# Patient Record
Sex: FEMALE | Race: WHITE | HISPANIC OR LATINO | Employment: OTHER | URBAN - METROPOLITAN AREA
[De-identification: names, ages, dates, MRNs, and addresses within clinical notes are randomized per-mention and may not be internally consistent; named-entity substitution may affect disease eponyms.]

---

## 2023-02-10 PROBLEM — Z86.39: Status: RESOLVED | Noted: 2023-02-10 | Resolved: 2023-02-10

## 2023-02-10 PROBLEM — M79.642 PAIN IN BOTH HANDS: Status: ACTIVE | Noted: 2023-02-10

## 2023-02-10 PROBLEM — N88.2 CERVICAL STRICTURE OR STENOSIS: Status: ACTIVE | Noted: 2023-02-10

## 2023-02-10 PROBLEM — G47.30 SLEEP DISORDER BREATHING: Status: ACTIVE | Noted: 2023-02-10

## 2023-02-10 PROBLEM — K22.4 DOS (DIFFUSE ESOPHAGEAL SPASM): Status: ACTIVE | Noted: 2023-02-10

## 2023-02-10 PROBLEM — E88.810 METABOLIC SYNDROME: Status: ACTIVE | Noted: 2023-02-10

## 2023-02-10 PROBLEM — I10 HTN (HYPERTENSION): Status: ACTIVE | Noted: 2023-02-10

## 2023-02-10 PROBLEM — M79.641 PAIN IN BOTH HANDS: Status: ACTIVE | Noted: 2023-02-10

## 2023-02-10 PROBLEM — R10.12 LEFT UPPER QUADRANT ABDOMINAL PAIN: Status: ACTIVE | Noted: 2023-02-10

## 2023-02-10 PROBLEM — M79.643 HAND PAIN: Status: ACTIVE | Noted: 2023-02-10

## 2023-02-10 PROBLEM — M25.462 EFFUSION OF LEFT KNEE: Status: ACTIVE | Noted: 2023-02-10

## 2023-02-10 PROBLEM — K21.9 GERD (GASTROESOPHAGEAL REFLUX DISEASE): Status: ACTIVE | Noted: 2023-02-10

## 2023-02-10 PROBLEM — R13.10 DYSPHAGIA: Status: ACTIVE | Noted: 2023-02-10

## 2023-02-10 PROBLEM — T75.3XXA MOTION SICKNESS: Status: ACTIVE | Noted: 2023-02-10

## 2023-02-10 PROBLEM — M54.6 PAIN OF THORACIC FACET JOINT: Status: ACTIVE | Noted: 2023-02-10

## 2023-02-10 PROBLEM — R00.2 PALPITATIONS: Status: ACTIVE | Noted: 2023-02-10

## 2023-02-10 PROBLEM — F32.A DEPRESSION: Status: ACTIVE | Noted: 2023-02-10

## 2023-02-10 PROBLEM — G47.33 OSA (OBSTRUCTIVE SLEEP APNEA): Status: ACTIVE | Noted: 2023-02-10

## 2023-02-10 PROBLEM — I49.9 ARRHYTHMIA: Status: ACTIVE | Noted: 2023-02-10

## 2023-02-10 PROBLEM — E66.9 CLASS 2 OBESITY WITH BODY MASS INDEX (BMI) OF 39.0 TO 39.9 IN ADULT: Status: ACTIVE | Noted: 2023-02-10

## 2023-02-10 PROBLEM — E28.319 PREMATURE MENOPAUSE: Status: ACTIVE | Noted: 2023-02-10

## 2023-02-10 PROBLEM — S66.419A: Status: ACTIVE | Noted: 2023-02-10

## 2023-02-10 PROBLEM — M85.80 OSTEOPENIA: Status: ACTIVE | Noted: 2023-02-10

## 2023-02-10 PROBLEM — R53.83 FATIGUE: Status: ACTIVE | Noted: 2023-02-10

## 2023-02-10 PROBLEM — E66.812 CLASS 2 OBESITY WITH BODY MASS INDEX (BMI) OF 39.0 TO 39.9 IN ADULT: Status: ACTIVE | Noted: 2023-02-10

## 2023-02-10 PROBLEM — J32.9 SINUSITIS: Status: ACTIVE | Noted: 2023-02-10

## 2023-02-10 PROBLEM — E78.5 HYPERLIPEMIA: Status: ACTIVE | Noted: 2023-02-10

## 2023-02-10 PROBLEM — M54.12 CERVICAL RADICULOPATHY, CHRONIC: Status: ACTIVE | Noted: 2023-02-10

## 2023-02-10 PROBLEM — M17.0 PRIMARY OSTEOARTHRITIS OF BOTH KNEES: Status: ACTIVE | Noted: 2023-02-10

## 2023-02-10 PROBLEM — M54.9 BACK PAIN: Status: ACTIVE | Noted: 2023-02-10

## 2023-02-10 PROBLEM — L03.032 PARONYCHIA OF TOE OF LEFT FOOT: Status: ACTIVE | Noted: 2023-02-10

## 2023-02-10 PROBLEM — Z86.39: Status: ACTIVE | Noted: 2023-02-10

## 2023-02-10 PROBLEM — R79.82 CRP ELEVATED: Status: ACTIVE | Noted: 2023-02-10

## 2023-02-10 PROBLEM — E55.9 VITAMIN D DEFICIENCY: Status: ACTIVE | Noted: 2023-02-10

## 2023-02-10 PROBLEM — M25.519 SHOULDER PAIN: Status: ACTIVE | Noted: 2023-02-10

## 2023-02-10 PROBLEM — M19.90 OSTEOARTHRITIS: Status: ACTIVE | Noted: 2023-02-10

## 2023-02-10 PROBLEM — R06.83 SNORING: Status: ACTIVE | Noted: 2023-02-10

## 2023-02-10 PROBLEM — E87.6 HYPOKALEMIA: Status: ACTIVE | Noted: 2023-02-10

## 2023-02-10 PROBLEM — M17.11 ARTHRITIS OF KNEE, RIGHT: Status: ACTIVE | Noted: 2023-02-10

## 2023-02-10 PROBLEM — G89.29 CHRONIC PAIN OF TOE OF LEFT FOOT: Status: ACTIVE | Noted: 2023-02-10

## 2023-02-10 PROBLEM — M79.675 CHRONIC PAIN OF TOE OF LEFT FOOT: Status: ACTIVE | Noted: 2023-02-10

## 2023-02-10 PROBLEM — G56.00 CTS (CARPAL TUNNEL SYNDROME): Status: ACTIVE | Noted: 2023-02-10

## 2023-02-10 PROBLEM — M25.50 JOINT PAIN: Status: ACTIVE | Noted: 2023-02-10

## 2023-02-10 PROBLEM — M17.12 ARTHRITIS OF KNEE, LEFT: Status: ACTIVE | Noted: 2023-02-10

## 2023-02-10 PROBLEM — R51.9 HEADACHE: Status: ACTIVE | Noted: 2023-02-10

## 2023-02-10 PROBLEM — M65.4 TENOSYNOVITIS, DE QUERVAIN: Status: ACTIVE | Noted: 2023-02-10

## 2023-02-10 PROBLEM — M79.18 MYOFASCIAL PAIN SYNDROME: Status: ACTIVE | Noted: 2023-02-10

## 2023-02-10 RX ORDER — CHOLECALCIFEROL (VITAMIN D3) 25 MCG
1 TABLET ORAL DAILY
COMMUNITY

## 2023-02-10 RX ORDER — LIDOCAINE HCL 4 G/100G
1 CREAM TOPICAL 3 TIMES DAILY PRN
COMMUNITY
Start: 2020-07-08 | End: 2024-03-26 | Stop reason: HOSPADM

## 2023-02-10 RX ORDER — METFORMIN HYDROCHLORIDE 500 MG/1
1 TABLET ORAL DAILY
COMMUNITY
End: 2023-03-23 | Stop reason: SDUPTHER

## 2023-02-10 RX ORDER — FLUTICASONE PROPIONATE 50 MCG
2 SPRAY, SUSPENSION (ML) NASAL AS NEEDED
COMMUNITY
End: 2024-01-03 | Stop reason: ALTCHOICE

## 2023-02-10 RX ORDER — PANTOPRAZOLE SODIUM 40 MG/1
1 TABLET, DELAYED RELEASE ORAL DAILY
COMMUNITY
End: 2024-01-03 | Stop reason: SDUPTHER

## 2023-02-10 RX ORDER — ATENOLOL 25 MG/1
1 TABLET ORAL DAILY
COMMUNITY
End: 2023-03-23 | Stop reason: SDUPTHER

## 2023-02-10 RX ORDER — AMLODIPINE BESYLATE 10 MG/1
0.5 TABLET ORAL 2 TIMES DAILY
COMMUNITY
End: 2023-03-23 | Stop reason: SDUPTHER

## 2023-02-10 NOTE — ASSESSMENT & PLAN NOTE
[id number]     Mario Alberto Fountain  Moderate to severe with radiculopathy    Will refer to neurosx

## 2023-03-13 RX ORDER — CYCLOBENZAPRINE HCL 10 MG
1 TABLET ORAL NIGHTLY
COMMUNITY
Start: 2023-03-07 | End: 2024-01-03 | Stop reason: ALTCHOICE

## 2023-03-13 RX ORDER — HYDROCODONE BITARTRATE AND ACETAMINOPHEN 5; 325 MG/1; MG/1
1 TABLET ORAL EVERY 8 HOURS
COMMUNITY
Start: 2023-03-07 | End: 2024-01-03 | Stop reason: ALTCHOICE

## 2023-03-20 ENCOUNTER — TELEPHONE (OUTPATIENT)
Dept: PRIMARY CARE | Facility: CLINIC | Age: 68
End: 2023-03-20
Payer: MEDICARE

## 2023-03-21 NOTE — TELEPHONE ENCOUNTER
Per Dr. TRISTAN, I let Pt know she does not need to do any labs prior to her appointment since she just had labs done at the end of January.

## 2023-03-23 ENCOUNTER — OFFICE VISIT (OUTPATIENT)
Dept: PRIMARY CARE | Facility: CLINIC | Age: 68
End: 2023-03-23
Payer: MEDICARE

## 2023-03-23 ENCOUNTER — LAB (OUTPATIENT)
Dept: LAB | Facility: LAB | Age: 68
End: 2023-03-23
Payer: MEDICARE

## 2023-03-23 VITALS
RESPIRATION RATE: 16 BRPM | TEMPERATURE: 97.7 F | HEIGHT: 62 IN | BODY MASS INDEX: 38.64 KG/M2 | DIASTOLIC BLOOD PRESSURE: 62 MMHG | SYSTOLIC BLOOD PRESSURE: 120 MMHG | WEIGHT: 210 LBS | HEART RATE: 72 BPM

## 2023-03-23 DIAGNOSIS — E88.810 METABOLIC SYNDROME: ICD-10-CM

## 2023-03-23 DIAGNOSIS — Z00.00 MEDICARE ANNUAL WELLNESS VISIT, SUBSEQUENT: ICD-10-CM

## 2023-03-23 DIAGNOSIS — E78.49 OTHER HYPERLIPIDEMIA: ICD-10-CM

## 2023-03-23 DIAGNOSIS — E66.09 CLASS 2 OBESITY DUE TO EXCESS CALORIES WITHOUT SERIOUS COMORBIDITY WITH BODY MASS INDEX (BMI) OF 39.0 TO 39.9 IN ADULT: ICD-10-CM

## 2023-03-23 DIAGNOSIS — I10 HYPERTENSION, UNSPECIFIED TYPE: Primary | ICD-10-CM

## 2023-03-23 DIAGNOSIS — Z12.31 ENCOUNTER FOR SCREENING MAMMOGRAM FOR MALIGNANT NEOPLASM OF BREAST: ICD-10-CM

## 2023-03-23 DIAGNOSIS — E55.9 VITAMIN D DEFICIENCY: ICD-10-CM

## 2023-03-23 DIAGNOSIS — I10 HYPERTENSION, UNSPECIFIED TYPE: ICD-10-CM

## 2023-03-23 PROBLEM — Z96.652 STATUS POST TOTAL LEFT KNEE REPLACEMENT: Status: ACTIVE | Noted: 2023-03-23

## 2023-03-23 PROBLEM — M25.562 LEFT KNEE PAIN: Status: ACTIVE | Noted: 2023-03-23

## 2023-03-23 LAB
ALANINE AMINOTRANSFERASE (SGPT) (U/L) IN SER/PLAS: 12 U/L (ref 7–45)
ALBUMIN (G/DL) IN SER/PLAS: 4.4 G/DL (ref 3.4–5)
ALBUMIN (MG/L) IN URINE: 24.5 MG/L
ALBUMIN/CREATININE (UG/MG) IN URINE: 14.3 UG/MG CRT (ref 0–30)
ALKALINE PHOSPHATASE (U/L) IN SER/PLAS: 87 U/L (ref 33–136)
ANION GAP IN SER/PLAS: 12 MMOL/L (ref 10–20)
ASPARTATE AMINOTRANSFERASE (SGOT) (U/L) IN SER/PLAS: 15 U/L (ref 9–39)
BASOPHILS (10*3/UL) IN BLOOD BY AUTOMATED COUNT: 0.09 X10E9/L (ref 0–0.1)
BASOPHILS/100 LEUKOCYTES IN BLOOD BY AUTOMATED COUNT: 1 % (ref 0–2)
BILIRUBIN TOTAL (MG/DL) IN SER/PLAS: 1.1 MG/DL (ref 0–1.2)
CALCIDIOL (25 OH VITAMIN D3) (NG/ML) IN SER/PLAS: 35 NG/ML
CALCIUM (MG/DL) IN SER/PLAS: 9.6 MG/DL (ref 8.6–10.3)
CARBON DIOXIDE, TOTAL (MMOL/L) IN SER/PLAS: 27 MMOL/L (ref 21–32)
CHLORIDE (MMOL/L) IN SER/PLAS: 106 MMOL/L (ref 98–107)
CHOLESTEROL (MG/DL) IN SER/PLAS: 171 MG/DL (ref 0–199)
CHOLESTEROL IN HDL (MG/DL) IN SER/PLAS: 49.5 MG/DL
CHOLESTEROL/HDL RATIO: 3.5
COBALAMIN (VITAMIN B12) (PG/ML) IN SER/PLAS: 441 PG/ML (ref 211–911)
CREATININE (MG/DL) IN SER/PLAS: 0.63 MG/DL (ref 0.5–1.05)
CREATININE (MG/DL) IN URINE: 171 MG/DL (ref 20–320)
EOSINOPHILS (10*3/UL) IN BLOOD BY AUTOMATED COUNT: 0.07 X10E9/L (ref 0–0.7)
EOSINOPHILS/100 LEUKOCYTES IN BLOOD BY AUTOMATED COUNT: 0.8 % (ref 0–6)
ERYTHROCYTE DISTRIBUTION WIDTH (RATIO) BY AUTOMATED COUNT: 13.4 % (ref 11.5–14.5)
ERYTHROCYTE MEAN CORPUSCULAR HEMOGLOBIN CONCENTRATION (G/DL) BY AUTOMATED: 31.4 G/DL (ref 32–36)
ERYTHROCYTE MEAN CORPUSCULAR VOLUME (FL) BY AUTOMATED COUNT: 92 FL (ref 80–100)
ERYTHROCYTES (10*6/UL) IN BLOOD BY AUTOMATED COUNT: 4.59 X10E12/L (ref 4–5.2)
GFR FEMALE: >90 ML/MIN/1.73M2
GLUCOSE (MG/DL) IN SER/PLAS: 96 MG/DL (ref 74–99)
HEMATOCRIT (%) IN BLOOD BY AUTOMATED COUNT: 42.3 % (ref 36–46)
HEMOGLOBIN (G/DL) IN BLOOD: 13.3 G/DL (ref 12–16)
HEPATITIS C VIRUS AB PRESENCE IN SERUM: NONREACTIVE
IMMATURE GRANULOCYTES/100 LEUKOCYTES IN BLOOD BY AUTOMATED COUNT: 0.2 % (ref 0–0.9)
LDL: 100 MG/DL (ref 0–99)
LEUKOCYTES (10*3/UL) IN BLOOD BY AUTOMATED COUNT: 9.1 X10E9/L (ref 4.4–11.3)
LYMPHOCYTES (10*3/UL) IN BLOOD BY AUTOMATED COUNT: 2.19 X10E9/L (ref 1.2–4.8)
LYMPHOCYTES/100 LEUKOCYTES IN BLOOD BY AUTOMATED COUNT: 24 % (ref 13–44)
MAGNESIUM (MG/DL) IN SER/PLAS: 2.01 MG/DL (ref 1.6–2.4)
MONOCYTES (10*3/UL) IN BLOOD BY AUTOMATED COUNT: 0.64 X10E9/L (ref 0.1–1)
MONOCYTES/100 LEUKOCYTES IN BLOOD BY AUTOMATED COUNT: 7 % (ref 2–10)
NEUTROPHILS (10*3/UL) IN BLOOD BY AUTOMATED COUNT: 6.11 X10E9/L (ref 1.2–7.7)
NEUTROPHILS/100 LEUKOCYTES IN BLOOD BY AUTOMATED COUNT: 67 % (ref 40–80)
PLATELETS (10*3/UL) IN BLOOD AUTOMATED COUNT: 298 X10E9/L (ref 150–450)
POTASSIUM (MMOL/L) IN SER/PLAS: 4.3 MMOL/L (ref 3.5–5.3)
PROTEIN TOTAL: 7 G/DL (ref 6.4–8.2)
SODIUM (MMOL/L) IN SER/PLAS: 141 MMOL/L (ref 136–145)
THYROTROPIN (MIU/L) IN SER/PLAS BY DETECTION LIMIT <= 0.05 MIU/L: 1.28 MIU/L (ref 0.44–3.98)
TRIGLYCERIDE (MG/DL) IN SER/PLAS: 110 MG/DL (ref 0–149)
UREA NITROGEN (MG/DL) IN SER/PLAS: 16 MG/DL (ref 6–23)
VLDL: 22 MG/DL (ref 0–40)

## 2023-03-23 PROCEDURE — 85025 COMPLETE CBC W/AUTO DIFF WBC: CPT

## 2023-03-23 PROCEDURE — 82607 VITAMIN B-12: CPT

## 2023-03-23 PROCEDURE — 80053 COMPREHEN METABOLIC PANEL: CPT

## 2023-03-23 PROCEDURE — 82306 VITAMIN D 25 HYDROXY: CPT

## 2023-03-23 PROCEDURE — 82043 UR ALBUMIN QUANTITATIVE: CPT

## 2023-03-23 PROCEDURE — 99214 OFFICE O/P EST MOD 30 MIN: CPT | Performed by: INTERNAL MEDICINE

## 2023-03-23 PROCEDURE — G0439 PPPS, SUBSEQ VISIT: HCPCS | Performed by: INTERNAL MEDICINE

## 2023-03-23 PROCEDURE — 36415 COLL VENOUS BLD VENIPUNCTURE: CPT

## 2023-03-23 PROCEDURE — 3074F SYST BP LT 130 MM HG: CPT | Performed by: INTERNAL MEDICINE

## 2023-03-23 PROCEDURE — 80061 LIPID PANEL: CPT

## 2023-03-23 PROCEDURE — 3078F DIAST BP <80 MM HG: CPT | Performed by: INTERNAL MEDICINE

## 2023-03-23 PROCEDURE — 1159F MED LIST DOCD IN RCRD: CPT | Performed by: INTERNAL MEDICINE

## 2023-03-23 PROCEDURE — 83735 ASSAY OF MAGNESIUM: CPT

## 2023-03-23 PROCEDURE — 84443 ASSAY THYROID STIM HORMONE: CPT

## 2023-03-23 PROCEDURE — 82570 ASSAY OF URINE CREATININE: CPT

## 2023-03-23 PROCEDURE — 86803 HEPATITIS C AB TEST: CPT

## 2023-03-23 PROCEDURE — 1157F ADVNC CARE PLAN IN RCRD: CPT | Performed by: INTERNAL MEDICINE

## 2023-03-23 PROCEDURE — 3008F BODY MASS INDEX DOCD: CPT | Performed by: INTERNAL MEDICINE

## 2023-03-23 PROCEDURE — 1170F FXNL STATUS ASSESSED: CPT | Performed by: INTERNAL MEDICINE

## 2023-03-23 PROCEDURE — 1160F RVW MEDS BY RX/DR IN RCRD: CPT | Performed by: INTERNAL MEDICINE

## 2023-03-23 RX ORDER — ATENOLOL 25 MG/1
25 TABLET ORAL DAILY
Qty: 90 TABLET | Refills: 0 | Status: SHIPPED | OUTPATIENT
Start: 2023-03-23 | End: 2023-07-07 | Stop reason: SDUPTHER

## 2023-03-23 RX ORDER — METFORMIN HYDROCHLORIDE 500 MG/1
500 TABLET ORAL DAILY
Qty: 90 TABLET | Refills: 0 | Status: SHIPPED | OUTPATIENT
Start: 2023-03-23 | End: 2023-07-07 | Stop reason: SDUPTHER

## 2023-03-23 RX ORDER — AMLODIPINE BESYLATE 10 MG/1
5 TABLET ORAL 2 TIMES DAILY
Qty: 90 TABLET | Refills: 1 | Status: SHIPPED | OUTPATIENT
Start: 2023-03-23 | End: 2023-07-07 | Stop reason: SDUPTHER

## 2023-03-23 RX ORDER — AMLODIPINE BESYLATE 10 MG/1
5 TABLET ORAL 2 TIMES DAILY
Qty: 90 TABLET | Refills: 1 | Status: SHIPPED | OUTPATIENT
Start: 2023-03-23 | End: 2023-03-23 | Stop reason: SDUPTHER

## 2023-03-23 ASSESSMENT — ENCOUNTER SYMPTOMS
NAUSEA: 0
PALPITATIONS: 0
JOINT SWELLING: 0
BACK PAIN: 1
SHORTNESS OF BREATH: 0
WEAKNESS: 0
CONSTIPATION: 0
VOMITING: 0
SLEEP DISTURBANCE: 0
DYSURIA: 0
COUGH: 0
WHEEZING: 0
CONFUSION: 0
DIZZINESS: 0
SORE THROAT: 0
FATIGUE: 0
DIARRHEA: 0
ABDOMINAL PAIN: 0
CHILLS: 0
UNEXPECTED WEIGHT CHANGE: 0
ADENOPATHY: 0
NECK PAIN: 1
ARTHRALGIAS: 1
CHEST TIGHTNESS: 0

## 2023-03-23 ASSESSMENT — PATIENT HEALTH QUESTIONNAIRE - PHQ9
2. FEELING DOWN, DEPRESSED OR HOPELESS: NOT AT ALL
SUM OF ALL RESPONSES TO PHQ9 QUESTIONS 1 AND 2: 0
1. LITTLE INTEREST OR PLEASURE IN DOING THINGS: NOT AT ALL

## 2023-03-23 ASSESSMENT — ACTIVITIES OF DAILY LIVING (ADL)
DOING_HOUSEWORK: INDEPENDENT
GROCERY_SHOPPING: INDEPENDENT
MANAGING_FINANCES: INDEPENDENT
TAKING_MEDICATION: INDEPENDENT
DRESSING: INDEPENDENT
BATHING: INDEPENDENT

## 2023-03-23 NOTE — PROGRESS NOTES
"Subjective   Reason for Visit: Billie Walsh is an 67 y.o. female here for a Medicare Wellness visit.     Past Medical, Surgical, and Family History reviewed and updated in chart.    Reviewed all medications by prescribing practitioner or clinical pharmacist (such as prescriptions, OTCs, herbal therapies and supplements) and documented in the medical record.    AWV  Former smoker , quit 1985, less than 1ppd   Patient had flu and covid vaccines done  Colonoscopy-4.2014-Q10y  Dexa-5.2022  PAP-2014    Had hysterectomy in 2013    Patient Care Team:  Mario Alberto Fountain MD as PCP - General  Mario Alberto Fountain MD as PCP - MSSP ACO Attributed Provider     Review of Systems   Constitutional:  Negative for chills, fatigue and unexpected weight change.        Comment   HENT:  Negative for congestion, ear pain and sore throat.    Respiratory:  Negative for cough, chest tightness, shortness of breath and wheezing.    Cardiovascular:  Negative for palpitations and leg swelling.   Gastrointestinal:  Negative for abdominal pain, constipation, diarrhea, nausea and vomiting.   Genitourinary:  Negative for dysuria and urgency.   Musculoskeletal:  Positive for arthralgias, back pain and neck pain. Negative for joint swelling.   Skin:  Negative for rash.   Neurological:  Negative for dizziness and weakness.   Hematological:  Negative for adenopathy.   Psychiatric/Behavioral:  Negative for confusion and sleep disturbance.    All other systems reviewed and are negative.      Objective   Vitals:  /62 (BP Location: Left arm, Patient Position: Sitting)   Pulse 72   Temp 36.5 °C (97.7 °F)   Resp 16   Ht 1.575 m (5' 2\")   Wt 95.3 kg (210 lb)   BMI 38.41 kg/m²       Physical Exam  Constitutional:       Appearance: Normal appearance.      Comments: obese   HENT:      Head: Normocephalic and atraumatic.   Eyes:      Conjunctiva/sclera: Conjunctivae normal.   Neck:      Vascular: No carotid bruit.   Cardiovascular:      Rate and " Rhythm: Normal rate and regular rhythm.      Heart sounds: No murmur heard.  Pulmonary:      Effort: No respiratory distress.      Breath sounds: No wheezing, rhonchi or rales.   Chest:      Chest wall: No tenderness.   Abdominal:      General: Bowel sounds are normal. There is no distension.      Palpations: Abdomen is soft. There is no mass.      Tenderness: There is no abdominal tenderness.   Musculoskeletal:         General: No swelling.      Cervical back: Neck supple.      Right lower leg: No edema.      Left lower leg: No edema.   Lymphadenopathy:      Cervical: No cervical adenopathy.   Skin:     Coloration: Skin is not jaundiced.      Findings: No rash.   Neurological:      General: No focal deficit present.      Mental Status: She is alert and oriented to person, place, and time. Mental status is at baseline.      Motor: No weakness.      Gait: Gait normal.   Psychiatric:         Mood and Affect: Mood normal.         Behavior: Behavior normal.         Judgment: Judgment normal.         Assessment/Plan   Problem List Items Addressed This Visit       Hyperlipemia    Overview     [id number]   Mario Alberto Fountain  I have discussed the cardiovascular riskk and behavioral modification, nutrition, exercise, and elimination of habits contributing to risk. We agreed to a plan how to reduce risk.    CV risk estimate calculates 7.6%    Wants to wt on tx, will work on diet and wt loss         Relevant Orders    Lipid Panel    HTN (hypertension) - Primary    Overview     [id number]   Mario Alberto Fountain  Controlled now    Continue your current medical regimen with no change         Relevant Medications    atenolol (Tenormin) 25 mg tablet    amLODIPine (Norvasc) 10 mg tablet    Other Relevant Orders    Magnesium    Vitamin B12    Lipid Panel    TSH with reflex to Free T4 if abnormal    Comprehensive Metabolic Panel    CBC and Auto Differential    Metabolic syndrome    Overview     [id number]          Mario Alberto Fountain  No DM, wt  loss, diet         Relevant Medications    metFORMIN (Glucophage) 500 mg tablet    Other Relevant Orders    Albumin , Urine Random    Lipid Panel    Comprehensive Metabolic Panel    CBC and Auto Differential    Vitamin D deficiency    Relevant Orders    Vitamin D, Total    Class 2 obesity with body mass index (BMI) of 39.0 to 39.9 in adult     Other Visit Diagnoses       Encounter for screening mammogram for malignant neoplasm of breast        Relevant Orders    BI mammo bilateral screening tomosynthesis    Medicare annual wellness visit, subsequent        Relevant Orders    Hepatitis C antibody

## 2023-07-05 ENCOUNTER — APPOINTMENT (OUTPATIENT)
Dept: PRIMARY CARE | Facility: CLINIC | Age: 68
End: 2023-07-05
Payer: MEDICARE

## 2023-07-07 ENCOUNTER — TELEPHONE (OUTPATIENT)
Dept: PRIMARY CARE | Facility: CLINIC | Age: 68
End: 2023-07-07
Payer: MEDICARE

## 2023-07-07 DIAGNOSIS — E88.810 METABOLIC SYNDROME: ICD-10-CM

## 2023-07-07 DIAGNOSIS — I10 HYPERTENSION, UNSPECIFIED TYPE: ICD-10-CM

## 2023-07-07 RX ORDER — METFORMIN HYDROCHLORIDE 500 MG/1
500 TABLET ORAL DAILY
Qty: 90 TABLET | Refills: 3 | Status: SHIPPED | OUTPATIENT
Start: 2023-07-07 | End: 2024-03-21 | Stop reason: DRUGHIGH

## 2023-07-07 RX ORDER — ATENOLOL 25 MG/1
25 TABLET ORAL DAILY
Qty: 90 TABLET | Refills: 3 | Status: SHIPPED | OUTPATIENT
Start: 2023-07-07 | End: 2024-03-21 | Stop reason: DRUGHIGH

## 2023-07-07 RX ORDER — AMLODIPINE BESYLATE 10 MG/1
5 TABLET ORAL 2 TIMES DAILY
Qty: 90 TABLET | Refills: 3 | Status: SHIPPED | OUTPATIENT
Start: 2023-07-07 | End: 2024-03-21 | Stop reason: DRUGHIGH

## 2023-07-07 NOTE — TELEPHONE ENCOUNTER
metFORMIN (Glucophage) 500 mg tablet   atenolol (Tenormin) 25 mg tablet   amLODIPine (Norvasc) 10 mg tablet     All to [facility] mail order pharmacy

## 2023-07-17 ENCOUNTER — APPOINTMENT (OUTPATIENT)
Dept: PRIMARY CARE | Facility: CLINIC | Age: 68
End: 2023-07-17
Payer: MEDICARE

## 2023-07-24 ENCOUNTER — OFFICE VISIT (OUTPATIENT)
Dept: PRIMARY CARE | Facility: CLINIC | Age: 68
End: 2023-07-24
Payer: MEDICARE

## 2023-07-24 VITALS
DIASTOLIC BLOOD PRESSURE: 60 MMHG | SYSTOLIC BLOOD PRESSURE: 120 MMHG | HEART RATE: 72 BPM | BODY MASS INDEX: 39.56 KG/M2 | RESPIRATION RATE: 16 BRPM | TEMPERATURE: 97.3 F | HEIGHT: 62 IN | WEIGHT: 215 LBS

## 2023-07-24 DIAGNOSIS — R10.9 ACUTE RIGHT FLANK PAIN: ICD-10-CM

## 2023-07-24 DIAGNOSIS — I10 PRIMARY HYPERTENSION: ICD-10-CM

## 2023-07-24 DIAGNOSIS — E88.810 METABOLIC SYNDROME: Primary | ICD-10-CM

## 2023-07-24 DIAGNOSIS — M54.50 ACUTE BILATERAL LOW BACK PAIN WITHOUT SCIATICA: ICD-10-CM

## 2023-07-24 PROBLEM — M75.82 TENDINITIS OF LEFT ROTATOR CUFF: Status: ACTIVE | Noted: 2023-07-24

## 2023-07-24 LAB — POC HEMOGLOBIN A1C: 5.7 % (ref 4.2–6.5)

## 2023-07-24 PROCEDURE — 3078F DIAST BP <80 MM HG: CPT | Performed by: INTERNAL MEDICINE

## 2023-07-24 PROCEDURE — 1159F MED LIST DOCD IN RCRD: CPT | Performed by: INTERNAL MEDICINE

## 2023-07-24 PROCEDURE — 3074F SYST BP LT 130 MM HG: CPT | Performed by: INTERNAL MEDICINE

## 2023-07-24 PROCEDURE — 3008F BODY MASS INDEX DOCD: CPT | Performed by: INTERNAL MEDICINE

## 2023-07-24 PROCEDURE — 1160F RVW MEDS BY RX/DR IN RCRD: CPT | Performed by: INTERNAL MEDICINE

## 2023-07-24 PROCEDURE — 99214 OFFICE O/P EST MOD 30 MIN: CPT | Performed by: INTERNAL MEDICINE

## 2023-07-24 PROCEDURE — 83036 HEMOGLOBIN GLYCOSYLATED A1C: CPT | Performed by: INTERNAL MEDICINE

## 2023-07-24 PROCEDURE — 1157F ADVNC CARE PLAN IN RCRD: CPT | Performed by: INTERNAL MEDICINE

## 2023-07-24 PROCEDURE — 1125F AMNT PAIN NOTED PAIN PRSNT: CPT | Performed by: INTERNAL MEDICINE

## 2023-07-24 RX ORDER — LIDOCAINE 40 MG/G
CREAM TOPICAL
COMMUNITY
Start: 2020-07-08 | End: 2024-01-03 | Stop reason: SDUPTHER

## 2023-07-24 ASSESSMENT — ENCOUNTER SYMPTOMS
DIZZINESS: 0
HEADACHES: 0
RESPIRATORY NEGATIVE: 1
FATIGUE: 0
CARDIOVASCULAR NEGATIVE: 1

## 2023-07-24 NOTE — PROGRESS NOTES
"Subjective   Billie Walsh is a 67 y.o. female who presents for Follow-up (3 months follow up metabolic syndrome ,HTN/Labs- 3.2023).  Follow up metabolic SD,last HGBA1C was 5.5 on 1.30.2023,today is 5.7  Follow up HTN- checking BP at home sometimes  Labs - 3.2023  Back pain and L side area middle section  x 1 month getting worse, went to urgent care 2 weeks ago gave her steroids x 1 week and muscle relaxer, after finishing TX, pain came back , no radiation on the leg but on left flank, has an curry with ortho, dr Mcgee      Review of Systems   Constitutional:  Negative for fatigue.   Respiratory: Negative.     Cardiovascular: Negative.    Neurological:  Negative for dizziness and headaches.       Objective   Physical Exam  Constitutional:       Comments: Right para lumbar muscle spasm and pain, no rash       /60 (BP Location: Right arm, Patient Position: Sitting)   Pulse 72   Temp 36.3 °C (97.3 °F)   Resp 16   Ht 1.575 m (5' 2\")   Wt 97.5 kg (215 lb)   BMI 39.32 kg/m²       Assessment/Plan   Problem List Items Addressed This Visit       Back pain    HTN (hypertension)     Controlled, no side effects         Metabolic syndrome - Primary     Hba1c 5.7         Relevant Orders    POCT glycosylated hemoglobin (Hb A1C) manually resulted    Acute right flank pain    Relevant Orders    US renal complete       "

## 2023-09-27 ENCOUNTER — APPOINTMENT (OUTPATIENT)
Dept: PRIMARY CARE | Facility: CLINIC | Age: 68
End: 2023-09-27
Payer: MEDICARE

## 2023-12-21 ENCOUNTER — DOCUMENTATION (OUTPATIENT)
Dept: PHYSICAL THERAPY | Facility: CLINIC | Age: 68
End: 2023-12-21
Payer: MEDICARE

## 2023-12-21 NOTE — PROGRESS NOTES
Physical Therapy    Discharge Summary    Name: Billie Walsh  MRN: [id number]  : 1955  Date: 23    Discharge Summary: PT    Discharge Information: Date of discharge 23, Date of last visit 23 , Date of evaluation 8/10/23, Number of attended visits 4, and Referred for back pain    Therapy Summary: seeing some changes with back pain but only came to a few visits    Discharge Status: unknown     Rehab Discharge Reason: Failed to schedule and/or keep follow-up appointment(s)

## 2024-01-03 ENCOUNTER — OFFICE VISIT (OUTPATIENT)
Dept: PRIMARY CARE | Facility: CLINIC | Age: 69
End: 2024-01-03
Payer: MEDICARE

## 2024-01-03 VITALS
OXYGEN SATURATION: 96 % | SYSTOLIC BLOOD PRESSURE: 120 MMHG | TEMPERATURE: 97.7 F | HEART RATE: 82 BPM | BODY MASS INDEX: 39.9 KG/M2 | WEIGHT: 216.8 LBS | HEIGHT: 62 IN | RESPIRATION RATE: 16 BRPM | DIASTOLIC BLOOD PRESSURE: 68 MMHG

## 2024-01-03 DIAGNOSIS — M54.40 CHRONIC MIDLINE LOW BACK PAIN WITH SCIATICA, SCIATICA LATERALITY UNSPECIFIED: ICD-10-CM

## 2024-01-03 DIAGNOSIS — E88.810 METABOLIC SYNDROME: Primary | ICD-10-CM

## 2024-01-03 DIAGNOSIS — E78.49 OTHER HYPERLIPIDEMIA: ICD-10-CM

## 2024-01-03 DIAGNOSIS — R63.5 ABNORMAL WEIGHT GAIN: ICD-10-CM

## 2024-01-03 DIAGNOSIS — E66.09 CLASS 2 OBESITY DUE TO EXCESS CALORIES WITHOUT SERIOUS COMORBIDITY WITH BODY MASS INDEX (BMI) OF 39.0 TO 39.9 IN ADULT: ICD-10-CM

## 2024-01-03 DIAGNOSIS — N39.3 STRESS INCONTINENCE OF URINE: ICD-10-CM

## 2024-01-03 DIAGNOSIS — E55.9 VITAMIN D DEFICIENCY: ICD-10-CM

## 2024-01-03 DIAGNOSIS — Z00.00 HEALTH CARE MAINTENANCE: ICD-10-CM

## 2024-01-03 DIAGNOSIS — E66.01 MORBID (SEVERE) OBESITY DUE TO EXCESS CALORIES (MULTI): ICD-10-CM

## 2024-01-03 DIAGNOSIS — G89.29 CHRONIC MIDLINE LOW BACK PAIN WITH SCIATICA, SCIATICA LATERALITY UNSPECIFIED: ICD-10-CM

## 2024-01-03 DIAGNOSIS — I10 PRIMARY HYPERTENSION: ICD-10-CM

## 2024-01-03 DIAGNOSIS — E74.819 DISORDERS OF GLUCOSE TRANSPORT, UNSPECIFIED (MULTI): ICD-10-CM

## 2024-01-03 PROBLEM — E11.65 TYPE 2 DIABETES MELLITUS WITH HYPERGLYCEMIA, WITHOUT LONG-TERM CURRENT USE OF INSULIN (MULTI): Status: ACTIVE | Noted: 2024-01-03

## 2024-01-03 PROBLEM — R29.3 POOR POSTURE: Status: ACTIVE | Noted: 2024-01-03

## 2024-01-03 PROBLEM — E11.65 TYPE 2 DIABETES MELLITUS WITH HYPERGLYCEMIA, WITHOUT LONG-TERM CURRENT USE OF INSULIN (MULTI): Status: RESOLVED | Noted: 2024-01-03 | Resolved: 2024-01-03

## 2024-01-03 PROCEDURE — 99214 OFFICE O/P EST MOD 30 MIN: CPT | Performed by: INTERNAL MEDICINE

## 2024-01-03 PROCEDURE — 1125F AMNT PAIN NOTED PAIN PRSNT: CPT | Performed by: INTERNAL MEDICINE

## 2024-01-03 PROCEDURE — 3074F SYST BP LT 130 MM HG: CPT | Performed by: INTERNAL MEDICINE

## 2024-01-03 PROCEDURE — 1159F MED LIST DOCD IN RCRD: CPT | Performed by: INTERNAL MEDICINE

## 2024-01-03 PROCEDURE — 3008F BODY MASS INDEX DOCD: CPT | Performed by: INTERNAL MEDICINE

## 2024-01-03 PROCEDURE — 3078F DIAST BP <80 MM HG: CPT | Performed by: INTERNAL MEDICINE

## 2024-01-03 PROCEDURE — 1160F RVW MEDS BY RX/DR IN RCRD: CPT | Performed by: INTERNAL MEDICINE

## 2024-01-03 RX ORDER — PANTOPRAZOLE SODIUM 40 MG/1
40 TABLET, DELAYED RELEASE ORAL DAILY
Qty: 90 EACH | Refills: 2 | Status: SHIPPED | OUTPATIENT
Start: 2024-01-03

## 2024-01-03 RX ORDER — SEMAGLUTIDE 1.34 MG/ML
0.25 INJECTION, SOLUTION SUBCUTANEOUS
Qty: 1 EACH | Refills: 3 | Status: SHIPPED | OUTPATIENT
Start: 2024-01-03 | End: 2024-03-15 | Stop reason: ALTCHOICE

## 2024-01-03 ASSESSMENT — ENCOUNTER SYMPTOMS
CHILLS: 0
CHEST TIGHTNESS: 0
VOMITING: 0
WHEEZING: 0
CONFUSION: 0
NAUSEA: 0
SHORTNESS OF BREATH: 0
COUGH: 0
DYSURIA: 0
SORE THROAT: 0
ABDOMINAL PAIN: 0
DIARRHEA: 0
SLEEP DISTURBANCE: 0
ARTHRALGIAS: 0
JOINT SWELLING: 0
UNEXPECTED WEIGHT CHANGE: 0
ADENOPATHY: 0
CONSTIPATION: 0
PALPITATIONS: 0
FATIGUE: 0
WEAKNESS: 0
DIZZINESS: 0

## 2024-01-03 ASSESSMENT — PATIENT HEALTH QUESTIONNAIRE - PHQ9
2. FEELING DOWN, DEPRESSED OR HOPELESS: NOT AT ALL
1. LITTLE INTEREST OR PLEASURE IN DOING THINGS: NOT AT ALL
SUM OF ALL RESPONSES TO PHQ9 QUESTIONS 1 AND 2: 0

## 2024-01-03 NOTE — PATIENT INSTRUCTIONS
Was nice seeing you today.  Continue same medication.  Have lab work done before next appointment if labs were ordered today.  Fu in 3 month. awv  Call/ contact our office with any concerns.

## 2024-01-03 NOTE — PROGRESS NOTES
Subjective   Billie Walsh is a 68 y.o. female who presents for Follow-up (Follow up HTN).  Follow up HTN-checking BP at home sometimes  Follow up metabolic syndrome   US renal-7.2023  Mammogram- 7.28.2023  \has urinary incontinence , getting worse.         Review of Systems   Constitutional:  Negative for chills, fatigue and unexpected weight change.        Comment   HENT:  Negative for congestion, ear pain and sore throat.    Respiratory:  Negative for cough, chest tightness, shortness of breath and wheezing.    Cardiovascular:  Negative for palpitations and leg swelling.   Gastrointestinal:  Negative for abdominal pain, constipation, diarrhea, nausea and vomiting.   Genitourinary:  Negative for dysuria and urgency.   Musculoskeletal:  Negative for arthralgias and joint swelling.   Skin:  Negative for rash.   Neurological:  Negative for dizziness and weakness.   Hematological:  Negative for adenopathy.   Psychiatric/Behavioral:  Negative for confusion and sleep disturbance.    === 07/24/23 ===    US RENAL COMPLETE    - Impression -  Unremarkable bilateral renal ultrasound.  === 08/08/23 ===    MR LUMBAR SPINE WO CONTRAST    - Impression -  Mild discogenic degenerative changes in the lower lumbar spine with  facet arthrosis. There is degenerative anterior subluxation of L4 and  L5.    Superimposed multilevel mild bulging disc without significant central  canal or neural foraminal stenosis.    No acute osseous abnormality.  Seen by ortho, has medical tx for now for LBP    Objective   Physical Exam  Constitutional:       Appearance: Normal appearance.   HENT:      Head: Normocephalic and atraumatic.   Eyes:      Pupils: Pupils are equal, round, and reactive to light.   Cardiovascular:      Rate and Rhythm: Normal rate and regular rhythm.   Pulmonary:      Effort: Pulmonary effort is normal.      Breath sounds: Normal breath sounds.   Musculoskeletal:         General: Normal range of motion.      Cervical back:  "Normal range of motion and neck supple.   Skin:     General: Skin is warm.   Neurological:      General: No focal deficit present.      Mental Status: She is alert and oriented to person, place, and time.   Psychiatric:         Mood and Affect: Mood normal.         Behavior: Behavior normal.       /68 (BP Location: Left arm, Patient Position: Sitting)   Pulse 82   Temp 36.5 °C (97.7 °F)   Resp 16   Ht 1.575 m (5' 2\")   Wt 98.3 kg (216 lb 12.8 oz)   SpO2 96% Comment: RA  BMI 39.65 kg/m²       Assessment/Plan   Problem List Items Addressed This Visit       Back pain     Had mri done, seen by bri         Hyperlipemia    HTN (hypertension)    Metabolic syndrome - Primary    Relevant Medications    semaglutide (Ozempic) 0.25 mg or 0.5 mg(2 mg/1.5 mL) pen injector    Other Relevant Orders    Lipid Panel    Hemoglobin A1C    Vitamin D deficiency    Morbid (severe) obesity due to excess calories (CMS/[facility])     Low calorie diet , will try ozempic if covered by insurance         Relevant Medications    semaglutide (Ozempic) 0.25 mg or 0.5 mg(2 mg/1.5 mL) pen injector    Stress incontinence of urine    Relevant Orders    Referral to Urogynecology     Other Visit Diagnoses       Health care maintenance        Relevant Medications    pantoprazole (ProtoNix) 40 mg EC tablet    Other Relevant Orders    Vitamin D 25-Hydroxy,Total (for eval of Vitamin D levels)    Abnormal weight gain        Relevant Orders    TSH with reflex to Free T4 if abnormal    Disorders of glucose transport, unspecified (CMS/[facility])        Relevant Orders    Hemoglobin A1C            "

## 2024-01-05 DIAGNOSIS — E88.810 METABOLIC SYNDROME: ICD-10-CM

## 2024-01-05 DIAGNOSIS — E66.01 MORBID (SEVERE) OBESITY DUE TO EXCESS CALORIES (MULTI): ICD-10-CM

## 2024-01-05 DIAGNOSIS — E66.09 CLASS 2 OBESITY DUE TO EXCESS CALORIES WITHOUT SERIOUS COMORBIDITY WITH BODY MASS INDEX (BMI) OF 39.0 TO 39.9 IN ADULT: ICD-10-CM

## 2024-02-05 ENCOUNTER — OFFICE VISIT (OUTPATIENT)
Dept: SLEEP MEDICINE | Facility: CLINIC | Age: 69
End: 2024-02-05
Payer: MEDICARE

## 2024-02-05 VITALS
HEIGHT: 62 IN | HEART RATE: 92 BPM | RESPIRATION RATE: 18 BRPM | BODY MASS INDEX: 40.58 KG/M2 | WEIGHT: 220.5 LBS | SYSTOLIC BLOOD PRESSURE: 121 MMHG | OXYGEN SATURATION: 95 % | DIASTOLIC BLOOD PRESSURE: 74 MMHG

## 2024-02-05 DIAGNOSIS — G47.33 OSA (OBSTRUCTIVE SLEEP APNEA): Primary | ICD-10-CM

## 2024-02-05 PROCEDURE — 1157F ADVNC CARE PLAN IN RCRD: CPT | Performed by: GENERAL PRACTICE

## 2024-02-05 PROCEDURE — 1159F MED LIST DOCD IN RCRD: CPT | Performed by: GENERAL PRACTICE

## 2024-02-05 PROCEDURE — 1160F RVW MEDS BY RX/DR IN RCRD: CPT | Performed by: GENERAL PRACTICE

## 2024-02-05 PROCEDURE — 3008F BODY MASS INDEX DOCD: CPT | Performed by: GENERAL PRACTICE

## 2024-02-05 PROCEDURE — 3078F DIAST BP <80 MM HG: CPT | Performed by: GENERAL PRACTICE

## 2024-02-05 PROCEDURE — 1125F AMNT PAIN NOTED PAIN PRSNT: CPT | Performed by: GENERAL PRACTICE

## 2024-02-05 PROCEDURE — 99213 OFFICE O/P EST LOW 20 MIN: CPT | Performed by: GENERAL PRACTICE

## 2024-02-05 PROCEDURE — 3074F SYST BP LT 130 MM HG: CPT | Performed by: GENERAL PRACTICE

## 2024-02-05 ASSESSMENT — COLUMBIA-SUICIDE SEVERITY RATING SCALE - C-SSRS
2. HAVE YOU ACTUALLY HAD ANY THOUGHTS OF KILLING YOURSELF?: NO
1. IN THE PAST MONTH, HAVE YOU WISHED YOU WERE DEAD OR WISHED YOU COULD GO TO SLEEP AND NOT WAKE UP?: NO
6. HAVE YOU EVER DONE ANYTHING, STARTED TO DO ANYTHING, OR PREPARED TO DO ANYTHING TO END YOUR LIFE?: NO

## 2024-02-05 ASSESSMENT — PATIENT HEALTH QUESTIONNAIRE - PHQ9
1. LITTLE INTEREST OR PLEASURE IN DOING THINGS: NOT AT ALL
2. FEELING DOWN, DEPRESSED OR HOPELESS: NOT AT ALL
SUM OF ALL RESPONSES TO PHQ9 QUESTIONS 1 AND 2: 0

## 2024-02-05 ASSESSMENT — ENCOUNTER SYMPTOMS
DEPRESSION: 0
LOSS OF SENSATION IN FEET: 0
OCCASIONAL FEELINGS OF UNSTEADINESS: 1

## 2024-02-05 NOTE — PROGRESS NOTES
Patient: Billie Walsh    [id number]  : 1955 -- AGE 68 y.o.    Provider: Roberto Jarquin DO     [facility]   Service Date: 2024            Follow up visit note     HISTORY OF PRESENT ILLNESS   HISTORY OF PRESENT ILLNESS   Billie Walsh is a 68 y.o. female who presents to a [facility] for a sleep medicine evaluation with concerns of Follow-up (Yearly follow up no issues with cpap machine or supplies ).     The patient  has no past medical history on file..    PAST SLEEP HISTORY    pmhx including arrhythmia, arthritis, back pain, DM?,metabolic syndrome, GERD, HTN.     patient has been snoring for yrs but has not done anything for it in the past. Around  patient started experiencing fatigue, un-refreshing sleep and sleepiness; she feels that symptoms have worsened recently.     CURRENT HISTORY    On today's visit, 2024, the patient reports that she tries to use pap therapy regularly. She is comfortable with mask and settings.     PAP Related Problems: no leak perceived, no skin irritation from mask and no snoring with PAP.   Perceived Benefits of PAP Therapy: refreshing sleep, decreased nocturia, decreased dry mouth or sore throat, decreased daytime sleepiness, decreased nocturnal awakenings and decreased snoring/ choking/ gasping.     Sleep schedule  on weekdays / work days:  Usual Bedtime: 11:45pm  Sleep latency: 15min   Wake time : 7:30-8am   Total sleep time average/day: 7-8 hours/day  Awakenings: seldom    Naps: none     Sleep schedule  on weekends/non work days :  Same as above   Sleep aid: none     Occupation: retired, used to be a .     Preferred sleeping position: SLEEP POSITION: sidelying    Sleep-related ROS:    Snoring:  n  Witnessed apneas:  n        Am Dry mouth: sometimes.             Nasal congestion: n        am headaches: n    Sleep is described as refreshing .     Daytime sleepiness: sometimes    Fatigue or decreased energy: sometimes   Difficulty remembering things in daytime: n  Difficulty staying focused in daytime: n  Irritable during the day: n  Drowsy driving: n  Hx of car accident: n  Near-miss Car accident: n      RLS screen:  RLSSCREEN: - Sensations: Patient does not have unusual sensations in their extremities that cause an urge to move them   DENIES  Sleep-related behaviors: DENIES    Daytime Symptoms    ESS: 4        REVIEW OF SYSTEMS     REVIEW OF SYSTEMS  Review of Systems   All other systems reviewed and are negative.        ALLERGIES AND MEDICATIONS     ALLERGIES  Allergies   Allergen Reactions    Penicillins Unknown       MEDICATIONS  Current Outpatient Medications   Medication Sig Dispense Refill    amLODIPine (Norvasc) 10 mg tablet Take 0.5 tablets (5 mg) by mouth 2 times a day. 90 tablet 3    atenolol (Tenormin) 25 mg tablet Take 1 tablet (25 mg) by mouth once daily. 90 tablet 3    cholecalciferol (Vitamin D-3) 25 MCG (1000 UT) tablet Take 1 tablet (25 mcg) by mouth once daily.      lidocaine (lidocaine HCL) 4 % cream Apply topically if needed in the morning, at noon, and at bedtime. Apply 1 inch      metFORMIN (Glucophage) 500 mg tablet Take 1 tablet (500 mg) by mouth once daily. 90 tablet 3    pantoprazole (ProtoNix) 40 mg EC tablet Take 1 tablet (40 mg) by mouth once daily. 90 each 2    semaglutide (Ozempic) 0.25 mg or 0.5 mg(2 mg/1.5 mL) pen injector Inject 0.25 mg under the skin 1 (one) time per week. 1 each 3     No current facility-administered medications for this visit.         PAST HISTORY     PAST MEDICAL HISTORY  She  has no past medical history on file.      PAST SURGICAL HISTORY:  Past Surgical History:   Procedure Laterality Date    EYE SURGERY Right     2023    OTHER SURGICAL HISTORY  09/16/2019    Hysterectomy    OTHER SURGICAL HISTORY  09/16/2019    Cardiac catheterization    OTHER SURGICAL HISTORY  09/16/2019    Breast biopsy    OTHER SURGICAL HISTORY  09/16/2019     "Cholecystectomy       FAMILY HISTORY  Family History   Problem Relation Name Age of Onset    Hypertension Mother      Cancer Mother      Diabetes Mother      Heart attack Mother      Coronary artery disease Brother      Other (Colitis) Brother      Heart attack Other Family history          SOCIAL HISTORY  She  reports that she quit smoking about 39 years ago. Her smoking use included cigarettes. She smoked an average of .25 packs per day. She uses smokeless tobacco. She reports current alcohol use. She reports that she does not use drugs.    Caffeine consumption: 2 cups coffee in am   Alcohol consumption: n  Smoking: n  Marijuana: n  Other drugs: n    PHYSICAL EXAM     VITAL SIGNS: /74   Pulse 92   Resp 18   Ht 1.575 m (5' 2\")   Wt 100 kg (220 lb 8 oz)   SpO2 95%   BMI 40.33 kg/m²        PREVIOUS WEIGHTS:  Wt Readings from Last 3 Encounters:   02/05/24 100 kg (220 lb 8 oz)   01/03/24 98.3 kg (216 lb 12.8 oz)   07/24/23 97.5 kg (215 lb)       Physical Exam  Constitutional: Alert and oriented, cooperative, no obvious distress.   HENT: normocephalic.   Neurologic: AOx3.   psychiatric: appropriate mood and affect.      RESULTS/DATA         ASSESSMENT/PLAN     Ms. Walsh is a 68 y.o. female and  has no past medical history on file. She is following up on her sleep apnea     Problem List Items Addressed This Visit    None      Problem List and Orders  retired, used to be a .      pmhx including arrhythmia, arthritis, back pain, DM?,metabolic syndrome, GERD, HTN.     1- TIFFANY  snoring, un-refreshing sleep, witnessed apnea, gasping and chocking, daytime sleepiness.      -PSG 8/10/21-->moderate TIFFANY, AHI 24, worse in supine and REM.   Reviewed and discussed the above sleep study results and download data and management options in details. All questions answered, patient verbalizes understanding.      -adjust. From Autopap 5-15cwp, rAHI 1, median 9.3, max avg 12.2 to Autopap 8-15cwp. Only a few days " with large leak.   -ordered supplies      -do not drive or operate heavy machinery if drowsy.  -avoid sleeping on your back.   -avoid sedating substances/ medication, alcohol, illicit drugs and tobacco.     2- Obesity  counseled on eating a healthy diet and exercising as tolerated.     3- calf cramping resolved   some calf cramping 2x per yr.      4-insomnia resolved   both sleep onset and sleep maintenance both greatly improved with pap use.   likely multifactorial   counseled on good sleep hygiene practices.   not on medication, has tried melatonin in the past but it did not work well for her.        Follow up in 1yr or sooner as needed

## 2024-02-05 NOTE — PATIENT INSTRUCTIONS
[facility] Sleep Medicine   [facility]  [address] [address]  [phone]       NAME: Billie Walsh   DATE: 2/5/2024     Your Sleep Provider Today: Roberto Jarquin DO  Your Primary Care Physician: Mario Alberto Fountain MD   Your Referring Provider: No ref. provider found    DIAGNOSIS:   1. TIFFANY (obstructive sleep apnea)  Positive Airway Pressure (PAP) Therapy          Thank you for coming to the Sleep Medicine Clinic today! Your sleep medicine provider today was: Roberto Jarquin DO Below is a summary of your treatment plan, other important information, and our contact numbers:      TREATMENT PLAN     Follow up in 1yr or sooner as needed     Instructions - Common TIFFANY Recs: - For your sleep apnea, continue to use your PAP every night and use it whenever you are sleeping.   - Avoid alcohol or sedatives several hours prior to sleeping.   - Get additional supplies for your PAP (e.g., mask, hose, filters) every 3 months or as your insurance allows from your [employer] company. Replacement cushions for your PAP mask can be requested monthly if airseals are an issue.  - Remember to clean your mask, tubings, and water chamber regularly as instructed.  - Avoid driving or operating heavy machinery when drowsy. A person driving while sleepy is five (5) times more likely to have an accident. If you feel sleepy, pull over and take a short power nap (sleep for less than 30 minutes). Otherwise, ask somebody to drive you.        IMPORTANT INFORMATION     Call 911 for medical emergencies.  Our offices are generally open from Monday-Friday, 9 am - 5 pm.  If you need to get in touch with me, you may either call me and my team(number is below) or you can use [employer].  If a referral for a test, for CPAP, or for another specialist was made, and you have not heard about scheduling this within a week, please call scheduling at [phone]).  If you are unable to make your  appointment for clinic or an overnight study, kindly call the office at least 48 hours in advance to cancel and reschedule.  If you are on CPAP, please bring your device's card or the device to each clinic appointment.   There are no supporting services by either the sleep doctors or their staff on weekends and Holidays, or after 5 PM on weekdays.   If you have been asked to come to a sleep study, make sure you bring toiletries, a comfy pillow, and any nighttime medications that you may regularly take. Also be sure to eat dinner before you arrive. We generally do not provide meals.      PRESCRIPTIONS     We require 7 days advanced notice for prescription refills. If we do not receive the request in this time, we cannot guarantee that your medication will be refilled in time.      IMPORTANT PHONE NUMBERS     Sleep Medicine Clinic Fax: [phone]  Appointments (for Pediatric Sleep Clinic): [phone] - option 1  Appointments (for Adult Sleep Clinic): [phone] - option 2  Appointments (For Sleep Studies): [phone] - option 3  Behavioral Sleep Medicine: [phone]  Sleep Surgery: [phone]  ENT (Otolaryngology): [phone]  Headache Clinic (Neurology): [phone]  Neurology: [phone]  Psychiatry: [phone]  Pulmonary Function Testing (PFT) Center: [phone]  Pulmonary Medicine: [phone]  [employer] (DME): [phone]  [employer] (DME): [phone]  [facility] ([facility]): [phone]      OUR ADULT SLEEP MEDICINE TEAM   Please do not hesitate to call the office or sleep nurse with any questions between appointments:    Adult Sleep Nurses (Janae Renee, RN and Shanell Ralph RN):  For clinical questions and refilling prescriptions: [phone]  Email sleep diaries and other documents at: [email or web address]    Adult Sleep Medicine Secretaries:  Susana Elder (For Jonel/Rickey/Hilary/Delmy/Oneal/Marino):   P: [phone]   F: [phone]  Ying Sotomayor (For Correa/[place]): P: [phone]  Fax: [phone]  Luizase Vega (For Jurcevic/Blank): P: [phone]  F: [phone]  Shraddha Kline (For Ruel): P: [phone]  F: [phone]  Gertrude Bellamy (For Deedee/Piper/Zakhary): P: [phone]  F: [phone]  Pat Vazquez (For Kobe/Bryson): P: [phone]  F: [phone]     Adult Sleep Medicine Advanced Practice Providers:  Fabian Davidson ([place])  Lala Saldaña ([facility])  Kiesha Ruiz CNP ([place], [place], [place])  Shanice Richard CNP ([place], [place], [place])  Tiffanie Lipscomb ([place], [place], [place])  Matt Bryson CNP ([place], [place])        OUR SLEEP TESTING LOCATIONS     Our team will contact you to schedule your sleep study, however, you can contact us as follow:  Main Phone Line (scheduling only): [phone]), option 3  Adult and Pediatric Locations  [facility] (6 years and older): [facility] - 4th floor ([address] [place] [address]) After hours line: [phone]  [facility] (Main campus: All ages): [facility], 6th floor. After hours line: [phone]   [place] (5 years and older; younger considered on case-by-case basis): [address]; [address], [address]. Scheduling  After hours line: [phone]   [place] (6 years and older): [address]; [address]; [address]   [place] (6 years and older): [address], [address]  After hours line: [phone]   [facility] (13 years and older) in [place]: [address], 2nd floor  After hours line: [phone]  [facility] (13 year and older): [address]  After hours line: [phone]     Adult Only Locations:   [place] (18 years and older): [address] [facility], 2nd floor   [place] (18 years and older): [address]; 4th floor  After hours line: [phone]  [facility]  "[place] (18 years and older) at [place]: [address] [place] [address]  After hours line: [phone]          CONTACTING YOUR SLEEP MEDICINE PROVIDER     Send a message directly to your provider through \"My Chart\", which is the email service through your  Records Account: [email or web address]   Call [phone] and leave a message. One of the administrative assistants will forward the message to your sleep medicine provider through \"My Chart\" and/or email.     Your sleep medicine provider for this visit was: Roberto Jarquin DO        "

## 2024-02-13 ENCOUNTER — OFFICE VISIT (OUTPATIENT)
Dept: ORTHOPEDIC SURGERY | Facility: CLINIC | Age: 69
End: 2024-02-13
Payer: MEDICARE

## 2024-02-13 ENCOUNTER — HOSPITAL ENCOUNTER (OUTPATIENT)
Dept: RADIOLOGY | Facility: CLINIC | Age: 69
Discharge: HOME | End: 2024-02-13
Payer: MEDICARE

## 2024-02-13 DIAGNOSIS — M17.12 ARTHRITIS OF KNEE, LEFT: ICD-10-CM

## 2024-02-13 DIAGNOSIS — M25.561 RIGHT KNEE PAIN, UNSPECIFIED CHRONICITY: ICD-10-CM

## 2024-02-13 PROBLEM — M17.11 UNILATERAL PRIMARY OSTEOARTHRITIS, RIGHT KNEE: Status: ACTIVE | Noted: 2024-02-13

## 2024-02-13 PROCEDURE — 73564 X-RAY EXAM KNEE 4 OR MORE: CPT | Mod: RT

## 2024-02-13 PROCEDURE — 99214 OFFICE O/P EST MOD 30 MIN: CPT | Performed by: ORTHOPAEDIC SURGERY

## 2024-02-13 PROCEDURE — 73562 X-RAY EXAM OF KNEE 3: CPT | Mod: LT

## 2024-02-13 PROCEDURE — 1159F MED LIST DOCD IN RCRD: CPT | Performed by: ORTHOPAEDIC SURGERY

## 2024-02-13 PROCEDURE — 99214 OFFICE O/P EST MOD 30 MIN: CPT | Mod: 57 | Performed by: ORTHOPAEDIC SURGERY

## 2024-02-13 PROCEDURE — 1160F RVW MEDS BY RX/DR IN RCRD: CPT | Performed by: ORTHOPAEDIC SURGERY

## 2024-02-13 PROCEDURE — 73564 X-RAY EXAM KNEE 4 OR MORE: CPT | Mod: RIGHT SIDE | Performed by: RADIOLOGY

## 2024-02-13 PROCEDURE — 1157F ADVNC CARE PLAN IN RCRD: CPT | Performed by: ORTHOPAEDIC SURGERY

## 2024-02-13 PROCEDURE — 1125F AMNT PAIN NOTED PAIN PRSNT: CPT | Performed by: ORTHOPAEDIC SURGERY

## 2024-02-13 PROCEDURE — 73562 X-RAY EXAM OF KNEE 3: CPT | Mod: LEFT SIDE | Performed by: RADIOLOGY

## 2024-02-13 PROCEDURE — 3008F BODY MASS INDEX DOCD: CPT | Performed by: ORTHOPAEDIC SURGERY

## 2024-02-13 NOTE — PROGRESS NOTES
History of Present Illness:  Patient with known osteoarthritis of the right knee who presents today for repeat evaluation.  The patient notes persistent pain as well as some occasional mechanical symptoms.  The patient has tried the following modalities: Rest, ice, anti-inflammatories, corticosteroid is no longer helping.      She did excellent after her contralateral knee states that is overall been doing very well.    Review of Systems:   GENERAL: Negative for malaise, significant weight loss, fever  MUSCULOSKELETAL: see HPI  NEURO:  Negative    Physical Examination:  Right Knee:  Skin healthy and intact  No gross swelling or ecchymosis  Alignment: Varus  Effusion: Trace  ROM: Slight decrease  Crepitance with range of motion  No pain with internal rotation of the hip  Tenderness to palpation: over medial and lateral joint line and with patellar compression     No laxity to valgus stress  No laxity to varus stress  Negative Lachman´s test  Negative posterior drawer test  Mild pain with Jeanna´s test    Neurovascular exam normal distally  2+ DP pulse and good cap refill    Left knee: Healthy incision full motion    Imaging:  Reviewed, degenerative joint disease noted severe medial compartment DJD on the right side, left knee no evidence of lucency appropriate alignment    Assessment:  Patient with known osteoarthritis of the right knee doing great status post left TKA 1 year ago    Plan:  We reviewed an evidence-based approach to OA of the knee.  We discussed past treatments as well options for future treatment.  We discussed NSAIDS (risks and benefits), low impact activities.    The patient has failed to improve with multiple non-operative modalities.  There is increasing difficulty with activities of daily living and concern for falls.  The patient is endorsing severe pain and disability.      We had a lengthy discussion regarding total knee arthroplasty including the orthopaedic risks, including but not  limited to: stiffness, infection, hematoma, early aseptic loosening, neurologic or vascular injury, clicking, difficulty kneeling and incomplete relief of pain.  We reviewed the medical risks, including but not limited to: deep venous thrombosis, pulmonary embolism, and cardiovascular/pulmonary issues.    We discussed the anticipated longevity of the implants and potential for revision surgery.  We also discussed anticoagulation, rehabilitation goals, and the hospital course.  We discussed the likelihood of opioid analgesics and risks associated with them.    The next step is to obtain medical risk stratification and encouraged the pre-operative information seminar at the hospital.  We are happy to provide assistance and counseling if the patient has any additional concerns.      Did well with aspirin on the contralateral side, did require her rehab stay based on her stairs at home.  She had some nausea postop which may be from the anesthetic and was only on Norco which she did not feel was enough we will start with oxycodone or Percocet.    Sizes 3, 2, 12, 29

## 2024-02-15 ENCOUNTER — TELEPHONE (OUTPATIENT)
Dept: ORTHOPEDIC SURGERY | Facility: CLINIC | Age: 69
End: 2024-02-15
Payer: MEDICARE

## 2024-02-15 DIAGNOSIS — M17.11 PRIMARY OSTEOARTHRITIS OF RIGHT KNEE: Primary | ICD-10-CM

## 2024-02-26 ENCOUNTER — OFFICE VISIT (OUTPATIENT)
Dept: OBSTETRICS AND GYNECOLOGY | Facility: CLINIC | Age: 69
End: 2024-02-26
Payer: MEDICARE

## 2024-02-26 VITALS
WEIGHT: 217 LBS | BODY MASS INDEX: 39.93 KG/M2 | SYSTOLIC BLOOD PRESSURE: 128 MMHG | DIASTOLIC BLOOD PRESSURE: 60 MMHG | HEIGHT: 62 IN

## 2024-02-26 DIAGNOSIS — N39.3 SUI (STRESS URINARY INCONTINENCE, FEMALE): Primary | ICD-10-CM

## 2024-02-26 DIAGNOSIS — N39.3 STRESS INCONTINENCE OF URINE: ICD-10-CM

## 2024-02-26 DIAGNOSIS — N81.6 PELVIC ORGAN PROLAPSE QUANTIFICATION STAGE 3 RECTOCELE: ICD-10-CM

## 2024-02-26 PROCEDURE — 57160 INSERT PESSARY/OTHER DEVICE: CPT | Performed by: NURSE PRACTITIONER

## 2024-02-26 PROCEDURE — 1157F ADVNC CARE PLAN IN RCRD: CPT | Performed by: NURSE PRACTITIONER

## 2024-02-26 PROCEDURE — 99203 OFFICE O/P NEW LOW 30 MIN: CPT | Performed by: NURSE PRACTITIONER

## 2024-02-26 PROCEDURE — 1160F RVW MEDS BY RX/DR IN RCRD: CPT | Performed by: NURSE PRACTITIONER

## 2024-02-26 PROCEDURE — 3078F DIAST BP <80 MM HG: CPT | Performed by: NURSE PRACTITIONER

## 2024-02-26 PROCEDURE — 3074F SYST BP LT 130 MM HG: CPT | Performed by: NURSE PRACTITIONER

## 2024-02-26 PROCEDURE — A4562 PESSARY, NON RUBBER,ANY TYPE: HCPCS | Performed by: NURSE PRACTITIONER

## 2024-02-26 PROCEDURE — 1159F MED LIST DOCD IN RCRD: CPT | Performed by: NURSE PRACTITIONER

## 2024-02-26 PROCEDURE — 1126F AMNT PAIN NOTED NONE PRSNT: CPT | Performed by: NURSE PRACTITIONER

## 2024-02-26 PROCEDURE — 3008F BODY MASS INDEX DOCD: CPT | Performed by: NURSE PRACTITIONER

## 2024-02-26 ASSESSMENT — PAIN SCALES - GENERAL: PAINLEVEL: 0-NO PAIN

## 2024-02-26 NOTE — LETTER
2024     Mario Alberto Fountain MD  [address]  [facility] [address]    Patient: Billie Walsh   YOB: 1955   Date of Visit: 2024       Dear Dr. Mario Alberto Fountain MD:    Thank you for referring Billie Walsh to me for evaluation. Below are my notes for this consultation.  If you have questions, please do not hesitate to call me. I look forward to following your patient along with you.       Sincerely,     Bette Valles, APRN-CNP      CC: No Recipients  ______________________________________________________________________________________    Referred by: PCP  Mario Alberto Fountain MD         CHIEF COMPLAINT:  Urinary incontinence          HISTORY OF PRESENT ILLNESS:  This is a  68 y.o. female,  who presents with urinary incontinence.          Specifically, she describes the following pelvic floor symptoms:          Prolapse: No       - Splinting to urinate: No       - Splinting for bowel movement/stool trapping: No              Incontinence:  Yes             Mixed - CHRISTINA daily and multiple times per day she endorses CHRISTINA episodes > UUI much less frequently               Urinary Symptoms:       - Frequency:  No             # Voids:         - Nocturia: No             # Voids:         - Urgency:  No       - Incomplete emptying:  No       - Hesitancy:  No       - Pain with voiding:  No       - Postvoid dribbling:  No       - Excessive fluid intake: No               History:       - Recurrent UTI:  No       - Hematuria:  No       - Stones:  No       - Kidney Disease:  No                  Bowel Symptoms:       - Regular: Yes - she has daily bowel movements and her stool consistency varies between [place] scale #2 and 5. Does not take any stool softeners or fiber.        - Diarrhea:No       - Constipation: Yes - occasional        - Fecal Incontinence: No       - Flatus Incontinence:  No       - Fecal urgency:  No    Past medical and surgical hx reviewed - pertinent for  metabolic syndrome, GERD, and TIFFANY. Prior hysterectomy but does have both ovaries intact.         Gyn History:  - Menopausal: Yes           Postmenopausal bleeding: No  - Pap up to date: Yes   - Sexually active:  No  - Number of prior vaginal deliveries: 4   Weight of largest baby: 10 pounds   Number of prior operative deliveries: x1 episiotomy   Prior OASI? No  - Number of prior c-sections: 0    - Mammogram up to date: Yes - 7/28/2023 BIRADS category 2 benign.   - Colonoscopy up to date: Yes - 7/22/2020 and normal findings. Due for repeat CRC screening in 10 years in 2030.     OB History    No obstetric history on file.               PHYSICAL EXAMINATION:  No LMP recorded. Patient is postmenopausal.  Body mass index is 39.69 kg/m².    General Appearance: well appearing  Neuro: Alert and oriented     Pelvic:  Genitourinary:  normal external genitalia, Bartholin's glands negative, [place]'s glands negative  Urethra normal meatus, non-tender, no periurethral mass, hypermobile urethra. Negative CST in the supine position.   Vaginal mucosa is normal  Cervix surgically absent  Uterus surgically absent  Atrophy negative    CST negative    POP-Q (in supine position):       Aa -1     Ba -1     C -7              gh 5     pb 4     tvl 8              Ap +1     Bp +1     D X    Rectal: no hemorrhoids, fissures or masses    PVR (by Ultrasound): 20mL   Urine dip: No results found for this or any previous visit.     Patient ID: Billie Walsh is a 68 y.o. female.    Pessary    Date/Time: 2/26/2024 9:48 PM    Performed by: MIK Kirby  Authorized by: MIK Kirby    Consent:     Procedural risks discussed: yes      Patient agrees, verbalizes understanding, and wants to proceed: yes      Consent given by:  Patient  Indication:     Indication for pessary: rectocele and incontinence    Procedure:     Pessary type:  Ring w/ support (and knob)    Pessary size:  5  Outcomes:     How did patient test  pessary?:  Valsalva, voiding, and ambulation.    Patient tolerance of procedure:  Tolerated well, no immediate complications    Post-procedure education provided: yes      Reviewed by: provider        IMPRESSION AND PLAN:  68 y.o. female with SAMY with stress > urge, stage 3 rectocele (asymptomatic) and constipation. Comorbidities include: Metabolic syndrome, GERD, and TIFFANY.     Diagnoses:  #1 Mixed urinary incontinence, stress > urge  #2 Constipation   #3 Rectocele, stage 3 (asymptomatic)     Plan:  1. CHRISTINA, asymptomatic (stage 3) rectocele   - PVR = 20mL by bladder U/S.   - Discussed etiology of CHRISTINA and potential risk factors.  - Reviewed management strategies including PFPT, fitting her with an anti-incontinence ring, urethral bulking injections, and midurethral sling placement.  - We discussed that PFPT would help strengthen the PF muscles to help better support the urethra.   - We discussed the benefits of the anti-incontinence pessary which is a small silicone ring that is placed intravaginally to help support the urethra to prevent CHRISTINA related leakage. There is no increased risk of UTI or vaginal infection with using a pessary. She may learn how to manage the pessary at home on her own with taking the pessary in/out prior to intercourse and recommended maintenance at least every 2 weeks or RTC ever 3-4 months for pessary maintenance.   - Given that she is pending upcoming knee replacement surgery in the next few months she is amenable to being fitted with a pessary to address her CHRISTINA leakage. Of note, she wishes to learn how to manage the pessary at home on her own at next visit.   - Fitted her with a #5 ring with support and knob pessary. This pessary was comfortable and remained in place with valsalva, voiding, movement, ambulation, and coughing. We discussed that the pessary will address both her CHRISTINA > stage 3 rectocele (asymptomatic).     2. Constipation   - We discussed starting Metamucil to trial for x2  weeks to see if this improves her constipation and hard stool consistency.     Follow up in 2 weeks with MIK Kirby for pessary education and wants to learn how to manage the pessary on her own at home.     All questions and concerns were answered and addressed.  The patient expressed understanding and agrees with the plan.     Scribe Attestation  By signing my name below, Jose GE Scribe, attest that this documentation has been prepared under the direction and in the presence of MIK Kirby on 02/26/2024 at 9:35 PM.     IBette, personally performed the services described in the documentation as scribed in my presence and confirm it is both complete and accurate.

## 2024-02-27 NOTE — PROGRESS NOTES
Referred by: PCP  Mario Alberto Fountain MD         CHIEF COMPLAINT:  Urinary incontinence          HISTORY OF PRESENT ILLNESS:  This is a  68 y.o. female,  who presents with urinary incontinence.          Specifically, she describes the following pelvic floor symptoms:          Prolapse: No       - Splinting to urinate: No       - Splinting for bowel movement/stool trapping: No              Incontinence:  Yes             Mixed - CHRISTINA daily and multiple times per day she endorses CHRISTINA episodes > UUI much less frequently               Urinary Symptoms:       - Frequency:  No             # Voids:         - Nocturia: No             # Voids:         - Urgency:  No       - Incomplete emptying:  No       - Hesitancy:  No       - Pain with voiding:  No       - Postvoid dribbling:  No       - Excessive fluid intake: No               History:       - Recurrent UTI:  No       - Hematuria:  No       - Stones:  No       - Kidney Disease:  No                  Bowel Symptoms:       - Regular: Yes - she has daily bowel movements and her stool consistency varies between [place] scale #2 and 5. Does not take any stool softeners or fiber.        - Diarrhea:No       - Constipation: Yes - occasional        - Fecal Incontinence: No       - Flatus Incontinence:  No       - Fecal urgency:  No    Past medical and surgical hx reviewed - pertinent for metabolic syndrome, GERD, and TIFFANY. Prior hysterectomy but does have both ovaries intact.         Gyn History:  - Menopausal: Yes           Postmenopausal bleeding: No  - Pap up to date: Yes   - Sexually active:  No  - Number of prior vaginal deliveries: 4   Weight of largest baby: 10 pounds   Number of prior operative deliveries: x1 episiotomy   Prior OASI? No  - Number of prior c-sections: 0    - Mammogram up to date: Yes - 2023 BIRADS category 2 benign.   - Colonoscopy up to date: Yes - 2020 and normal findings. Due for repeat CRC screening in 10 years in 2030.     OB History     No obstetric history on file.               PHYSICAL EXAMINATION:  No LMP recorded. Patient is postmenopausal.  Body mass index is 39.69 kg/m².    General Appearance: well appearing  Neuro: Alert and oriented     Pelvic:  Genitourinary:  normal external genitalia, Bartholin's glands negative, [place]'s glands negative  Urethra normal meatus, non-tender, no periurethral mass, hypermobile urethra. Negative CST in the supine position.   Vaginal mucosa is normal  Cervix surgically absent  Uterus surgically absent  Atrophy negative    CST negative    POP-Q (in supine position):       Aa -1     Ba -1     C -7              gh 5     pb 4     tvl 8              Ap +1     Bp +1     D X    Rectal: no hemorrhoids, fissures or masses    PVR (by Ultrasound): 20mL   Urine dip: No results found for this or any previous visit.     Patient ID: Billie Walsh is a 68 y.o. female.    Pessary    Date/Time: 2/26/2024 9:48 PM    Performed by: MIK Kirby  Authorized by: MIK Kirby    Consent:     Procedural risks discussed: yes      Patient agrees, verbalizes understanding, and wants to proceed: yes      Consent given by:  Patient  Indication:     Indication for pessary: rectocele and incontinence    Procedure:     Pessary type:  Ring w/ support (and knob)    Pessary size:  5  Outcomes:     How did patient test pessary?:  Valsalva, voiding, and ambulation.    Patient tolerance of procedure:  Tolerated well, no immediate complications    Post-procedure education provided: yes      Reviewed by: provider        IMPRESSION AND PLAN:  68 y.o. female with SAMY with stress > urge, stage 3 rectocele (asymptomatic) and constipation. Comorbidities include: Metabolic syndrome, GERD, and TIFFANY.     Diagnoses:  #1 Mixed urinary incontinence, stress > urge  #2 Constipation   #3 Rectocele, stage 3 (asymptomatic)     Plan:  1. CHRISTINA, asymptomatic (stage 3) rectocele   - PVR = 20mL by bladder U/S.   - Discussed etiology  of CHRISTINA and potential risk factors.  - Reviewed management strategies including PFPT, fitting her with an anti-incontinence ring, urethral bulking injections, and midurethral sling placement.  - We discussed that PFPT would help strengthen the PF muscles to help better support the urethra.   - We discussed the benefits of the anti-incontinence pessary which is a small silicone ring that is placed intravaginally to help support the urethra to prevent CHRISTINA related leakage. There is no increased risk of UTI or vaginal infection with using a pessary. She may learn how to manage the pessary at home on her own with taking the pessary in/out prior to intercourse and recommended maintenance at least every 2 weeks or RTC ever 3-4 months for pessary maintenance.   - Given that she is pending upcoming knee replacement surgery in the next few months she is amenable to being fitted with a pessary to address her CHRISTINA leakage. Of note, she wishes to learn how to manage the pessary at home on her own at next visit.   - Fitted her with a #5 ring with support and knob pessary. This pessary was comfortable and remained in place with valsalva, voiding, movement, ambulation, and coughing. We discussed that the pessary will address both her CHRISTINA > stage 3 rectocele (asymptomatic).     2. Constipation   - We discussed starting Metamucil to trial for x2 weeks to see if this improves her constipation and hard stool consistency.     Follow up in 2 weeks with MIK Kirby for pessary education and wants to learn how to manage the pessary on her own at home.     All questions and concerns were answered and addressed.  The patient expressed understanding and agrees with the plan.     Zhannaibe Attestation  By signing my name below, Jose GE Scribe, attellyn that this documentation has been prepared under the direction and in the presence of MIK Kirby on 02/26/2024 at 9:35 PM.     Bette GE  personally performed the services described in the documentation as scribed in my presence and confirm it is both complete and accurate.

## 2024-03-06 ENCOUNTER — LAB (OUTPATIENT)
Dept: LAB | Facility: LAB | Age: 69
End: 2024-03-06
Payer: MEDICARE

## 2024-03-06 ENCOUNTER — PRE-ADMISSION TESTING (OUTPATIENT)
Dept: PREADMISSION TESTING | Facility: HOSPITAL | Age: 69
End: 2024-03-06
Payer: MEDICARE

## 2024-03-06 VITALS
OXYGEN SATURATION: 93 % | RESPIRATION RATE: 16 BRPM | TEMPERATURE: 97.9 F | HEART RATE: 66 BPM | SYSTOLIC BLOOD PRESSURE: 132 MMHG | HEIGHT: 62 IN | BODY MASS INDEX: 40.81 KG/M2 | DIASTOLIC BLOOD PRESSURE: 73 MMHG | WEIGHT: 221.78 LBS

## 2024-03-06 DIAGNOSIS — Z01.818 PREOP TESTING: ICD-10-CM

## 2024-03-06 DIAGNOSIS — Z01.818 PRE-OP TESTING: Primary | ICD-10-CM

## 2024-03-06 DIAGNOSIS — E88.810 METABOLIC SYNDROME: ICD-10-CM

## 2024-03-06 DIAGNOSIS — E66.09 CLASS 2 OBESITY DUE TO EXCESS CALORIES WITHOUT SERIOUS COMORBIDITY WITH BODY MASS INDEX (BMI) OF 39.0 TO 39.9 IN ADULT: ICD-10-CM

## 2024-03-06 DIAGNOSIS — R63.5 ABNORMAL WEIGHT GAIN: ICD-10-CM

## 2024-03-06 DIAGNOSIS — Z00.00 HEALTH CARE MAINTENANCE: ICD-10-CM

## 2024-03-06 DIAGNOSIS — E74.819 DISORDERS OF GLUCOSE TRANSPORT, UNSPECIFIED (MULTI): ICD-10-CM

## 2024-03-06 DIAGNOSIS — R94.5 ABNORMAL RESULTS OF LIVER FUNCTION STUDIES: ICD-10-CM

## 2024-03-06 LAB
25(OH)D3 SERPL-MCNC: 31 NG/ML (ref 30–100)
ALBUMIN SERPL BCP-MCNC: 4.4 G/DL (ref 3.4–5)
ALP SERPL-CCNC: 106 U/L (ref 33–136)
ALT SERPL W P-5'-P-CCNC: 12 U/L (ref 7–45)
ANION GAP SERPL CALC-SCNC: 13 MMOL/L (ref 10–20)
APPEARANCE UR: CLEAR
AST SERPL W P-5'-P-CCNC: 18 U/L (ref 9–39)
BASOPHILS # BLD AUTO: 0.07 X10*3/UL (ref 0–0.1)
BASOPHILS NFR BLD AUTO: 0.8 %
BILIRUB SERPL-MCNC: 0.9 MG/DL (ref 0–1.2)
BILIRUB UR STRIP.AUTO-MCNC: NEGATIVE MG/DL
BUN SERPL-MCNC: 16 MG/DL (ref 6–23)
CALCIUM SERPL-MCNC: 9.8 MG/DL (ref 8.6–10.3)
CHLORIDE SERPL-SCNC: 105 MMOL/L (ref 98–107)
CHOLEST SERPL-MCNC: 166 MG/DL (ref 0–199)
CHOLESTEROL/HDL RATIO: 3.1
CO2 SERPL-SCNC: 26 MMOL/L (ref 21–32)
COLOR UR: YELLOW
CREAT SERPL-MCNC: 0.65 MG/DL (ref 0.5–1.05)
CREAT UR-MCNC: 47.1 MG/DL (ref 20–320)
EGFRCR SERPLBLD CKD-EPI 2021: >90 ML/MIN/1.73M*2
EOSINOPHIL # BLD AUTO: 0.09 X10*3/UL (ref 0–0.7)
EOSINOPHIL NFR BLD AUTO: 1 %
ERYTHROCYTE [DISTWIDTH] IN BLOOD BY AUTOMATED COUNT: 13.3 % (ref 11.5–14.5)
EST. AVERAGE GLUCOSE BLD GHB EST-MCNC: 114 MG/DL
GLUCOSE SERPL-MCNC: 97 MG/DL (ref 74–99)
GLUCOSE UR STRIP.AUTO-MCNC: NEGATIVE MG/DL
HBA1C MFR BLD: 5.6 %
HCT VFR BLD AUTO: 43.8 % (ref 36–46)
HCV AB SER QL: NONREACTIVE
HDLC SERPL-MCNC: 53.5 MG/DL
HGB BLD-MCNC: 13.6 G/DL (ref 12–16)
IMM GRANULOCYTES # BLD AUTO: 0.02 X10*3/UL (ref 0–0.7)
IMM GRANULOCYTES NFR BLD AUTO: 0.2 % (ref 0–0.9)
INR PPP: 1 (ref 0.9–1.1)
KETONES UR STRIP.AUTO-MCNC: NEGATIVE MG/DL
LDLC SERPL CALC-MCNC: 97 MG/DL
LEUKOCYTE ESTERASE UR QL STRIP.AUTO: ABNORMAL
LYMPHOCYTES # BLD AUTO: 2.36 X10*3/UL (ref 1.2–4.8)
LYMPHOCYTES NFR BLD AUTO: 27.1 %
MAGNESIUM SERPL-MCNC: 2.19 MG/DL (ref 1.6–2.4)
MCH RBC QN AUTO: 27.6 PG (ref 26–34)
MCHC RBC AUTO-ENTMCNC: 31.1 G/DL (ref 32–36)
MCV RBC AUTO: 89 FL (ref 80–100)
MICROALBUMIN UR-MCNC: 8.8 MG/L
MICROALBUMIN/CREAT UR: 18.7 UG/MG CREAT
MONOCYTES # BLD AUTO: 0.58 X10*3/UL (ref 0.1–1)
MONOCYTES NFR BLD AUTO: 6.7 %
NEUTROPHILS # BLD AUTO: 5.59 X10*3/UL (ref 1.2–7.7)
NEUTROPHILS NFR BLD AUTO: 64.2 %
NITRITE UR QL STRIP.AUTO: NEGATIVE
NON HDL CHOLESTEROL: 113 MG/DL (ref 0–149)
NRBC BLD-RTO: 0 /100 WBCS (ref 0–0)
PH UR STRIP.AUTO: 5 [PH]
PLATELET # BLD AUTO: 294 X10*3/UL (ref 150–450)
POTASSIUM SERPL-SCNC: 4.3 MMOL/L (ref 3.5–5.3)
PROT SERPL-MCNC: 7.2 G/DL (ref 6.4–8.2)
PROT UR STRIP.AUTO-MCNC: NEGATIVE MG/DL
PROTHROMBIN TIME: 11 SECONDS (ref 9.8–12.8)
RBC # BLD AUTO: 4.92 X10*6/UL (ref 4–5.2)
RBC # UR STRIP.AUTO: NEGATIVE /UL
RBC #/AREA URNS AUTO: NORMAL /HPF
SODIUM SERPL-SCNC: 140 MMOL/L (ref 136–145)
SP GR UR STRIP.AUTO: 1.01
SQUAMOUS #/AREA URNS AUTO: NORMAL /HPF
TRIGL SERPL-MCNC: 77 MG/DL (ref 0–149)
TSH SERPL-ACNC: 1.85 MIU/L (ref 0.44–3.98)
UROBILINOGEN UR STRIP.AUTO-MCNC: <2 MG/DL
VIT B12 SERPL-MCNC: 421 PG/ML (ref 211–911)
VLDL: 15 MG/DL (ref 0–40)
WBC # BLD AUTO: 8.7 X10*3/UL (ref 4.4–11.3)
WBC #/AREA URNS AUTO: NORMAL /HPF

## 2024-03-06 PROCEDURE — 85025 COMPLETE CBC W/AUTO DIFF WBC: CPT

## 2024-03-06 PROCEDURE — 83735 ASSAY OF MAGNESIUM: CPT

## 2024-03-06 PROCEDURE — 84443 ASSAY THYROID STIM HORMONE: CPT

## 2024-03-06 PROCEDURE — 93005 ELECTROCARDIOGRAM TRACING: CPT

## 2024-03-06 PROCEDURE — 99204 OFFICE O/P NEW MOD 45 MIN: CPT | Performed by: NURSE PRACTITIONER

## 2024-03-06 PROCEDURE — 81001 URINALYSIS AUTO W/SCOPE: CPT

## 2024-03-06 PROCEDURE — 85610 PROTHROMBIN TIME: CPT

## 2024-03-06 PROCEDURE — 82607 VITAMIN B-12: CPT

## 2024-03-06 PROCEDURE — 86803 HEPATITIS C AB TEST: CPT

## 2024-03-06 PROCEDURE — 80053 COMPREHEN METABOLIC PANEL: CPT

## 2024-03-06 PROCEDURE — 82043 UR ALBUMIN QUANTITATIVE: CPT

## 2024-03-06 PROCEDURE — 83036 HEMOGLOBIN GLYCOSYLATED A1C: CPT

## 2024-03-06 PROCEDURE — 82570 ASSAY OF URINE CREATININE: CPT

## 2024-03-06 PROCEDURE — 87081 CULTURE SCREEN ONLY: CPT | Mod: STJLAB | Performed by: ORTHOPAEDIC SURGERY

## 2024-03-06 PROCEDURE — 36415 COLL VENOUS BLD VENIPUNCTURE: CPT

## 2024-03-06 PROCEDURE — 82306 VITAMIN D 25 HYDROXY: CPT

## 2024-03-06 PROCEDURE — 80061 LIPID PANEL: CPT

## 2024-03-06 RX ORDER — CHLORHEXIDINE GLUCONATE ORAL RINSE 1.2 MG/ML
SOLUTION DENTAL
Qty: 473 ML | Refills: 0 | Status: SHIPPED | OUTPATIENT
Start: 2024-03-06 | End: 2024-03-26 | Stop reason: HOSPADM

## 2024-03-06 ASSESSMENT — DUKE ACTIVITY SCORE INDEX (DASI)
CAN YOU WALK A BLOCK OR TWO ON LEVEL GROUND: YES
CAN YOU HAVE SEXUAL RELATIONS: YES
CAN YOU TAKE CARE OF YOURSELF (EAT, DRESS, BATHE, OR USE TOILET): YES
CAN YOU DO HEAVY WORK AROUND THE HOUSE LIKE SCRUBBING FLOORS OR LIFTING AND MOVING HEAVY FURNITURE: YES
CAN YOU PARTICIPATE IN STRENOUS SPORTS LIKE SWIMMING, SINGLES TENNIS, FOOTBALL, BASKETBALL, OR SKIING: NO
DASI METS SCORE: 7.3
CAN YOU PARTICIPATE IN MODERATE RECREATIONAL ACTIVITIES LIKE GOLF, BOWLING, DANCING, DOUBLES TENNIS OR THROWING A BASEBALL OR FOOTBALL: NO
CAN YOU RUN A SHORT DISTANCE: NO
CAN YOU WALK INDOORS, SUCH AS AROUND YOUR HOUSE: YES
CAN YOU DO LIGHT WORK AROUND THE HOUSE LIKE DUSTING OR WASHING DISHES: YES
CAN YOU CLIMB A FLIGHT OF STAIRS OR WALK UP A HILL: YES
CAN YOU DO YARD WORK LIKE RAKING LEAVES, WEEDING OR PUSHING A MOWER: YES
TOTAL_SCORE: 36.7
CAN YOU DO MODERATE WORK AROUND THE HOUSE LIKE VACUUMING, SWEEPING FLOORS OR CARRYING GROCERIES: YES

## 2024-03-06 ASSESSMENT — CHADS2 SCORE
PRIOR STROKE OR TIA OR THROMBOEMBOLISM: NO
CHADS2 SCORE: 1
AGE GREATER THAN OR EQUAL TO 75: NO
DIABETES: NO
HYPERTENSION: YES
CHF: NO

## 2024-03-06 ASSESSMENT — LIFESTYLE VARIABLES: SMOKING_STATUS: NONSMOKER

## 2024-03-06 ASSESSMENT — ACTIVITIES OF DAILY LIVING (ADL): ADL_SCORE: 0

## 2024-03-06 ASSESSMENT — ENCOUNTER SYMPTOMS
GASTROINTESTINAL NEGATIVE: 1
CARDIOVASCULAR NEGATIVE: 1
EYES NEGATIVE: 1
CONSTITUTIONAL NEGATIVE: 1
NECK NEGATIVE: 1
NUMBNESS: 1

## 2024-03-06 ASSESSMENT — PAIN SCALES - GENERAL: PAINLEVEL_OUTOF10: 0 - NO PAIN

## 2024-03-06 ASSESSMENT — PAIN - FUNCTIONAL ASSESSMENT: PAIN_FUNCTIONAL_ASSESSMENT: 0-10

## 2024-03-06 NOTE — H&P (VIEW-ONLY)
CPM/PAT Evaluation       Name: Billie Walsh (Billie Walsh)  /Age: 1955/68 y.o.     In-Person       Chief Complaint: right knee pain    HPI  This is a pleasant 69 yo with Pmhx of of obesity, GERD, HTN, TIFFANY, and OA of the right knee.  Pt states she had her left knee replaced last February.  She reports having pain in right knee most of the time.  Pain is worse with activity and climbing stairs.  She denies recent fever or chills.    Past Medical History:   Diagnosis Date    GERD (gastroesophageal reflux disease)     Hypertension     Obesity     TIFFANY (obstructive sleep apnea)     Osteoarthritis     PONV (postoperative nausea and vomiting)     Stress incontinence        Past Surgical History:   Procedure Laterality Date    BREAST BIOPSY      CARDIAC CATHETERIZATION      CHOLECYSTECTOMY      EYE SURGERY Right         HYSTERECTOMY      KNEE ARTHROPLASTY      OTHER SURGICAL HISTORY  2019    Hysterectomy    OTHER SURGICAL HISTORY  2019    Cardiac catheterization    OTHER SURGICAL HISTORY  2019    Breast biopsy    OTHER SURGICAL HISTORY  2019    Cholecystectomy       Patient  reports being sexually active.    Family History   Problem Relation Name Age of Onset    Hypertension Mother      Cancer Mother      Diabetes Mother      Heart attack Mother      Heart disease Father      Coronary artery disease Brother      Other (Colitis) Brother      Hypertension Brother      Heart attack Other Family history        Allergies   Allergen Reactions    Penicillins Unknown     Childhood reaction       Prior to Admission medications    Medication Sig Start Date End Date Taking? Authorizing Provider   amLODIPine (Norvasc) 10 mg tablet Take 0.5 tablets (5 mg) by mouth 2 times a day. 23  Steve Recinos DO   atenolol (Tenormin) 25 mg tablet Take 1 tablet (25 mg) by mouth once daily. 23  Steve Recinos DO   cholecalciferol (Vitamin D-3) 25 MCG (1000 UT) tablet Take 1  tablet (25 mcg) by mouth once daily.    Historical Provider, MD   lidocaine (lidocaine HCL) 4 % cream Apply 0.1 g topically 3 times a day as needed for mild pain (1 - 3) or moderate pain (4 - 6). Apply 1 inch 7/8/20   Historical Provider, MD   metFORMIN (Glucophage) 500 mg tablet Take 1 tablet (500 mg) by mouth once daily. 7/7/23 7/1/24  Steve Recinos,    pantoprazole (ProtoNix) 40 mg EC tablet Take 1 tablet (40 mg) by mouth once daily. 1/3/24   Mario Alberto Fountain MD   semaglutide (Ozempic) 0.25 mg or 0.5 mg(2 mg/1.5 mL) pen injector Inject 0.25 mg under the skin 1 (one) time per week. 1/3/24   Mario Alberto Fountain MD        PAT ROS:   Constitutional:   neg    Neuro/Psych:    Pt also reports she has a pinched nerve in her neck   numbness (occasional in hands pt thinks she has carpal tunnel)  Eyes:   neg    Ears:   neg    Nose:   neg    Mouth:   neg    Throat:   neg    Neck:   neg    Cardio:   neg    Respiratory:    Pt has hx of TIFFANY uses CPAP  Endocrine:    Hx of metabolic syndrome  GI:   neg    :    Stress incontinence  Pt reports just having a pessary placed by her OB  Musculoskeletal:    See HPI  Pt reports having left knee replaced- short period in rehab after surgery  Hematologic:   neg    Skin:  neg        Physical Exam  Constitutional:       Appearance: Normal appearance.   HENT:      Head: Normocephalic and atraumatic.      Nose: Nose normal.      Mouth/Throat:      Mouth: Mucous membranes are moist.   Eyes:      Pupils: Pupils are equal, round, and reactive to light.   Cardiovascular:      Rate and Rhythm: Normal rate and regular rhythm.   Pulmonary:      Effort: Pulmonary effort is normal.      Breath sounds: Normal breath sounds.   Abdominal:      General: Bowel sounds are normal.      Palpations: Abdomen is soft.      Comments: obese   Musculoskeletal:      Cervical back: Normal range of motion.   Skin:     General: Skin is warm and dry.   Neurological:      General: No focal deficit present.       Mental Status: She is alert and oriented to person, place, and time.   Psychiatric:         Mood and Affect: Mood normal.         Behavior: Behavior normal.        Airway full ROM  Anesthesia:  nausea  Teeth: intact  ECG: NSR no acute changes from 3/2020  Lab Results   Component Value Date    GLUCOSE 97 03/06/2024    CALCIUM 9.8 03/06/2024     03/06/2024    K 4.3 03/06/2024    CO2 26 03/06/2024     03/06/2024    BUN 16 03/06/2024    CREATININE 0.65 03/06/2024     Lab Results   Component Value Date    WBC 8.7 03/06/2024    HGB 13.6 03/06/2024    HCT 43.8 03/06/2024    MCV 89 03/06/2024     03/06/2024     Lab Results   Component Value Date    INR 1.0 03/06/2024    INR 1.0 01/30/2023    PROTIME 11.0 03/06/2024    PROTIME 11.9 01/30/2023         Visit Vitals  /73   Pulse 66   Temp 36.6 °C (97.9 °F) (Temporal)   Resp 16       DASI Risk Score      Flowsheet Row Most Recent Value   DASI SCORE 36.7   METS Score (Will be calculated only when all the questions are answered) 7.3          Caprini DVT Assessment      Flowsheet Row Most Recent Value   DVT Score 11   Current Status Elective major lower extremity arthroplasty   History Prior major surgery   Age 60-75 years   BMI 31-40 (Obesity)          Modified Frailty Index      Flowsheet Row Most Recent Value   Modified Frailty Index Calculator .0909          CHADS2 Stroke Risk  Current as of 24 minutes ago        N/A 3 - 100%: High Risk   2 - 3%: Medium Risk   0 - 2%: Low Risk     Last Change: N/A          This score determines the patient's risk of having a stroke if the patient has atrial fibrillation.        This score is not applicable to this patient. Components are not calculated.          Revised Cardiac Risk Index      Flowsheet Row Most Recent Value   Revised Cardiac Risk Calculator 0          Apfel Simplified Score    No data to display       Risk Analysis Index Results This Encounter         3/6/2024  1026             OLIVEROS Cancer History:  Patient does not indicate history of cancer    Total Risk Analysis Index Score Without Cancer: 20    Total Risk Analysis Index Score: 20          Stop Bang Score      Flowsheet Row Most Recent Value   Do you snore loudly? 0   Do you often feel tired or fatigued after your sleep? 0   Has anyone ever observed you stop breathing in your sleep? 1   Do you have or are you being treated for high blood pressure? 1   Recent BMI (Calculated) 40.6   Is BMI greater than 35 kg/m2? 1=Yes   Age older than 50 years old? 1=Yes   Is your neck circumference greater than 17 inches (Male) or 16 inches (Female)? 0   Gender - Male 0=No   STOP-BANG Total Score 4            Assessment and Plan:     Plan for OR on 3/25 for right TKR  CBC with diff, INR, CMP, MRSA,

## 2024-03-06 NOTE — PREPROCEDURE INSTRUCTIONS
Medication List            Accurate as of March 6, 2024 10:49 AM. Always use your most recent med list.                amLODIPine 10 mg tablet  Commonly known as: Norvasc  Take 0.5 tablets (5 mg) by mouth 2 times a day.  Medication Adjustments for Surgery: Take morning of surgery with sip of water, no other fluids     atenolol 25 mg tablet  Commonly known as: Tenormin  Take 1 tablet (25 mg) by mouth once daily.  Medication Adjustments for Surgery: Take morning of surgery with sip of water, no other fluids     chlorhexidine 0.12 % solution  Commonly known as: Peridex  Sig: 15 mls swish and spit the night before surgery and 15mls swish and spit the morning of surgery do not swallow  Notes to patient: Take as instructed     cholecalciferol 25 MCG (1000 UT) tablet  Commonly known as: Vitamin D-3  Medication Adjustments for Surgery: Stop 7 days before surgery     lidocaine 4 % cream  Commonly known as: LMX  Medication Adjustments for Surgery: Continue until night before surgery     metFORMIN 500 mg tablet  Commonly known as: Glucophage  Take 1 tablet (500 mg) by mouth once daily.  Medication Adjustments for Surgery: Stop 1 day before surgery     Ozempic 0.25 mg or 0.5 mg(2 mg/1.5 mL) pen injector  Generic drug: semaglutide  Inject 0.25 mg under the skin 1 (one) time per week.  Medication Adjustments for Surgery: Stop 7 days before surgery     pantoprazole 40 mg EC tablet  Commonly known as: ProtoNix  Take 1 tablet (40 mg) by mouth once daily.  Medication Adjustments for Surgery: Take morning of surgery with sip of water, no other fluids                          PRE-OPERATIVE INSTRUCTIONS    You will receive notification one business day prior to your procedure to confirm your arrival time. It is important that you answer your phone and/or check your messages during this time. If you do not hear from the surgery center by 5 pm. the day before your procedure, please call [phone].     Please enter the building  through the Outpatient entrance and take the elevator off the lobby to the 2nd floor then check in at the Outpatient Surgery desk on the 2nd floor.    INSTRUCTIONS:  Talk to your surgeon for instructions if you should stop your aspirin, blood thinner, or diabetes medicines.  DO NOT take any multivitamins or over the counter supplements for 7-10 days before surgery.  If not being admitted, you must have an adult immediately available to drive you home after surgery. We also highly recommend you have someone stay with you for the entire day and night of your surgery.  For children having surgery, a parent or legal guardian must accompany them to the surgery center. If this is not possible, please call [phone] to make additional arrangements.  For adults who are unable to consent or make medical decisions for themselves, a legal guardian or Power of  must accompany them to the surgery center. If this is not possible, please call [phone] to make additional arrangements.  Wear comfortable, loose fitting clothing.  All jewelry and piercings must be removed. If you are unable to remove an item or have a dermal piercing, please be sure to tell the nurse when you arrive for surgery.  Nail polish and make-up must be removed.  Avoid smoking or consuming alcohol for 24 hours before surgery.  To help prevent infection, please take a shower/bath and wash your hair the night before and/or morning of surgery (or follow other specific bathing instructions provided).    Preoperative Fasting Guidelines    Why must I stop eating and drinking near surgery time?  With sedation, food or liquid in your stomach can enter your lungs causing serious complications  Increases nausea and vomiting    When do I need to stop eating and drinking before my surgery?  Do not eat any solid food after midnight the night before your surgery/procedure.  You may have up to TEN ounces of clear liquid until TWO hours before your instructed  arrival time to the hospital.  This includes water, black tea/coffee, (no milk or cream) apple juice, and electrolyte drinks (Gatorade).   You may chew gum until TWO hours before your surgery/procedure    If you have any questions or concerns, please call Pre-Admission Testing at [phone].

## 2024-03-08 LAB
ATRIAL RATE: 67 BPM
P AXIS: 34 DEGREES
P OFFSET: 185 MS
P ONSET: 127 MS
PR INTERVAL: 184 MS
Q ONSET: 219 MS
QRS COUNT: 11 BEATS
QRS DURATION: 90 MS
QT INTERVAL: 406 MS
QTC CALCULATION(BAZETT): 429 MS
QTC FREDERICIA: 421 MS
R AXIS: 10 DEGREES
T AXIS: 30 DEGREES
T OFFSET: 422 MS
VENTRICULAR RATE: 67 BPM

## 2024-03-09 LAB — STAPHYLOCOCCUS SPEC CULT: NORMAL

## 2024-03-11 ENCOUNTER — OFFICE VISIT (OUTPATIENT)
Dept: OBSTETRICS AND GYNECOLOGY | Facility: CLINIC | Age: 69
End: 2024-03-11
Payer: MEDICARE

## 2024-03-11 VITALS
DIASTOLIC BLOOD PRESSURE: 70 MMHG | BODY MASS INDEX: 40.67 KG/M2 | HEIGHT: 62 IN | SYSTOLIC BLOOD PRESSURE: 122 MMHG | WEIGHT: 221 LBS

## 2024-03-11 DIAGNOSIS — N39.3 SUI (STRESS URINARY INCONTINENCE, FEMALE): ICD-10-CM

## 2024-03-11 DIAGNOSIS — N32.81 OAB (OVERACTIVE BLADDER): ICD-10-CM

## 2024-03-11 DIAGNOSIS — N39.41 URGE INCONTINENCE: ICD-10-CM

## 2024-03-11 DIAGNOSIS — N81.6 PELVIC ORGAN PROLAPSE QUANTIFICATION STAGE 3 RECTOCELE: Primary | ICD-10-CM

## 2024-03-11 PROCEDURE — 3008F BODY MASS INDEX DOCD: CPT | Performed by: NURSE PRACTITIONER

## 2024-03-11 PROCEDURE — 3078F DIAST BP <80 MM HG: CPT | Performed by: NURSE PRACTITIONER

## 2024-03-11 PROCEDURE — 1157F ADVNC CARE PLAN IN RCRD: CPT | Performed by: NURSE PRACTITIONER

## 2024-03-11 PROCEDURE — 1126F AMNT PAIN NOTED NONE PRSNT: CPT | Performed by: NURSE PRACTITIONER

## 2024-03-11 PROCEDURE — 1160F RVW MEDS BY RX/DR IN RCRD: CPT | Performed by: NURSE PRACTITIONER

## 2024-03-11 PROCEDURE — 99213 OFFICE O/P EST LOW 20 MIN: CPT | Performed by: NURSE PRACTITIONER

## 2024-03-11 PROCEDURE — 1159F MED LIST DOCD IN RCRD: CPT | Performed by: NURSE PRACTITIONER

## 2024-03-11 PROCEDURE — 3074F SYST BP LT 130 MM HG: CPT | Performed by: NURSE PRACTITIONER

## 2024-03-12 NOTE — PROGRESS NOTES
Pessary Check  This is a 68 y.o. with a #5 ring with support and knob pessary in place to help manage CHRISTINA and stage 3 rectocele here for a routine pessary check.    Date of last check: 2/26/2024 - Fitted the patient with a pessary to manage CHRISTINA > stage 3 rectocele (asymptomatic). We discussed starting Metamucil 2x/day to help prevent constipation.     Today she reports:  Pessary comfortable: Yes  Vaginal bleeding: No  Abnormal vaginal discharge: No  Using vaginal estrogen: No    Urinary Symptoms:  - Endorses persistent CHRISTINA leakage; however CHRISTINA has improved in degree slightly with fewer CHRISTINA episodes.   - Reports CHRISTINA with coughing and no CHRISTINA with positional changes (I.e. from seated position to standing).   - She reports rare UUI but states she knows when this will occur because she did not listen to her first urge to void.   - Drinks water (1 quart) and decaf iced tea throughout the day.     Bowel Symptoms:   - Denies experiencing constipation now and reports that she did not start the Metamucil; constipation resolved without intervention.     Interval History:  - Planning for a knee replacement surgery with orthopedics on 3/25/2024 (Dr. Jose Gallardo @ [facility]). She will be scheduled for rehabilitative PT after this.     Exam:  Physical Exam  Constitutional:       Appearance: Normal appearance.   HENT:      Head: Normocephalic and atraumatic.   Pulmonary:      Effort: Pulmonary effort is normal.   Psychiatric:         Mood and Affect: Mood normal.         Behavior: Behavior normal.         Thought Content: Thought content normal.         Judgment: Judgment normal.          Assessment/Plan:  68 year old female with CHRISTINA and a stage 3 rectocele (asymptomatic) managed with a pessary. She has rare OAB/UUI and resolved constipation. Comorbidities include: Metabolic syndrome, GERD, and TIFFANY.      Diagnoses:  #1 Stress urinary incontinence  #2 Rectocele, stage 3   #3 Constipation      Plan:  1. CHRISTINA, asymptomatic (stage 3)  rectocele   - #5 ring with support pessary in place to help manage CHRISTINA > POP (asymptomatic).  - Given that she is continuing to endorse CHRISTINA despite having an anti-incontinence pessary in place, we discussed alternative treatment options to consider such as PFPT or third-line procedures such as a midurethral sling vs. Bulkamid.   - Patient is amenable to continue using the pessary at this time until that will give her time to reassess her CHRISTINA symptoms since she will be completing her orthopedic surgery and rehabilitative PT scheduled after her knee replacement on 3/25/2024 with Dr. Jose Gallardo @ [facility] and would like to potentially consider a CHRISTINA procedure (I.e. TVT vs. Bulkamid) in June 2024.     2. Constipation  - Resolved constipation without starting daily fiber supplement.     3. OAB, UUI  - The patient endorses rare UUI and attributes this to not paying attention to the first urge to void; primarily bothered by CHRISTINA leakage.   - We discussed OAB lifestyle changes (i.e., trying to limit fluids to 60oz in total per day, timed voiding every 2-3 hours, stop drinking fluids 3 hours prior to bedtime, and avoiding/limiting bladder irritants such as caffeine, nicotine, artificial sweeteners, acidic/spicy foods, and alcohol).    She will return to the office in 10 weeks for a pessary recheck or sooner should she have any problems.    All questions and concerns were answered and addressed.    Scribe Attestation  By signing my name below, Jose GE Scribe, attest that this documentation has been prepared under the direction and in the presence of MIK Kirby on 03/11/2024 at 9:38 PM.     IBette, personally performed the services described in the documentation as scribed in my presence and confirm it is both complete and accurate.

## 2024-03-15 ENCOUNTER — OFFICE VISIT (OUTPATIENT)
Dept: PRIMARY CARE | Facility: CLINIC | Age: 69
End: 2024-03-15
Payer: MEDICARE

## 2024-03-15 ENCOUNTER — HOSPITAL ENCOUNTER (OUTPATIENT)
Dept: RADIOLOGY | Facility: HOSPITAL | Age: 69
Discharge: HOME | End: 2024-03-15
Payer: MEDICARE

## 2024-03-15 VITALS
OXYGEN SATURATION: 96 % | DIASTOLIC BLOOD PRESSURE: 80 MMHG | BODY MASS INDEX: 40.56 KG/M2 | RESPIRATION RATE: 16 BRPM | HEART RATE: 64 BPM | HEIGHT: 62 IN | SYSTOLIC BLOOD PRESSURE: 124 MMHG | WEIGHT: 220.4 LBS | TEMPERATURE: 98.4 F

## 2024-03-15 DIAGNOSIS — Z01.818 PREOP GENERAL PHYSICAL EXAM: ICD-10-CM

## 2024-03-15 DIAGNOSIS — E66.01 MORBID (SEVERE) OBESITY DUE TO EXCESS CALORIES (MULTI): ICD-10-CM

## 2024-03-15 DIAGNOSIS — E78.49 OTHER HYPERLIPIDEMIA: Primary | ICD-10-CM

## 2024-03-15 DIAGNOSIS — M17.11 ARTHRITIS OF KNEE, RIGHT: ICD-10-CM

## 2024-03-15 DIAGNOSIS — E78.49 OTHER HYPERLIPIDEMIA: ICD-10-CM

## 2024-03-15 DIAGNOSIS — I10 PRIMARY HYPERTENSION: ICD-10-CM

## 2024-03-15 DIAGNOSIS — E88.810 METABOLIC SYNDROME: ICD-10-CM

## 2024-03-15 PROCEDURE — 3074F SYST BP LT 130 MM HG: CPT | Performed by: INTERNAL MEDICINE

## 2024-03-15 PROCEDURE — 1159F MED LIST DOCD IN RCRD: CPT | Performed by: INTERNAL MEDICINE

## 2024-03-15 PROCEDURE — 75571 CT HRT W/O DYE W/CA TEST: CPT

## 2024-03-15 PROCEDURE — 1160F RVW MEDS BY RX/DR IN RCRD: CPT | Performed by: INTERNAL MEDICINE

## 2024-03-15 PROCEDURE — 99215 OFFICE O/P EST HI 40 MIN: CPT | Performed by: INTERNAL MEDICINE

## 2024-03-15 PROCEDURE — 1157F ADVNC CARE PLAN IN RCRD: CPT | Performed by: INTERNAL MEDICINE

## 2024-03-15 PROCEDURE — 3008F BODY MASS INDEX DOCD: CPT | Performed by: INTERNAL MEDICINE

## 2024-03-15 PROCEDURE — 3079F DIAST BP 80-89 MM HG: CPT | Performed by: INTERNAL MEDICINE

## 2024-03-15 PROCEDURE — 1036F TOBACCO NON-USER: CPT | Performed by: INTERNAL MEDICINE

## 2024-03-15 ASSESSMENT — ENCOUNTER SYMPTOMS
SORE THROAT: 0
COUGH: 0
ADENOPATHY: 0
ABDOMINAL PAIN: 0
CHEST TIGHTNESS: 0
DIZZINESS: 0
PALPITATIONS: 0
CHILLS: 0
DYSURIA: 0
ARTHRALGIAS: 0
WEAKNESS: 0
FATIGUE: 0
JOINT SWELLING: 0
VOMITING: 0
WHEEZING: 0
CONFUSION: 0
SHORTNESS OF BREATH: 0
CONSTIPATION: 0
SLEEP DISTURBANCE: 0
UNEXPECTED WEIGHT CHANGE: 0
DIARRHEA: 0
NAUSEA: 0

## 2024-03-15 NOTE — PROGRESS NOTES
Subjective   Billie Walsh is a 68 y.o. female who presents for Medical Clearance.  Patient is here today for Pre Surgical Medical Clearance. Orthopedic Surgery on 03.25.24  Pre-admit  [facility]  Jose Gallardo MD  Principal problem  Unilateral primary osteoarthritis, right knee  Patient stated is feeling good today.          Review of Systems   Constitutional:  Negative for chills, fatigue and unexpected weight change.        Comment   HENT:  Negative for congestion, ear pain and sore throat.    Respiratory:  Negative for cough, chest tightness, shortness of breath and wheezing.    Cardiovascular:  Negative for palpitations and leg swelling.   Gastrointestinal:  Negative for abdominal pain, constipation, diarrhea, nausea and vomiting.   Genitourinary:  Negative for dysuria and urgency.   Musculoskeletal:  Negative for arthralgias and joint swelling.   Skin:  Negative for rash.   Neurological:  Negative for dizziness and weakness.   Hematological:  Negative for adenopathy.   Psychiatric/Behavioral:  Negative for confusion and sleep disturbance.    All other systems reviewed and are negative.      Objective   Physical Exam  Constitutional:       Appearance: Normal appearance.   HENT:      Head: Normocephalic and atraumatic.   Eyes:      Pupils: Pupils are equal, round, and reactive to light.   Cardiovascular:      Rate and Rhythm: Normal rate and regular rhythm.   Pulmonary:      Effort: Pulmonary effort is normal.      Breath sounds: Normal breath sounds.   Musculoskeletal:         General: Normal range of motion.      Cervical back: Normal range of motion and neck supple.   Skin:     General: Skin is warm.   Neurological:      General: No focal deficit present.      Mental Status: She is alert and oriented to person, place, and time.   Psychiatric:         Mood and Affect: Mood normal.         Behavior: Behavior normal.     /80 (BP Location: Left arm, Patient Position: Sitting)   Pulse 64  "  Temp 36.9 °C (98.4 °F)   Resp 16   Ht 1.575 m (5' 2\")   Wt 100 kg (220 lb 6.4 oz)   SpO2 96%   BMI 40.31 kg/m²   Lab Results   Component Value Date    WBC 8.7 03/06/2024    HGB 13.6 03/06/2024    HCT 43.8 03/06/2024    MCV 89 03/06/2024     03/06/2024     Lab Results   Component Value Date    GLUCOSE 97 03/06/2024    CALCIUM 9.8 03/06/2024     03/06/2024    K 4.3 03/06/2024    CO2 26 03/06/2024     03/06/2024    BUN 16 03/06/2024    CREATININE 0.65 03/06/2024     Lab Results   Component Value Date    ALT 12 03/06/2024    AST 18 03/06/2024    ALKPHOS 106 03/06/2024    BILITOT 0.9 03/06/2024     Lab Results   Component Value Date    CHOL 166 03/06/2024    CHOL 171 03/23/2023    CHOL 173 03/08/2022     Lab Results   Component Value Date    HDL 53.5 03/06/2024    HDL 49.5 03/23/2023    HDL 50.0 03/08/2022     Lab Results   Component Value Date    LDLCALC 97 03/06/2024     Lab Results   Component Value Date    TRIG 77 03/06/2024    TRIG 110 03/23/2023    TRIG 96 03/08/2022     No components found for: \"CHOLHDL\"      Assessment/Plan   Problem List Items Addressed This Visit       Arthritis of knee, right    Hyperlipemia - Primary    Relevant Orders    CT cardiac scoring wo IV contrast    HTN (hypertension)    Metabolic syndrome    Morbid (severe) obesity due to excess calories (CMS/[facility])    Preop general physical exam     Medically cleared  for surgery            "

## 2024-03-21 ENCOUNTER — TELEPHONE (OUTPATIENT)
Dept: PRIMARY CARE | Facility: CLINIC | Age: 69
End: 2024-03-21

## 2024-03-21 ENCOUNTER — OFFICE VISIT (OUTPATIENT)
Dept: PRIMARY CARE | Facility: CLINIC | Age: 69
End: 2024-03-21
Payer: MEDICARE

## 2024-03-21 VITALS
BODY MASS INDEX: 40.48 KG/M2 | HEART RATE: 76 BPM | OXYGEN SATURATION: 96 % | WEIGHT: 220 LBS | DIASTOLIC BLOOD PRESSURE: 62 MMHG | RESPIRATION RATE: 16 BRPM | SYSTOLIC BLOOD PRESSURE: 120 MMHG | HEIGHT: 62 IN | TEMPERATURE: 97.7 F

## 2024-03-21 DIAGNOSIS — Z00.00 MEDICARE ANNUAL WELLNESS VISIT, SUBSEQUENT: Primary | ICD-10-CM

## 2024-03-21 DIAGNOSIS — E78.49 OTHER HYPERLIPIDEMIA: ICD-10-CM

## 2024-03-21 DIAGNOSIS — E55.9 VITAMIN D DEFICIENCY: ICD-10-CM

## 2024-03-21 DIAGNOSIS — M81.0 AGE-RELATED OSTEOPOROSIS WITHOUT CURRENT PATHOLOGICAL FRACTURE: ICD-10-CM

## 2024-03-21 DIAGNOSIS — Z12.11 SCREENING FOR COLON CANCER: ICD-10-CM

## 2024-03-21 DIAGNOSIS — Z12.31 ENCOUNTER FOR SCREENING MAMMOGRAM FOR MALIGNANT NEOPLASM OF BREAST: ICD-10-CM

## 2024-03-21 DIAGNOSIS — I10 HYPERTENSION, UNSPECIFIED TYPE: ICD-10-CM

## 2024-03-21 DIAGNOSIS — E66.01 MORBID (SEVERE) OBESITY DUE TO EXCESS CALORIES (MULTI): ICD-10-CM

## 2024-03-21 DIAGNOSIS — Z13.820 SCREENING FOR OSTEOPOROSIS: ICD-10-CM

## 2024-03-21 DIAGNOSIS — E88.810 METABOLIC SYNDROME: ICD-10-CM

## 2024-03-21 PROBLEM — R20.2 NUMBNESS AND TINGLING SENSATION OF SKIN: Status: ACTIVE | Noted: 2024-03-21

## 2024-03-21 PROBLEM — E66.9 CLASS 2 OBESITY: Status: ACTIVE | Noted: 2024-03-21

## 2024-03-21 PROBLEM — Z86.79 HISTORY OF HYPERTENSION: Status: RESOLVED | Noted: 2024-03-21 | Resolved: 2024-03-21

## 2024-03-21 PROBLEM — K92.9 DIGESTIVE SYSTEM DISORDER: Status: ACTIVE | Noted: 2024-03-21

## 2024-03-21 PROBLEM — E66.9 OBESITY WITH BODY MASS INDEX 30 OR GREATER: Status: ACTIVE | Noted: 2023-02-19

## 2024-03-21 PROBLEM — N81.6 HERNIATION OF RECTUM INTO VAGINA: Status: ACTIVE | Noted: 2024-03-21

## 2024-03-21 PROBLEM — E66.812 CLASS 2 OBESITY: Status: ACTIVE | Noted: 2024-03-21

## 2024-03-21 PROBLEM — R20.0 NUMBNESS AND TINGLING SENSATION OF SKIN: Status: ACTIVE | Noted: 2024-03-21

## 2024-03-21 PROBLEM — E66.9 CLASS 2 OBESITY: Status: RESOLVED | Noted: 2024-03-21 | Resolved: 2024-03-21

## 2024-03-21 PROBLEM — R63.5 ABNORMAL WEIGHT GAIN: Status: ACTIVE | Noted: 2024-03-06

## 2024-03-21 PROBLEM — E74.819 DISORDERS OF GLUCOSE TRANSPORT, UNSPECIFIED (MULTI): Status: ACTIVE | Noted: 2024-03-06

## 2024-03-21 PROBLEM — N32.81 OVERACTIVE BLADDER: Status: ACTIVE | Noted: 2024-03-21

## 2024-03-21 PROBLEM — Z86.79 HISTORY OF HYPERTENSION: Status: ACTIVE | Noted: 2024-03-21

## 2024-03-21 PROBLEM — E66.812 CLASS 2 OBESITY: Status: RESOLVED | Noted: 2024-03-21 | Resolved: 2024-03-21

## 2024-03-21 PROBLEM — S69.90XA THUMB INJURY: Status: ACTIVE | Noted: 2023-02-10

## 2024-03-21 PROCEDURE — G0446 INTENS BEHAVE THER CARDIO DX: HCPCS | Performed by: INTERNAL MEDICINE

## 2024-03-21 PROCEDURE — 1123F ACP DISCUSS/DSCN MKR DOCD: CPT | Performed by: INTERNAL MEDICINE

## 2024-03-21 PROCEDURE — 1157F ADVNC CARE PLAN IN RCRD: CPT | Performed by: INTERNAL MEDICINE

## 2024-03-21 PROCEDURE — 3074F SYST BP LT 130 MM HG: CPT | Performed by: INTERNAL MEDICINE

## 2024-03-21 PROCEDURE — G0439 PPPS, SUBSEQ VISIT: HCPCS | Performed by: INTERNAL MEDICINE

## 2024-03-21 PROCEDURE — 1159F MED LIST DOCD IN RCRD: CPT | Performed by: INTERNAL MEDICINE

## 2024-03-21 PROCEDURE — 3078F DIAST BP <80 MM HG: CPT | Performed by: INTERNAL MEDICINE

## 2024-03-21 PROCEDURE — 99214 OFFICE O/P EST MOD 30 MIN: CPT | Performed by: INTERNAL MEDICINE

## 2024-03-21 PROCEDURE — G0444 DEPRESSION SCREEN ANNUAL: HCPCS | Performed by: INTERNAL MEDICINE

## 2024-03-21 PROCEDURE — 1036F TOBACCO NON-USER: CPT | Performed by: INTERNAL MEDICINE

## 2024-03-21 PROCEDURE — 1160F RVW MEDS BY RX/DR IN RCRD: CPT | Performed by: INTERNAL MEDICINE

## 2024-03-21 PROCEDURE — 3008F BODY MASS INDEX DOCD: CPT | Performed by: INTERNAL MEDICINE

## 2024-03-21 PROCEDURE — 1170F FXNL STATUS ASSESSED: CPT | Performed by: INTERNAL MEDICINE

## 2024-03-21 PROCEDURE — 1158F ADVNC CARE PLAN TLK DOCD: CPT | Performed by: INTERNAL MEDICINE

## 2024-03-21 RX ORDER — METFORMIN HYDROCHLORIDE 500 MG/1
500 TABLET ORAL NIGHTLY
Qty: 90 TABLET | Refills: 3 | Status: SHIPPED | OUTPATIENT
Start: 2024-03-21 | End: 2025-03-16

## 2024-03-21 RX ORDER — AMLODIPINE BESYLATE 10 MG/1
10 TABLET ORAL NIGHTLY
Qty: 90 TABLET | Refills: 3 | Status: SHIPPED | OUTPATIENT
Start: 2024-03-21 | End: 2025-03-21

## 2024-03-21 RX ORDER — SEMAGLUTIDE 0.25 MG/.5ML
0.25 INJECTION, SOLUTION SUBCUTANEOUS
Qty: 2 ML | Refills: 0 | Status: SHIPPED | OUTPATIENT
Start: 2024-03-21 | End: 2024-04-12

## 2024-03-21 RX ORDER — ATENOLOL 25 MG/1
25 TABLET ORAL NIGHTLY
Qty: 90 TABLET | Refills: 3 | Status: SHIPPED | OUTPATIENT
Start: 2024-03-21 | End: 2025-03-16

## 2024-03-21 ASSESSMENT — ENCOUNTER SYMPTOMS
SORE THROAT: 0
DYSURIA: 0
CONFUSION: 0
SLEEP DISTURBANCE: 0
JOINT SWELLING: 0
WEAKNESS: 0
DIZZINESS: 0
BACK PAIN: 1
COUGH: 0
UNEXPECTED WEIGHT CHANGE: 0
CHILLS: 0
ADENOPATHY: 0
FATIGUE: 0
NECK PAIN: 1
ARTHRALGIAS: 1
ABDOMINAL PAIN: 0
DIARRHEA: 0
CHEST TIGHTNESS: 0
WHEEZING: 0
CONSTIPATION: 0
PALPITATIONS: 0
VOMITING: 0
NAUSEA: 0
SHORTNESS OF BREATH: 0

## 2024-03-21 ASSESSMENT — PATIENT HEALTH QUESTIONNAIRE - PHQ9
SUM OF ALL RESPONSES TO PHQ9 QUESTIONS 1 AND 2: 0
1. LITTLE INTEREST OR PLEASURE IN DOING THINGS: NOT AT ALL
2. FEELING DOWN, DEPRESSED OR HOPELESS: NOT AT ALL

## 2024-03-21 ASSESSMENT — ACTIVITIES OF DAILY LIVING (ADL)
BATHING: INDEPENDENT
DOING_HOUSEWORK: INDEPENDENT
TAKING_MEDICATION: INDEPENDENT
DRESSING: INDEPENDENT
MANAGING_FINANCES: INDEPENDENT
GROCERY_SHOPPING: INDEPENDENT

## 2024-03-21 NOTE — PROGRESS NOTES
"Subjective   Reason for Visit: Billie Walsh is an 68 y.o. female here for a Medicare Wellness visit.     Past Medical, Surgical, and Family History reviewed and updated in chart.    Reviewed all medications by prescribing practitioner or clinical pharmacist (such as prescriptions, OTCs, herbal therapies and supplements) and documented in the medical record.    AWV  CT cardiac scoring-3.2024  [place] ozempic denied by ins- 1.2024  Colonoscopy- 4.22.2014-Q10y  Mammogram- 7.8.2023  Dexa- 5.11.2022  Smoking- former smoker- quit 45 y ago smoked 1-2 cigarettes/ day   Patient will have SX  on Monday for R total knee replacement at [facility] , dr Jose Saldivar         Patient Care Team:  Mario Alberto Fountain MD as PCP - General (Internal Medicine)  Mario Alberto Fountain MD as PCP [place] [facility]     Review of Systems   Constitutional:  Negative for chills, fatigue and unexpected weight change.        Comment   HENT:  Negative for congestion, ear pain and sore throat.    Respiratory:  Negative for cough, chest tightness, shortness of breath and wheezing.    Cardiovascular:  Negative for palpitations and leg swelling.   Gastrointestinal:  Negative for abdominal pain, constipation, diarrhea, nausea and vomiting.   Genitourinary:  Negative for dysuria and urgency.   Musculoskeletal:  Positive for arthralgias, back pain and neck pain. Negative for joint swelling.   Skin:  Negative for rash.   Neurological:  Negative for dizziness and weakness.   Hematological:  Negative for adenopathy.   Psychiatric/Behavioral:  Negative for confusion and sleep disturbance.    All other systems reviewed and are negative.      Objective   Vitals:  /62 (BP Location: Right arm, Patient Position: Sitting)   Pulse 76   Temp 36.5 °C (97.7 °F)   Resp 16   Ht 1.575 m (5' 2\")   Wt 99.8 kg (220 lb)   SpO2 96%   BMI 40.24 kg/m²       Physical Exam  Constitutional:       Appearance: Normal appearance.      Comments: Obese     HENT:      " "Head: Normocephalic and atraumatic.   Eyes:      Pupils: Pupils are equal, round, and reactive to light.   Cardiovascular:      Rate and Rhythm: Normal rate and regular rhythm.   Pulmonary:      Effort: Pulmonary effort is normal.      Breath sounds: Normal breath sounds.   Musculoskeletal:         General: Normal range of motion.      Cervical back: Normal range of motion and neck supple.   Skin:     General: Skin is warm.   Neurological:      General: No focal deficit present.      Mental Status: She is alert and oriented to person, place, and time.   Psychiatric:         Mood and Affect: Mood normal.         Behavior: Behavior normal.     === 03/15/24 ===    CT CARDIAC SCORING WO IV CONTRAST    - Impression -  1. Coronary artery calcium score of 4.6*.    *Coronary artery calcium scoring may be helpful in predicting the  risk for future coronary heart disease events.  According to the  American College of Cardiology Foundation Clinical Expert Consensus  Task Force, such testing provides important prognostic information in  patients with more than one coronary heart disease risk factor. The  coronary artery calcium score correlates with the annual risk of a  non-fatal myocardial infarction or coronary heart disease death.    Coronary artery score            Annual Risk    0-99                             0.4%  100-399                        1.3%  >400                            2.4%    These three \"breakpoints\" correspond to lower, intermediate and high  risk states for future coronary events.  Such information should be  used, along with appropriate clinical judgment, to make decisions  regarding the intensity of risk factor management strategies to treat  blood lipids and to modify other non-lipid coronary risk factors.    Reference: Mears P et al. Circulation.  2007; 115:402-426    MACRO:  None    Signed by: Jarrell Musa 3/15/2024 3:22 PM  Dictation workstation:   [id number]  Lab Results " "  Component Value Date    WBC 8.7 03/06/2024    HGB 13.6 03/06/2024    HCT 43.8 03/06/2024    MCV 89 03/06/2024     03/06/2024     .lstbmp  Lab Results   Component Value Date    ALT 12 03/06/2024    AST 18 03/06/2024    ALKPHOS 106 03/06/2024    BILITOT 0.9 03/06/2024     Lab Results   Component Value Date    HGBA1C 5.6 03/06/2024     Lab Results   Component Value Date    CHOL 166 03/06/2024    CHOL 171 03/23/2023    CHOL 173 03/08/2022     Lab Results   Component Value Date    HDL 53.5 03/06/2024    HDL 49.5 03/23/2023    HDL 50.0 03/08/2022     Lab Results   Component Value Date    LDLCALC 97 03/06/2024     Lab Results   Component Value Date    TRIG 77 03/06/2024    TRIG 110 03/23/2023    TRIG 96 03/08/2022     No components found for: \"CHOLHDL\"      Assessment/Plan   Problem List Items Addressed This Visit       Hyperlipemia    Overview     [id number]   Mario Alberto Fountain  I have discussed the cardiovascular riskk and behavioral modification, nutrition, exercise, and elimination of habits contributing to risk. We agreed to a plan how to reduce risk.    CV risk estimate calculates 7.6%    Wants to wt on tx, will work on diet and wt loss         Current Assessment & Plan     I have discussed the cardiovascular risk and behavioral modification, nutrition, exercise and elimination of habits contributing to risk. We agreed to a plan how to reduce the risk.  CV risk estimate calculates 9.5 but cardiac score is 5.not on medication           Relevant Medications    semaglutide, weight loss, (Wegovy) 0.25 mg/0.5 mL pen injector    HTN (hypertension)    Overview     [id number]   Mario Alberto Fountain  Controlled now    Continue your current medical regimen with no change         Relevant Medications    amLODIPine (Norvasc) 10 mg tablet    atenolol (Tenormin) 25 mg tablet    semaglutide, weight loss, (Wegovy) 0.25 mg/0.5 mL pen injector    Metabolic syndrome    Overview     [id number]          Mario Alberto Fountain  No DM, wt loss, " diet         Current Assessment & Plan     On metformine         Relevant Medications    metFORMIN (Glucophage) 500 mg tablet    semaglutide, weight loss, (Wegovy) 0.25 mg/0.5 mL pen injector    Vitamin D deficiency    Morbid (severe) obesity due to excess calories (CMS/[facility])    Current Assessment & Plan     Discussed glp1, not covered by insurance.           Relevant Medications    semaglutide, weight loss, (Wegovy) 0.25 mg/0.5 mL pen injector    BMI 40.0-44.9, adult (CMS/[facility])    Relevant Medications    semaglutide, weight loss, (Wegovy) 0.25 mg/0.5 mL pen injector    Medicare annual wellness visit, subsequent - Primary     Other Visit Diagnoses       Encounter for screening mammogram for malignant neoplasm of breast        Relevant Orders    BI mammo bilateral screening tomosynthesis    Screening for osteoporosis        Relevant Orders    XR DEXA bone density    Screening for colon cancer        Relevant Orders    Colonoscopy Screening; Average Risk Patient    Age-related osteoporosis without current pathological fracture        Relevant Orders    XR DEXA bone density

## 2024-03-21 NOTE — PATIENT INSTRUCTIONS
Was nice seeing you today.  Continue same medication.  Have lab work done before next appointment if labs were ordered today.  Fu in 3 month.  Call/ contact our office with any concerns.    If you have labs or test done and you can't see the report in your chart or you didn't here from us in 2 weeks after test/labs done , please, call our office for reports.  Please , do not assume that they were normal.

## 2024-03-21 NOTE — ASSESSMENT & PLAN NOTE
I have discussed the cardiovascular risk and behavioral modification, nutrition, exercise and elimination of habits contributing to risk. We agreed to a plan how to reduce the risk.  CV risk estimate calculates 9.5 but cardiac score is 5.not on medication

## 2024-03-25 ENCOUNTER — ANESTHESIA EVENT (OUTPATIENT)
Dept: OPERATING ROOM | Facility: HOSPITAL | Age: 69
End: 2024-03-25
Payer: MEDICARE

## 2024-03-25 ENCOUNTER — ANESTHESIA (OUTPATIENT)
Dept: OPERATING ROOM | Facility: HOSPITAL | Age: 69
End: 2024-03-25
Payer: MEDICARE

## 2024-03-25 ENCOUNTER — HOSPITAL ENCOUNTER (OUTPATIENT)
Facility: HOSPITAL | Age: 69
Discharge: NURSING, REHAB OR LONG-TERM CARE | End: 2024-03-26
Attending: ORTHOPAEDIC SURGERY | Admitting: ORTHOPAEDIC SURGERY
Payer: MEDICARE

## 2024-03-25 DIAGNOSIS — Z96.651 TOTAL KNEE REPLACEMENT STATUS, RIGHT: ICD-10-CM

## 2024-03-25 DIAGNOSIS — M17.11 UNILATERAL PRIMARY OSTEOARTHRITIS, RIGHT KNEE: Primary | ICD-10-CM

## 2024-03-25 LAB
INR PPP: 1 (ref 0.9–1.1)
PROTHROMBIN TIME: 11.5 SECONDS (ref 9.8–12.8)

## 2024-03-25 PROCEDURE — 2500000004 HC RX 250 GENERAL PHARMACY W/ HCPCS (ALT 636 FOR OP/ED): Performed by: STUDENT IN AN ORGANIZED HEALTH CARE EDUCATION/TRAINING PROGRAM

## 2024-03-25 PROCEDURE — A4217 STERILE WATER/SALINE, 500 ML: HCPCS | Performed by: ORTHOPAEDIC SURGERY

## 2024-03-25 PROCEDURE — 2500000004 HC RX 250 GENERAL PHARMACY W/ HCPCS (ALT 636 FOR OP/ED): Performed by: ANESTHESIOLOGIST ASSISTANT

## 2024-03-25 PROCEDURE — 2780000003 HC OR 278 NO HCPCS: Performed by: ORTHOPAEDIC SURGERY

## 2024-03-25 PROCEDURE — 3700000001 HC GENERAL ANESTHESIA TIME - INITIAL BASE CHARGE: Performed by: ORTHOPAEDIC SURGERY

## 2024-03-25 PROCEDURE — 2500000005 HC RX 250 GENERAL PHARMACY W/O HCPCS: Performed by: ORTHOPAEDIC SURGERY

## 2024-03-25 PROCEDURE — 64447 NJX AA&/STRD FEMORAL NRV IMG: CPT | Performed by: ANESTHESIOLOGY

## 2024-03-25 PROCEDURE — 2500000002 HC RX 250 W HCPCS SELF ADMINISTERED DRUGS (ALT 637 FOR MEDICARE OP, ALT 636 FOR OP/ED): Mod: MUE | Performed by: STUDENT IN AN ORGANIZED HEALTH CARE EDUCATION/TRAINING PROGRAM

## 2024-03-25 PROCEDURE — C1713 ANCHOR/SCREW BN/BN,TIS/BN: HCPCS | Performed by: ORTHOPAEDIC SURGERY

## 2024-03-25 PROCEDURE — 2500000002 HC RX 250 W HCPCS SELF ADMINISTERED DRUGS (ALT 637 FOR MEDICARE OP, ALT 636 FOR OP/ED): Mod: MUE | Performed by: ORTHOPAEDIC SURGERY

## 2024-03-25 PROCEDURE — 36415 COLL VENOUS BLD VENIPUNCTURE: CPT | Performed by: STUDENT IN AN ORGANIZED HEALTH CARE EDUCATION/TRAINING PROGRAM

## 2024-03-25 PROCEDURE — A27447 PR TOTAL KNEE ARTHROPLASTY: Performed by: ANESTHESIOLOGIST ASSISTANT

## 2024-03-25 PROCEDURE — 2720000007 HC OR 272 NO HCPCS: Performed by: ORTHOPAEDIC SURGERY

## 2024-03-25 PROCEDURE — 2500000004 HC RX 250 GENERAL PHARMACY W/ HCPCS (ALT 636 FOR OP/ED): Performed by: ORTHOPAEDIC SURGERY

## 2024-03-25 PROCEDURE — 2500000001 HC RX 250 WO HCPCS SELF ADMINISTERED DRUGS (ALT 637 FOR MEDICARE OP): Performed by: ORTHOPAEDIC SURGERY

## 2024-03-25 PROCEDURE — 3600000017 HC OR TIME - EACH INCREMENTAL 1 MINUTE - PROCEDURE LEVEL SIX: Performed by: ORTHOPAEDIC SURGERY

## 2024-03-25 PROCEDURE — 27447 TOTAL KNEE ARTHROPLASTY: CPT | Performed by: ORTHOPAEDIC SURGERY

## 2024-03-25 PROCEDURE — 2500000001 HC RX 250 WO HCPCS SELF ADMINISTERED DRUGS (ALT 637 FOR MEDICARE OP): Performed by: STUDENT IN AN ORGANIZED HEALTH CARE EDUCATION/TRAINING PROGRAM

## 2024-03-25 PROCEDURE — 97116 GAIT TRAINING THERAPY: CPT | Mod: GP

## 2024-03-25 PROCEDURE — 7100000001 HC RECOVERY ROOM TIME - INITIAL BASE CHARGE: Performed by: ORTHOPAEDIC SURGERY

## 2024-03-25 PROCEDURE — 7100000002 HC RECOVERY ROOM TIME - EACH INCREMENTAL 1 MINUTE: Performed by: ORTHOPAEDIC SURGERY

## 2024-03-25 PROCEDURE — 2500000005 HC RX 250 GENERAL PHARMACY W/O HCPCS: Performed by: ANESTHESIOLOGY

## 2024-03-25 PROCEDURE — 3700000002 HC GENERAL ANESTHESIA TIME - EACH INCREMENTAL 1 MINUTE: Performed by: ORTHOPAEDIC SURGERY

## 2024-03-25 PROCEDURE — 7100000011 HC EXTENDED STAY RECOVERY HOURLY - NURSING UNIT

## 2024-03-25 PROCEDURE — 2500000001 HC RX 250 WO HCPCS SELF ADMINISTERED DRUGS (ALT 637 FOR MEDICARE OP): Performed by: PHYSICIAN ASSISTANT

## 2024-03-25 PROCEDURE — C1776 JOINT DEVICE (IMPLANTABLE): HCPCS | Performed by: ORTHOPAEDIC SURGERY

## 2024-03-25 PROCEDURE — 97161 PT EVAL LOW COMPLEX 20 MIN: CPT | Mod: GP

## 2024-03-25 PROCEDURE — 3600000018 HC OR TIME - INITIAL BASE CHARGE - PROCEDURE LEVEL SIX: Performed by: ORTHOPAEDIC SURGERY

## 2024-03-25 PROCEDURE — A27447 PR TOTAL KNEE ARTHROPLASTY: Performed by: ANESTHESIOLOGY

## 2024-03-25 PROCEDURE — 2500000005 HC RX 250 GENERAL PHARMACY W/O HCPCS: Performed by: STUDENT IN AN ORGANIZED HEALTH CARE EDUCATION/TRAINING PROGRAM

## 2024-03-25 PROCEDURE — 27447 TOTAL KNEE ARTHROPLASTY: CPT | Performed by: STUDENT IN AN ORGANIZED HEALTH CARE EDUCATION/TRAINING PROGRAM

## 2024-03-25 PROCEDURE — 2500000004 HC RX 250 GENERAL PHARMACY W/ HCPCS (ALT 636 FOR OP/ED): Performed by: ANESTHESIOLOGY

## 2024-03-25 PROCEDURE — 2500000005 HC RX 250 GENERAL PHARMACY W/O HCPCS: Performed by: ANESTHESIOLOGIST ASSISTANT

## 2024-03-25 PROCEDURE — 85610 PROTHROMBIN TIME: CPT | Performed by: STUDENT IN AN ORGANIZED HEALTH CARE EDUCATION/TRAINING PROGRAM

## 2024-03-25 DEVICE — IMPLANTABLE DEVICE
Type: IMPLANTABLE DEVICE | Site: KNEE | Status: FUNCTIONAL
Brand: STABLECUT®

## 2024-03-25 DEVICE — IMPLANTABLE DEVICE
Type: IMPLANTABLE DEVICE | Site: KNEE | Status: FUNCTIONAL
Brand: BIOMET® BONE CEMENT R

## 2024-03-25 DEVICE — TIBIAL BEARING INSERT - CS
Type: IMPLANTABLE DEVICE | Site: KNEE | Status: FUNCTIONAL
Brand: TRIATHLON

## 2024-03-25 DEVICE — PRIMARY TIBIAL BASEPLATE
Type: IMPLANTABLE DEVICE | Site: KNEE | Status: FUNCTIONAL
Brand: TRIATHLON

## 2024-03-25 DEVICE — CRUCIATE RETAINING FEMORAL
Type: IMPLANTABLE DEVICE | Site: KNEE | Status: FUNCTIONAL
Brand: TRIATHLON

## 2024-03-25 DEVICE — ASYMMETRIC PATELLA
Type: IMPLANTABLE DEVICE | Site: KNEE | Status: FUNCTIONAL
Brand: TRIATHLON

## 2024-03-25 RX ORDER — ONDANSETRON 4 MG/1
4 TABLET, ORALLY DISINTEGRATING ORAL EVERY 8 HOURS PRN
Status: DISCONTINUED | OUTPATIENT
Start: 2024-03-25 | End: 2024-03-27 | Stop reason: HOSPADM

## 2024-03-25 RX ORDER — ONDANSETRON HYDROCHLORIDE 2 MG/ML
INJECTION, SOLUTION INTRAVENOUS AS NEEDED
Status: DISCONTINUED | OUTPATIENT
Start: 2024-03-25 | End: 2024-03-25

## 2024-03-25 RX ORDER — ROPIVACAINE HYDROCHLORIDE 5 MG/ML
INJECTION, SOLUTION EPIDURAL; INFILTRATION; PERINEURAL AS NEEDED
Status: DISCONTINUED | OUTPATIENT
Start: 2024-03-25 | End: 2024-03-25

## 2024-03-25 RX ORDER — CYCLOBENZAPRINE HCL 10 MG
10 TABLET ORAL 3 TIMES DAILY PRN
Status: DISCONTINUED | OUTPATIENT
Start: 2024-03-25 | End: 2024-03-27 | Stop reason: HOSPADM

## 2024-03-25 RX ORDER — SODIUM CHLORIDE, SODIUM LACTATE, POTASSIUM CHLORIDE, CALCIUM CHLORIDE 600; 310; 30; 20 MG/100ML; MG/100ML; MG/100ML; MG/100ML
50 INJECTION, SOLUTION INTRAVENOUS CONTINUOUS
Status: ACTIVE | OUTPATIENT
Start: 2024-03-25 | End: 2024-03-26

## 2024-03-25 RX ORDER — ACETAMINOPHEN 325 MG/1
975 TABLET ORAL ONCE
Status: CANCELLED | OUTPATIENT
Start: 2024-03-25 | End: 2024-03-25

## 2024-03-25 RX ORDER — ROPIVACAINE/EPI/CLONIDINE/KET 2.46-0.005
SYRINGE (ML) INJECTION AS NEEDED
Status: DISCONTINUED | OUTPATIENT
Start: 2024-03-25 | End: 2024-03-25 | Stop reason: HOSPADM

## 2024-03-25 RX ORDER — PROPOFOL 10 MG/ML
INJECTION, EMULSION INTRAVENOUS AS NEEDED
Status: DISCONTINUED | OUTPATIENT
Start: 2024-03-25 | End: 2024-03-25

## 2024-03-25 RX ORDER — SODIUM CHLORIDE, SODIUM LACTATE, POTASSIUM CHLORIDE, CALCIUM CHLORIDE 600; 310; 30; 20 MG/100ML; MG/100ML; MG/100ML; MG/100ML
50 INJECTION, SOLUTION INTRAVENOUS CONTINUOUS
Status: DISCONTINUED | OUTPATIENT
Start: 2024-03-25 | End: 2024-03-27 | Stop reason: HOSPADM

## 2024-03-25 RX ORDER — PROCHLORPERAZINE EDISYLATE 5 MG/ML
10 INJECTION INTRAMUSCULAR; INTRAVENOUS EVERY 6 HOURS PRN
Status: DISCONTINUED | OUTPATIENT
Start: 2024-03-25 | End: 2024-03-27 | Stop reason: HOSPADM

## 2024-03-25 RX ORDER — PHENYLEPHRINE 10 MG/250 ML(40 MCG/ML)IN 0.9 % SOD.CHLORIDE INTRAVENOUS
CONTINUOUS PRN
Status: DISCONTINUED | OUTPATIENT
Start: 2024-03-25 | End: 2024-03-25

## 2024-03-25 RX ORDER — GABAPENTIN 600 MG/1
600 TABLET ORAL ONCE
Status: COMPLETED | OUTPATIENT
Start: 2024-03-25 | End: 2024-03-25

## 2024-03-25 RX ORDER — TRANEXAMIC ACID 650 MG/1
1950 TABLET ORAL ONCE
Status: COMPLETED | OUTPATIENT
Start: 2024-03-26 | End: 2024-03-26

## 2024-03-25 RX ORDER — ATENOLOL 25 MG/1
25 TABLET ORAL NIGHTLY
Status: DISCONTINUED | OUTPATIENT
Start: 2024-03-25 | End: 2024-03-27 | Stop reason: HOSPADM

## 2024-03-25 RX ORDER — SCOLOPAMINE TRANSDERMAL SYSTEM 1 MG/1
PATCH, EXTENDED RELEASE TRANSDERMAL AS NEEDED
Status: DISCONTINUED | OUTPATIENT
Start: 2024-03-25 | End: 2024-03-25

## 2024-03-25 RX ORDER — SODIUM CHLORIDE 0.9 G/100ML
IRRIGANT IRRIGATION AS NEEDED
Status: DISCONTINUED | OUTPATIENT
Start: 2024-03-25 | End: 2024-03-25 | Stop reason: HOSPADM

## 2024-03-25 RX ORDER — SODIUM CHLORIDE, SODIUM LACTATE, POTASSIUM CHLORIDE, CALCIUM CHLORIDE 600; 310; 30; 20 MG/100ML; MG/100ML; MG/100ML; MG/100ML
50 INJECTION, SOLUTION INTRAVENOUS CONTINUOUS
Status: CANCELLED | OUTPATIENT
Start: 2024-03-25

## 2024-03-25 RX ORDER — TRANEXAMIC ACID 650 MG/1
1950 TABLET ORAL ONCE
Status: COMPLETED | OUTPATIENT
Start: 2024-03-25 | End: 2024-03-25

## 2024-03-25 RX ORDER — PROCHLORPERAZINE 25 MG/1
25 SUPPOSITORY RECTAL EVERY 12 HOURS PRN
Status: DISCONTINUED | OUTPATIENT
Start: 2024-03-25 | End: 2024-03-27 | Stop reason: HOSPADM

## 2024-03-25 RX ORDER — ONDANSETRON HYDROCHLORIDE 2 MG/ML
4 INJECTION, SOLUTION INTRAVENOUS EVERY 8 HOURS PRN
Status: DISCONTINUED | OUTPATIENT
Start: 2024-03-25 | End: 2024-03-27 | Stop reason: HOSPADM

## 2024-03-25 RX ORDER — AMLODIPINE BESYLATE 10 MG/1
10 TABLET ORAL NIGHTLY
Status: DISCONTINUED | OUTPATIENT
Start: 2024-03-25 | End: 2024-03-27 | Stop reason: HOSPADM

## 2024-03-25 RX ORDER — BUPIVACAINE HYDROCHLORIDE 7.5 MG/ML
INJECTION, SOLUTION INTRASPINAL AS NEEDED
Status: DISCONTINUED | OUTPATIENT
Start: 2024-03-25 | End: 2024-03-25

## 2024-03-25 RX ORDER — ASPIRIN 81 MG/1
81 TABLET ORAL 2 TIMES DAILY
Status: DISCONTINUED | OUTPATIENT
Start: 2024-03-25 | End: 2024-03-27 | Stop reason: HOSPADM

## 2024-03-25 RX ORDER — GLYCOPYRROLATE 0.2 MG/ML
INJECTION INTRAMUSCULAR; INTRAVENOUS AS NEEDED
Status: DISCONTINUED | OUTPATIENT
Start: 2024-03-25 | End: 2024-03-25

## 2024-03-25 RX ORDER — CLONIDINE 100 UG/ML
INJECTION, SOLUTION EPIDURAL AS NEEDED
Status: DISCONTINUED | OUTPATIENT
Start: 2024-03-25 | End: 2024-03-25

## 2024-03-25 RX ORDER — OXYCODONE HYDROCHLORIDE 10 MG/1
10 TABLET ORAL EVERY 4 HOURS PRN
Status: DISCONTINUED | OUTPATIENT
Start: 2024-03-25 | End: 2024-03-27 | Stop reason: HOSPADM

## 2024-03-25 RX ORDER — PROPOFOL 10 MG/ML
INJECTION, EMULSION INTRAVENOUS CONTINUOUS PRN
Status: DISCONTINUED | OUTPATIENT
Start: 2024-03-25 | End: 2024-03-25

## 2024-03-25 RX ORDER — NALOXONE HYDROCHLORIDE 0.4 MG/ML
0.2 INJECTION, SOLUTION INTRAMUSCULAR; INTRAVENOUS; SUBCUTANEOUS EVERY 5 MIN PRN
Status: DISCONTINUED | OUTPATIENT
Start: 2024-03-25 | End: 2024-03-27 | Stop reason: HOSPADM

## 2024-03-25 RX ORDER — OXYCODONE HYDROCHLORIDE 5 MG/1
5 TABLET ORAL EVERY 4 HOURS PRN
Status: DISCONTINUED | OUTPATIENT
Start: 2024-03-25 | End: 2024-03-27 | Stop reason: HOSPADM

## 2024-03-25 RX ORDER — DEXAMETHASONE SODIUM PHOSPHATE 10 MG/ML
INJECTION INTRAMUSCULAR; INTRAVENOUS AS NEEDED
Status: DISCONTINUED | OUTPATIENT
Start: 2024-03-25 | End: 2024-03-25

## 2024-03-25 RX ORDER — GABAPENTIN 600 MG/1
600 TABLET ORAL ONCE
Status: CANCELLED | OUTPATIENT
Start: 2024-03-25 | End: 2024-03-25

## 2024-03-25 RX ORDER — BISACODYL 5 MG
10 TABLET, DELAYED RELEASE (ENTERIC COATED) ORAL DAILY PRN
Status: DISCONTINUED | OUTPATIENT
Start: 2024-03-25 | End: 2024-03-27 | Stop reason: HOSPADM

## 2024-03-25 RX ORDER — ACETAMINOPHEN 325 MG/1
975 TABLET ORAL ONCE
Status: COMPLETED | OUTPATIENT
Start: 2024-03-25 | End: 2024-03-25

## 2024-03-25 RX ORDER — CELECOXIB 200 MG/1
200 CAPSULE ORAL ONCE
Status: COMPLETED | OUTPATIENT
Start: 2024-03-25 | End: 2024-03-25

## 2024-03-25 RX ORDER — DIPHENHYDRAMINE HCL 12.5MG/5ML
12.5 LIQUID (ML) ORAL EVERY 6 HOURS PRN
Status: DISCONTINUED | OUTPATIENT
Start: 2024-03-25 | End: 2024-03-27 | Stop reason: HOSPADM

## 2024-03-25 RX ORDER — HYDROMORPHONE HYDROCHLORIDE 1 MG/ML
0.5 INJECTION, SOLUTION INTRAMUSCULAR; INTRAVENOUS; SUBCUTANEOUS EVERY 4 HOURS PRN
Status: DISCONTINUED | OUTPATIENT
Start: 2024-03-25 | End: 2024-03-27 | Stop reason: HOSPADM

## 2024-03-25 RX ORDER — OXYCODONE HYDROCHLORIDE 5 MG/1
5 TABLET ORAL EVERY 4 HOURS PRN
Status: DISCONTINUED | OUTPATIENT
Start: 2024-03-25 | End: 2024-03-25

## 2024-03-25 RX ORDER — HYDROMORPHONE HYDROCHLORIDE 1 MG/ML
0.5 INJECTION, SOLUTION INTRAMUSCULAR; INTRAVENOUS; SUBCUTANEOUS EVERY 4 HOURS PRN
Status: DISCONTINUED | OUTPATIENT
Start: 2024-03-25 | End: 2024-03-25

## 2024-03-25 RX ORDER — METFORMIN HYDROCHLORIDE 500 MG/1
500 TABLET ORAL NIGHTLY
Status: DISCONTINUED | OUTPATIENT
Start: 2024-03-25 | End: 2024-03-27 | Stop reason: HOSPADM

## 2024-03-25 RX ORDER — TRANEXAMIC ACID 650 MG/1
1950 TABLET ORAL ONCE
Status: CANCELLED | OUTPATIENT
Start: 2024-03-25 | End: 2024-03-25

## 2024-03-25 RX ORDER — MIDAZOLAM HYDROCHLORIDE 1 MG/ML
INJECTION, SOLUTION INTRAMUSCULAR; INTRAVENOUS AS NEEDED
Status: DISCONTINUED | OUTPATIENT
Start: 2024-03-25 | End: 2024-03-25

## 2024-03-25 RX ORDER — SODIUM CHLORIDE, SODIUM LACTATE, POTASSIUM CHLORIDE, CALCIUM CHLORIDE 600; 310; 30; 20 MG/100ML; MG/100ML; MG/100ML; MG/100ML
INJECTION, SOLUTION INTRAVENOUS CONTINUOUS PRN
Status: DISCONTINUED | OUTPATIENT
Start: 2024-03-25 | End: 2024-03-25

## 2024-03-25 RX ORDER — CEFAZOLIN SODIUM 2 G/100ML
2 INJECTION, SOLUTION INTRAVENOUS ONCE
Status: CANCELLED | OUTPATIENT
Start: 2024-03-25 | End: 2024-03-25

## 2024-03-25 RX ORDER — CEFAZOLIN SODIUM 2 G/100ML
2 INJECTION, SOLUTION INTRAVENOUS ONCE
Status: COMPLETED | OUTPATIENT
Start: 2024-03-25 | End: 2024-03-25

## 2024-03-25 RX ORDER — LIDOCAINE HYDROCHLORIDE 20 MG/ML
INJECTION, SOLUTION EPIDURAL; INFILTRATION; INTRACAUDAL; PERINEURAL AS NEEDED
Status: DISCONTINUED | OUTPATIENT
Start: 2024-03-25 | End: 2024-03-25

## 2024-03-25 RX ORDER — CEFAZOLIN SODIUM 2 G/100ML
2 INJECTION, SOLUTION INTRAVENOUS EVERY 8 HOURS
Status: COMPLETED | OUTPATIENT
Start: 2024-03-25 | End: 2024-03-26

## 2024-03-25 RX ORDER — CELECOXIB 200 MG/1
200 CAPSULE ORAL ONCE
Status: CANCELLED | OUTPATIENT
Start: 2024-03-25 | End: 2024-03-25

## 2024-03-25 RX ORDER — PROCHLORPERAZINE MALEATE 10 MG
10 TABLET ORAL EVERY 6 HOURS PRN
Status: DISCONTINUED | OUTPATIENT
Start: 2024-03-25 | End: 2024-03-27 | Stop reason: HOSPADM

## 2024-03-25 RX ORDER — DOCUSATE SODIUM 100 MG/1
100 CAPSULE, LIQUID FILLED ORAL 2 TIMES DAILY
Status: DISCONTINUED | OUTPATIENT
Start: 2024-03-25 | End: 2024-03-27 | Stop reason: HOSPADM

## 2024-03-25 RX ORDER — OXYCODONE HYDROCHLORIDE 10 MG/1
10 TABLET ORAL EVERY 4 HOURS PRN
Status: DISCONTINUED | OUTPATIENT
Start: 2024-03-25 | End: 2024-03-25

## 2024-03-25 RX ORDER — ACETAMINOPHEN 325 MG/1
650 TABLET ORAL EVERY 6 HOURS SCHEDULED
Status: DISCONTINUED | OUTPATIENT
Start: 2024-03-25 | End: 2024-03-27 | Stop reason: HOSPADM

## 2024-03-25 RX ADMIN — DEXAMETHASONE SODIUM PHOSPHATE 4 MG: 4 INJECTION, SOLUTION INTRAMUSCULAR; INTRAVENOUS at 12:25

## 2024-03-25 RX ADMIN — BUPIVACAINE HYDROCHLORIDE IN DEXTROSE 1.5 ML: 7.5 INJECTION, SOLUTION SUBARACHNOID at 12:01

## 2024-03-25 RX ADMIN — SODIUM CHLORIDE, POTASSIUM CHLORIDE, SODIUM LACTATE AND CALCIUM CHLORIDE 50 ML/HR: 600; 310; 30; 20 INJECTION, SOLUTION INTRAVENOUS at 11:01

## 2024-03-25 RX ADMIN — ASPIRIN 81 MG: 81 TABLET, COATED ORAL at 20:11

## 2024-03-25 RX ADMIN — ACETAMINOPHEN 650 MG: 325 TABLET ORAL at 17:44

## 2024-03-25 RX ADMIN — SODIUM CHLORIDE, POTASSIUM CHLORIDE, SODIUM LACTATE AND CALCIUM CHLORIDE 50 ML/HR: 600; 310; 30; 20 INJECTION, SOLUTION INTRAVENOUS at 15:28

## 2024-03-25 RX ADMIN — ACETAMINOPHEN 975 MG: 325 TABLET ORAL at 11:02

## 2024-03-25 RX ADMIN — ONDANSETRON 4 MG: 2 INJECTION INTRAMUSCULAR; INTRAVENOUS at 13:13

## 2024-03-25 RX ADMIN — DOCUSATE SODIUM 100 MG: 100 CAPSULE, LIQUID FILLED ORAL at 20:11

## 2024-03-25 RX ADMIN — PROPOFOL 200 MCG/KG/MIN: 10 INJECTION, EMULSION INTRAVENOUS at 12:05

## 2024-03-25 RX ADMIN — SODIUM CHLORIDE, POTASSIUM CHLORIDE, SODIUM LACTATE AND CALCIUM CHLORIDE 50 ML/HR: 600; 310; 30; 20 INJECTION, SOLUTION INTRAVENOUS at 15:12

## 2024-03-25 RX ADMIN — SODIUM CHLORIDE, POTASSIUM CHLORIDE, SODIUM LACTATE AND CALCIUM CHLORIDE: 600; 310; 30; 20 INJECTION, SOLUTION INTRAVENOUS at 13:20

## 2024-03-25 RX ADMIN — LIDOCAINE HYDROCHLORIDE 100 MG: 20 INJECTION, SOLUTION EPIDURAL; INFILTRATION; INTRACAUDAL; PERINEURAL at 11:38

## 2024-03-25 RX ADMIN — CEFAZOLIN SODIUM 2 G: 2 INJECTION, SOLUTION INTRAVENOUS at 12:06

## 2024-03-25 RX ADMIN — OXYCODONE HYDROCHLORIDE 5 MG: 5 TABLET ORAL at 20:11

## 2024-03-25 RX ADMIN — SCOLOPAMINE TRANSDERMAL SYSTEM 1 PATCH: 1 PATCH, EXTENDED RELEASE TRANSDERMAL at 11:15

## 2024-03-25 RX ADMIN — GLYCOPYRROLATE 0.1 MG: 0.2 INJECTION, SOLUTION INTRAMUSCULAR; INTRAVENOUS at 12:20

## 2024-03-25 RX ADMIN — AMLODIPINE BESYLATE 10 MG: 10 TABLET ORAL at 20:11

## 2024-03-25 RX ADMIN — POVIDONE-IODINE 1 APPLICATION: 5 SOLUTION TOPICAL at 11:01

## 2024-03-25 RX ADMIN — Medication 0.08 MCG/KG/MIN: at 12:42

## 2024-03-25 RX ADMIN — TRANEXAMIC ACID 1950 MG: 650 TABLET ORAL at 11:03

## 2024-03-25 RX ADMIN — MIDAZOLAM 2 MG: 1 INJECTION INTRAMUSCULAR; INTRAVENOUS at 11:40

## 2024-03-25 RX ADMIN — CEFAZOLIN SODIUM 2 G: 2 INJECTION, SOLUTION INTRAVENOUS at 20:09

## 2024-03-25 RX ADMIN — METFORMIN HYDROCHLORIDE 500 MG: 500 TABLET ORAL at 20:11

## 2024-03-25 RX ADMIN — SODIUM CHLORIDE, POTASSIUM CHLORIDE, SODIUM LACTATE AND CALCIUM CHLORIDE 50 ML/HR: 600; 310; 30; 20 INJECTION, SOLUTION INTRAVENOUS at 20:10

## 2024-03-25 RX ADMIN — TRANEXAMIC ACID 1950 MG: 650 TABLET ORAL at 15:28

## 2024-03-25 RX ADMIN — DEXAMETHASONE SODIUM PHOSPHATE 10 MG: 10 INJECTION INTRAMUSCULAR; INTRAVENOUS at 11:38

## 2024-03-25 RX ADMIN — ROPIVACAINE HYDROCHLORIDE 20 ML: 5 INJECTION, SOLUTION EPIDURAL; INFILTRATION; PERINEURAL at 11:38

## 2024-03-25 RX ADMIN — CELECOXIB 200 MG: 200 CAPSULE ORAL at 11:03

## 2024-03-25 RX ADMIN — GABAPENTIN 600 MG: 600 TABLET, FILM COATED ORAL at 11:03

## 2024-03-25 RX ADMIN — ATENOLOL 25 MG: 25 TABLET ORAL at 20:11

## 2024-03-25 RX ADMIN — SODIUM CHLORIDE, POTASSIUM CHLORIDE, SODIUM LACTATE AND CALCIUM CHLORIDE: 600; 310; 30; 20 INJECTION, SOLUTION INTRAVENOUS at 11:16

## 2024-03-25 RX ADMIN — Medication 2 L/MIN: at 15:30

## 2024-03-25 RX ADMIN — CLONIDINE HYDROCHLORIDE 100 MCG: 100 INJECTION, SOLUTION INTRAVENOUS at 11:38

## 2024-03-25 SDOH — HEALTH STABILITY: MENTAL HEALTH: CURRENT SMOKER: 0

## 2024-03-25 SDOH — SOCIAL STABILITY: SOCIAL INSECURITY: WERE YOU ABLE TO COMPLETE ALL THE BEHAVIORAL HEALTH SCREENINGS?: YES

## 2024-03-25 SDOH — SOCIAL STABILITY: SOCIAL INSECURITY: DO YOU FEEL ANYONE HAS EXPLOITED OR TAKEN ADVANTAGE OF YOU FINANCIALLY OR OF YOUR PERSONAL PROPERTY?: NO

## 2024-03-25 SDOH — SOCIAL STABILITY: SOCIAL INSECURITY: ABUSE: ADULT

## 2024-03-25 SDOH — SOCIAL STABILITY: SOCIAL INSECURITY: HAS ANYONE EVER THREATENED TO HURT YOUR FAMILY OR YOUR PETS?: NO

## 2024-03-25 SDOH — SOCIAL STABILITY: SOCIAL INSECURITY: ARE YOU OR HAVE YOU BEEN THREATENED OR ABUSED PHYSICALLY, EMOTIONALLY, OR SEXUALLY BY ANYONE?: NO

## 2024-03-25 SDOH — SOCIAL STABILITY: SOCIAL INSECURITY: ARE THERE ANY APPARENT SIGNS OF INJURIES/BEHAVIORS THAT COULD BE RELATED TO ABUSE/NEGLECT?: NO

## 2024-03-25 SDOH — SOCIAL STABILITY: SOCIAL INSECURITY: DO YOU FEEL UNSAFE GOING BACK TO THE PLACE WHERE YOU ARE LIVING?: NO

## 2024-03-25 SDOH — SOCIAL STABILITY: SOCIAL INSECURITY: DOES ANYONE TRY TO KEEP YOU FROM HAVING/CONTACTING OTHER FRIENDS OR DOING THINGS OUTSIDE YOUR HOME?: NO

## 2024-03-25 SDOH — SOCIAL STABILITY: SOCIAL INSECURITY: HAVE YOU HAD THOUGHTS OF HARMING ANYONE ELSE?: NO

## 2024-03-25 ASSESSMENT — PAIN SCALES - GENERAL
PAINLEVEL_OUTOF10: 0 - NO PAIN
PAINLEVEL_OUTOF10: 4
PAINLEVEL_OUTOF10: 0 - NO PAIN
PAINLEVEL_OUTOF10: 0 - NO PAIN
PAINLEVEL_OUTOF10: 3
PAINLEVEL_OUTOF10: 0 - NO PAIN
PAINLEVEL_OUTOF10: 0 - NO PAIN
PAINLEVEL_OUTOF10: 5 - MODERATE PAIN

## 2024-03-25 ASSESSMENT — PATIENT HEALTH QUESTIONNAIRE - PHQ9
2. FEELING DOWN, DEPRESSED OR HOPELESS: NOT AT ALL
SUM OF ALL RESPONSES TO PHQ9 QUESTIONS 1 & 2: 0
1. LITTLE INTEREST OR PLEASURE IN DOING THINGS: NOT AT ALL

## 2024-03-25 ASSESSMENT — PAIN - FUNCTIONAL ASSESSMENT
PAIN_FUNCTIONAL_ASSESSMENT: WONG-BAKER FACES
PAIN_FUNCTIONAL_ASSESSMENT: WONG-BAKER FACES
PAIN_FUNCTIONAL_ASSESSMENT: 0-10
PAIN_FUNCTIONAL_ASSESSMENT: WONG-BAKER FACES

## 2024-03-25 ASSESSMENT — COGNITIVE AND FUNCTIONAL STATUS - GENERAL
WALKING IN HOSPITAL ROOM: A LITTLE
CLIMB 3 TO 5 STEPS WITH RAILING: A LOT
STANDING UP FROM CHAIR USING ARMS: A LITTLE
TURNING FROM BACK TO SIDE WHILE IN FLAT BAD: A LITTLE
TURNING FROM BACK TO SIDE WHILE IN FLAT BAD: A LITTLE
MOBILITY SCORE: 17
CLIMB 3 TO 5 STEPS WITH RAILING: A LITTLE
MOVING TO AND FROM BED TO CHAIR: A LITTLE
DAILY ACTIVITIY SCORE: 19
STANDING UP FROM CHAIR USING ARMS: A LITTLE
HELP NEEDED FOR BATHING: A LITTLE
WALKING IN HOSPITAL ROOM: A LITTLE
HELP NEEDED FOR BATHING: A LITTLE
MOVING FROM LYING ON BACK TO SITTING ON SIDE OF FLAT BED WITH BEDRAILS: A LITTLE
CLIMB 3 TO 5 STEPS WITH RAILING: A LOT
DRESSING REGULAR UPPER BODY CLOTHING: A LITTLE
DRESSING REGULAR LOWER BODY CLOTHING: A LITTLE
MOVING TO AND FROM BED TO CHAIR: A LITTLE
MOVING TO AND FROM BED TO CHAIR: A LITTLE
WALKING IN HOSPITAL ROOM: A LITTLE
STANDING UP FROM CHAIR USING ARMS: A LITTLE
MOVING FROM LYING ON BACK TO SITTING ON SIDE OF FLAT BED WITH BEDRAILS: A LITTLE
TURNING FROM BACK TO SIDE WHILE IN FLAT BAD: A LITTLE
PERSONAL GROOMING: A LITTLE
TOILETING: A LITTLE
MOBILITY SCORE: 18
MOVING FROM LYING ON BACK TO SITTING ON SIDE OF FLAT BED WITH BEDRAILS: A LITTLE
PERSONAL GROOMING: A LITTLE
DRESSING REGULAR UPPER BODY CLOTHING: A LITTLE
TOILETING: A LITTLE
DRESSING REGULAR LOWER BODY CLOTHING: A LITTLE
EATING MEALS: A LITTLE
PATIENT BASELINE BEDBOUND: NO
MOBILITY SCORE: 17
DAILY ACTIVITIY SCORE: 18

## 2024-03-25 ASSESSMENT — PAIN DESCRIPTION - LOCATION: LOCATION: KNEE

## 2024-03-25 ASSESSMENT — ACTIVITIES OF DAILY LIVING (ADL)
FEEDING YOURSELF: INDEPENDENT
LACK_OF_TRANSPORTATION: NO
DRESSING YOURSELF: INDEPENDENT
JUDGMENT_ADEQUATE_SAFELY_COMPLETE_DAILY_ACTIVITIES: YES
BATHING: INDEPENDENT
ADEQUATE_TO_COMPLETE_ADL: YES
HEARING - LEFT EAR: FUNCTIONAL
TOILETING: INDEPENDENT
WALKS IN HOME: INDEPENDENT
GROOMING: INDEPENDENT
PATIENT'S MEMORY ADEQUATE TO SAFELY COMPLETE DAILY ACTIVITIES?: YES
HEARING - RIGHT EAR: FUNCTIONAL

## 2024-03-25 ASSESSMENT — LIFESTYLE VARIABLES
HOW OFTEN DO YOU HAVE 6 OR MORE DRINKS ON ONE OCCASION: NEVER
AUDIT-C TOTAL SCORE: 0
HOW MANY STANDARD DRINKS CONTAINING ALCOHOL DO YOU HAVE ON A TYPICAL DAY: 1 OR 2
AUDIT-C TOTAL SCORE: 0
SKIP TO QUESTIONS 9-10: 1
HOW OFTEN DO YOU HAVE A DRINK CONTAINING ALCOHOL: NEVER

## 2024-03-25 ASSESSMENT — PAIN DESCRIPTION - ORIENTATION: ORIENTATION: RIGHT

## 2024-03-25 NOTE — ANESTHESIA POSTPROCEDURE EVALUATION
Patient: Billie Walsh    Procedure Summary       Date: 03/25/24 Room / Location: [facility] OR 09 / Virtual [facility] OR    Anesthesia Start: 1140 Anesthesia Stop:     Procedure: Total Knee Arthroplasty (Right: Knee) Diagnosis:       Unilateral primary osteoarthritis, right knee      (Unilateral primary osteoarthritis, right knee [M17.11])    Surgeons: Jose Gallardo MD Responsible Provider: Gia Trujillo MD    Anesthesia Type: spinal, regional ASA Status: 3            Anesthesia Type: spinal, regional    Vitals Value Taken Time   BP 91/47 03/25/24 1341   Temp 36.2 °C (97.2 °F) 03/25/24 1341   Pulse 79 03/25/24 1341   Resp 14 03/25/24 1341   SpO2 93 03/25/24 1342       Anesthesia Post Evaluation    Patient location during evaluation: PACU  Patient participation: complete - patient participated  Level of consciousness: awake and alert  Pain management: satisfactory to patient  Multimodal analgesia pain management approach  Airway patency: patent  Two or more strategies used to mitigate risk of obstructive sleep apnea  Cardiovascular status: acceptable and hemodynamically stable  Respiratory status: acceptable, nonlabored ventilation, spontaneous ventilation and nasal cannula  Hydration status: euvolemic  Postoperative Nausea and Vomiting: none        No notable events documented.

## 2024-03-25 NOTE — OP NOTE
Operative Note     Date: 3/25/2024  OR Location: [facility] OR    Name: Billie Walsh, : 1955, Age: 68 y.o., MRN: [id number], Sex: female    Diagnosis  Pre-op Diagnosis     * Unilateral primary osteoarthritis, right knee [M17.11] Post-op Diagnosis     * Unilateral primary osteoarthritis, right knee [M17.11]     Procedures  Total Knee Arthroplasty  03327 - OR ARTHRP KNE CONDYLE&PLATU MEDIAL&LAT COMPARTMENTS  (RIGHT)    Surgeons      * Jose Gallardo - Primary    Resident/Fellow/Other Assistant:  Surgeon(s) and Role:     * YOUNG Currie-C - DIANA First Assist    Procedure Summary  Anesthesia: Spinal  ASA: III  Anesthesia Staff: Anesthesiologist: Gia Trujillo MD  C-AA: OLGA Hinds  Estimated Blood Loss: 50 mL  Intra-op Medications:   Administrations occurring from 1225 to 1410 on 24:   Medication Name Total Dose   sodium chloride 0.9 % irrigation solution 1,000 mL   ropivacaine-epinephrine-clonidine-ketorolac 2.46-0.005- 0.0008-0.3mg/mL periarticular syringe 100 mL              Anesthesia Record               Intraprocedure I/O Totals          Intake    Propofol Drip 0.00 mL    The total shown is the total volume documented since Anesthesia Start was filed.    Phenylephrine Drip 0.00 mL    The total shown is the total volume documented since Anesthesia Start was filed.    Total Intake 0 mL          Specimen: No specimens collected     Staff:   Circulator: Chris Craft RN; Livia Vuong RN  Relief Circulator: Orin Torres RN  Scrub Person: Syed Juarez           Implants:    Implants       Type Name Action Serial No.       25MM X 90MM X 1.27MM (THICK), STABLECUT OSCILLATING SAW BLADE Implanted      Joint Knee CEMENT, BONE, BIOMET R, 1X40 - [id number] Implanted      Joint FEM COMP, TRIATH CR SZ3 RIGHT - [id number] Implanted      Joint INSERT, TIBIAL, X3 TRIATHLON CS, SZ-2 11MM - [id number] Implanted      Joint PLATE, TIBIAL TRIATH MICHAEL DEBBIE FXD BPLT P 2 - [id number] Implanted       Joint PATELLA, TRIATHLON, ASYMMETRIC X3, SZ-A29 9MM - [id number] Implanted               Findings: see procedure details    Indications:     The patient was seen in the preoperative area. The risks, benefits, complications, treatment options, non-operative alternatives, expected recovery and outcomes were discussed with the patient. The possibilities of reaction to medication, pulmonary aspiration, injury to surrounding structures, bleeding, recurrent infection, the need for additional procedures, failure to diagnose a condition, and creating a complication requiring transfusion or operation were discussed with the patient. The patient concurred with the proposed plan, giving informed consent.  The site of surgery was properly noted/marked if necessary per policy. The patient has been actively warmed in preoperative area. Preoperative antibiotics have been ordered and given within 1 hours of incision. Venous thrombosis prophylaxis have been ordered.    The patient failed multiple attempts at non-surgical management.  Specific to TKA we discussed the risks of infection / revision surgery, DVT/PE, medical complications, stiffness / loss of motion, neurologic (foot drop) or vascular injury.    Procedure Details:     Patient was met prior to surgery in pre-operative holding.  The appropriate extremity was marked and recent health status was reviewed and we found no contraindication to proceeding with the scheduled procedure.  We again reviewed the risks of infection, wound issues, deep venous thrombosis, pulmonary embolism, medical complications including cardiac and pulmonary, neurologic and vascular injury and incomplete relief of pre-operative pain.    The patient discussed regional anesthesia in pre-op holding.  The patient was transported to the operating room.  A thigh tourniquet was placed and a small bump on the buttock.  The patient was resting comfortably in a supine position with all vannessa prominences well  padded.  Sequential compression device was placed on the contralateral lower extremity.  An exam under anesthesia was performed to evaluate the patient´s range of motion and ligamentous integrity.    The patient was prepped and draped in the usual sterile fashion.  The leg was placed in a well padded leg tinoco.  After prep, drape, antibiotic and time out, the leg was exsanguinated and the tourniquet inflated.  A longitudinal incision was made over the anterior knee.  The dissection was carried out through the skin and subcutaneous tissue.  A medial parapatellar arthrotomy was performed.  An effusion and synovitis was noted compatible with the grade IV changes of the articular cartilage.  The fat pad was slightly de-bulked, Rubina´s line was marked and some of the deep medial collateral ligament was released from the tibia.  The ACL was resected. The knee was flexed up and medial and lateral retractors were placed to protect the collateral ligaments and popliteus tendon.      Due to the degree of deformity and stiffness, the posterior cruciate ligament was resected.    A canal finder reamer was utilized to gain entry into the femur.  An intramedullary kim was placed by hand and set to 5 degrees of valgus for the distal femoral cut.  The cutting block was placed and the distal femoral cut was performed.   A sizing guide was then placed and the femoral size was measured based on posterior referencing.  The 4-in-1 cutting block was placed set to 3 degrees of external rotation.  The rotation was confirmed based on the transepicondylar axis and Reno´s line.  There was no significant hypoplasia of the posterior condyles.     Once the cutting block was placed the cuts were performed: anterior, posterior and chamfer cuts.  There collaterals were protected and the bone was removed.  There was no notching of the femur.   Once the femoral cuts were complete, we turned our attention to the tibia.  Retractors were placed  medial, lateral and posteriorly.  An extramedullary alignment kim was used and the slope was set in accordance with the implant.  We measured 2 mm of resection from the lowest point on the tibia.  The tibial cut was completed with care not to encroach posterior to the knee joint.    After the tibial cut was completed, we removed the remaining menisci.  Using a curved osteotome posterior osteophytes were carefully removed from the femur.  We then assessed the flexion and extension gaps using trial spacer blocks.    The gaps appeared symmetric with a spacer and we proceeded to placing trial implants.  With symmetric gaps we progressed to placing trial implants.The final poly was a 12 CS.    A trial tibial component was placed with care to avoid overhang.  The rotation was set in line with the medial third of the tibial tubercle.  A trial femoral component was then placed, followed by a trial articular surface.  The knee was taken through range of motion with the provisional components.  The flexion and extension gaps were evaluated, as well as the patellar tracking and mid-flexion stability.  The following soft tissue balancing, recuts, and/or modifications were required: release of additional deep MCL off the tibia.      The patella was everted and ~9 mm of bone were removed from the thickest portion using a clamp/cutting guide.  The lug holes were drilled in the patella and femur.  The tibia was prepared with the drill and broach after confirmation of alignment using a drop kim.   The sclerotic areas of the bone were drilled using a small drill bit.  The capsule around the knee was injected using a long acting local anesthetic / multimodal pain mixture.    The cement was mixed in a vacuum sealed fashion while the bone was pulsatile lavaged.   The bone was dried and the implants were placed with a gentle posterior directed force and a clamp on the patella.  The excess cement was removed.   After the cement dried the  gap balancing was reassessed prior to placing the final articular surface.  The proud cement mantle was removed using a small osteotome.  The knee was then copiously lavaged with sterile saline and Irrisept (0.05% chlorhexidine gluconate).  The tourniquet was taken down and hemostasis was obtained using Bovie electrocautery and tranexamic acid (per protocol).  No significant bleeders were encountered and the usage of a drain was not felt to be necessary.    A layered closure was performed using 1 Vicryl and 1 Stratafix in the retinacular layer and quadriceps tendon.  2-0 vicryl in the deep dermal layer and monofilament in the skin.  An adherent, water proof dressing was placed followed by Webril and an ace bandage. All counts were reported as correct.  The patient was stable to the post anesthesia care unit.    I was present and participated in the entire procedure. The Physician Assistant participated in all critical elements of the procedure under my direct supervision. The surgical incision was closed by the PA under my indirect supervision. There were no qualified residents available to assist.    The physician assistant was present for the entire case.  Given the nature of the procedure and disease process, a skilled surgical assistant was necessary for the case.  The assistant was necessary for retraction and helped directly facilitate completion of the surgery.  A certified scrub tech was at the back table managing instruments and supplies for the surgical procedure.    Complications:  None; patient tolerated the procedure well.    Disposition: PACU - hemodynamically stable.  Condition: stable         Jose Gallardo  Phone Number: [phone]

## 2024-03-25 NOTE — DISCHARGE INSTRUCTIONS
"    Knee Replacement Discharge Instructions:   Dr. Jose Gallardo    Physical Therapy / Range of Motion:    Once you are discharged from the hospital, whether you go home or to a rehabilitation facility, you should have therapy. The minimum for a knee replacement is two-three times per week.  If you find that either at home or in a rehabilitation facility, you are not receiving the appropriate amount of therapy, please call our office immediately. Therapy CANNOT be postponed or delayed!  Therapy should be done by patient on days without appointments.    Knee replacements should ideally achieve 90 degrees of flexion by 2 weeks from surgery.  Ask your therapist after each session \"What is my RANGE OF MOTION?\"  Your physician will be asking you this question at your first post-operative appointment, and each following appointment.  It is also important to work on getting the knee straight and at rest attempt to keep the knee as straight as possible.    Discharge to rehab:  If you go to a rehabilitation facility, discharge to home is determined by the specific staff at the facility when they deem you are safe to return home.  Your surgeon and his staff are not allowed to make this decision. Please inquire with the facility therapist, , and medical personnel.      Discharge to home:  Eventually, therapy will progress to an outpatient setting (ideally around 2 weeks from surgery), where you physically go to a therapy facility, either here at [facility] or somewhere close to your home. This will typically continue for at least six weeks following your surgery.  In certain cases, this may be longer depending on your progress.     Medications:  You have been prescribed medication to prevent blood clots, please follow the instructions and dosage information you were given. Please wear the WILLIAN hose stockings that you were given to help prevent blood clots for two weeks postoperatively, and do your " exercises after surgery as these will help reduce your risk of blood clots.     Prescriptions will be given to you upon discharge for pain medicine as well as blood thinning medication. Please remember to take your pain medication as directed. It is very important that you take your short-acting pain medication one hour before scheduled physical therapy. This will allow you to participate aggressively and achieve the necessary goals.     -Every effort must be made to prevent an infection in your artificial joint.  EVERY dentist or doctor that works with you should know that you have an artificial joint.  Antibiotics MUST be used before and after ANY dental procedure.    If you need a refill, please notify the office at LEAST 48 hours before you will be out of your medication. Some pain medications cannot be called in to a pharmacy and have to be printed on paper and picked up at the office. Any refills on pain medication need to be requested during clinic office hours, 8-5 on Mon-Fri.  We cannot refill pain medications on the weekend.    Wound Care:  Leave waterproof dressing in place.  As long as the Aquacel dressing is dry, intact, and occlusive at the borders, you may shower as the dressing is waterproof.     You may reinforce with other dressing materials as needed (gauze, ACE wraps, etc.) if drainage is noted, then please contact us.    May remove  the dressing at 7 days post-op.  If you are discharged to a rehab facility, the nursing staff may remove your Aquacel dressing at 7 days post-op.     You may shower normally, allowing water to run over the incision site, at ~14 days.  DO NOT RUB OR SCRUB INCISION. NO SOAKING IN A TUB OR STANDING WATER UNTIL INCISION IS WELL-HEALED AND SCABS HAVE DISAPPEARED.    -Do not apply any lotions, creams, ointments, peroxide, betadine or gels to incision and surrounding area.          -Apply ice to affected area as tolerated.      Driving:  Must be off of narcotic pain  medication and have good leg control! Approximately right le-6 weeks and left le weeks.  Please discuss with Dr. Gallardo at first post-op visit prior to resuming.     Follow-up Appointments:   Please call your surgeon's office if you are unsure about your post-op appointments. Your first post-operative appointment is made prior to surgery.     Prior, during, and after your surgery and hospital stay, please do not hesitate to call our office with any questions or concerns.    Our staff will be happy to assist you in any possible way during your personal journey through joint replacement.

## 2024-03-25 NOTE — ANESTHESIA PROCEDURE NOTES
Spinal Block    Patient location during procedure: OR  Start time: 3/25/2024 11:58 AM  End time: 3/25/2024 12:01 PM  Reason for block: primary anesthetic  Staffing  Performed: CAITLYN and OLGA   Authorized by: Gia Trujillo MD    Performed by: OLGA Hinds    Preanesthetic Checklist  Completed: patient identified, IV checked, risks and benefits discussed, surgical consent, monitors and equipment checked, pre-op evaluation, timeout performed and sterile techniques followed  Block Timeout  RN/Licensed healthcare professional reads aloud to the Anesthesia provider and entire team: Patient identity, procedure with side and site, patient position, and as applicable the availability of implants/special equipment/special requirements.  Patient on coagulant treatment: no  Timeout performed at: 3/25/2024 11:57 PM  Spinal Block  Patient position: sitting  Prep: ChloraPrep  Sterility prep: cap, drape, gloves, hand hygiene and mask  Sedation level: light sedation  Patient monitoring: blood pressure, continuous pulse oximetry and heart rate  Approach: midline  Vertebral space: L3-4  Injection technique: single-shot  Needle  Needle type: Laura   Needle gauge: 25 G  Needle length: 3.5 in  Free flowing CSF: yes    Assessment  Sensory level: T6 bilateral  Block outcome: block to be assessed in the OR  Procedure assessment: patient sedated but conversant throughout procedure  Additional Notes  Sterile technique; no complications

## 2024-03-25 NOTE — CARE PLAN
The patient's goals for the shift include  pain management    The clinical goals for the shift include remain hds as evidenced by labs and VS

## 2024-03-25 NOTE — ANESTHESIA PREPROCEDURE EVALUATION
Patient: Billie Walsh    Procedure Information       Date/Time: 03/25/24 1225    Procedure: Total Knee Arthroplasty (Right: Knee) - BLOCK PER TKA PROTOCOL    Location: [facility] OR 09 / [facility]    Surgeons: Jose Gallardo MD            Relevant Problems   Cardiovascular   (+) Arrhythmia   (+) HTN (hypertension)   (+) Hyperlipemia      Endocrine   (+) Morbid (severe) obesity due to excess calories (CMS/HCC)      GI   (+) GERD (gastroesophageal reflux disease)      Neuro/Psych   (+) CTS (carpal tunnel syndrome)   (+) Cervical radiculopathy, chronic   (+) Depression      Pulmonary   (+) TIFFANY (obstructive sleep apnea)      Musculoskeletal   (+) CTS (carpal tunnel syndrome)   (+) Osteoarthritis   (+) Primary osteoarthritis of both knees   (+) Unilateral primary osteoarthritis, right knee      Infectious Disease   (+) Paronychia of toe of left foot      Other   (+) Arthritis of knee, left   (+) Arthritis of knee, right       Clinical information reviewed:   Tobacco  Allergies  Meds   Med Hx  Surg Hx  OB Status  Fam Hx  Soc   Hx        NPO Detail:  NPO/Void Status  Carbohydrate Drink Given Prior to Surgery? : N  Date of Last Liquid: 03/24/24  Time of Last Liquid: 2200  Date of Last Solid: 03/24/24  Time of Last Solid: 2200  Last Intake Type: Food  Time of Last Void: 1051         Physical Exam    Airway  Mallampati: II  TM distance: >3 FB  Neck ROM: full     Cardiovascular - normal exam     Dental - normal exam     Pulmonary - normal exam     Abdominal - normal exam           Vitals:    03/25/24 1048   BP: 128/61   Pulse: 76   Resp: 18   Temp: 36.7 °C (98.1 °F)       Past Surgical History:   Procedure Laterality Date    BREAST BIOPSY      CARDIAC CATHETERIZATION      CHOLECYSTECTOMY      EYE SURGERY Right     2023    HYSTERECTOMY      KNEE ARTHROPLASTY      OTHER SURGICAL HISTORY  09/16/2019    Hysterectomy    OTHER SURGICAL HISTORY  09/16/2019    Cardiac catheterization    OTHER SURGICAL HISTORY  09/16/2019     Breast biopsy    OTHER SURGICAL HISTORY  09/16/2019    Cholecystectomy    TUBAL LIGATION      WISDOM TOOTH EXTRACTION       Past Medical History:   Diagnosis Date    Anemia     Occasionally    Arthritis     Cataract     GERD (gastroesophageal reflux disease)     Hypertension     Obesity     TIFFANY (obstructive sleep apnea)     Osteoarthritis     PONV (postoperative nausea and vomiting)     Stress incontinence     Urinary tract infection     Occasionally       Current Facility-Administered Medications:     ceFAZolin in dextrose (iso-os) (Ancef) IVPB 2 g, 2 g, intravenous, Once, Isaias Rivera PA-C    lactated Ringer's infusion, 50 mL/hr, intravenous, Continuous, Isaias Rivera PA-C, Last Rate: 50 mL/hr at 03/25/24 1101, 50 mL/hr at 03/25/24 1101  Prior to Admission medications    Medication Sig Start Date End Date Taking? Authorizing Provider   amLODIPine (Norvasc) 10 mg tablet Take 1 tablet (10 mg) by mouth once daily at bedtime. 3/21/24 3/21/25 Yes Mario Alberto Fountain MD   atenolol (Tenormin) 25 mg tablet Take 1 tablet (25 mg) by mouth once daily at bedtime. 3/21/24 3/16/25 Yes Mario Alberto Fountain MD   chlorhexidine (Peridex) 0.12 % solution Sig: 15 mls swish and spit the night before surgery and 15mls swish and spit the morning of surgery do not swallow 3/6/24  Yes Vania Brown APRN-CNP   cholecalciferol (Vitamin D-3) 25 MCG (1000 UT) tablet Take 1 tablet (25 mcg) by mouth once daily.   Yes Historical Provider, MD   metFORMIN (Glucophage) 500 mg tablet Take 1 tablet (500 mg) by mouth once daily at bedtime. 3/21/24 3/16/25 Yes Mario Alberto Fountain MD   lidocaine (lidocaine HCL) 4 % cream Apply 0.1 g topically 3 times a day as needed for mild pain (1 - 3) or moderate pain (4 - 6). Apply 1 inch 7/8/20   Historical Provider, MD   pantoprazole (ProtoNix) 40 mg EC tablet Take 1 tablet (40 mg) by mouth once daily.  Patient not taking: Reported on 3/25/2024 1/3/24   Mario Alberto Fountain MD   semaglutide, weight loss,  (Wegovy) 0.25 mg/0.5 mL pen injector Inject 0.25 mg under the skin 1 (one) time per week for 4 doses.  Patient not taking: Reported on 3/25/2024 3/21/24 4/12/24  Mario Alberto Fountain MD   amLODIPine (Norvasc) 10 mg tablet Take 0.5 tablets (5 mg) by mouth 2 times a day.  Patient taking differently: Take 1 tablet (10 mg) by mouth once daily at bedtime. 7/7/23 3/21/24  Steve Recinos DO   atenolol (Tenormin) 25 mg tablet Take 1 tablet (25 mg) by mouth once daily.  Patient taking differently: Take 1 tablet (25 mg) by mouth once daily at bedtime. 7/7/23 3/21/24  Steve Recinos DO   metFORMIN (Glucophage) 500 mg tablet Take 1 tablet (500 mg) by mouth once daily.  Patient taking differently: Take 1 tablet (500 mg) by mouth once daily at bedtime. 7/7/23 3/21/24  Steve Recinos DO     Allergies   Allergen Reactions    Penicillins Unknown     Childhood reaction     Social History     Tobacco Use    Smoking status: Former     Packs/day: 0.25     Years: 3.00     Additional pack years: 0.00     Total pack years: 0.75     Types: Cigarettes     Quit date: 1985     Years since quittin.2    Smokeless tobacco: Never   Substance Use Topics    Alcohol use: Not Currently     Comment: rarely         Chemistry    Lab Results   Component Value Date/Time     2024 1101    K 4.3 2024 1101     2024 1101    CO2 26 2024 1101    BUN 16 2024 1101    CREATININE 0.65 2024 1101    Lab Results   Component Value Date/Time    CALCIUM 9.8 2024 1101    ALKPHOS 106 2024 1101    AST 18 2024 1101    ALT 12 2024 1101    BILITOT 0.9 2024 1101          Lab Results   Component Value Date/Time    WBC 8.7 2024 1101    HGB 13.6 2024 1101    HCT 43.8 2024 1101     2024 1101     Lab Results   Component Value Date/Time    PROTIME 11.0 2024 1101    INR 1.0 2024 1101     Encounter Date: 24   ECG 12 Lead   Result Value     Ventricular Rate 67    Atrial Rate 67    IL Interval 184    QRS Duration 90    QT Interval 406    QTC Calculation(Bazett) 429    P Axis 34    R Axis 10    T Axis 30    QRS Count 11    Q Onset 219    P Onset 127    P Offset 185    T Offset 422    QTC Fredericia 421    Narrative    Normal sinus rhythm  Normal ECG  When compared with ECG of 29-DEC-2010 07:17,  Questionable change in QRS axis  Confirmed by Altaf Hebert ([id number]) on 3/8/2024 3:23:47 PM        Anesthesia Plan    History of general anesthesia?: yes  History of complications of general anesthesia?: no    ASA 3     spinal and regional     The patient is not a current smoker.    intravenous induction   Anesthetic plan and risks discussed with patient.    Plan discussed with CAA.

## 2024-03-25 NOTE — ANESTHESIA PROCEDURE NOTES
Peripheral Block    Patient location during procedure: pre-op  Start time: 3/25/2024 11:38 AM  End time: 3/25/2024 11:45 AM  Reason for block: at surgeon's request and post-op pain management  Staffing  Performed: attending   Authorized by: Gia Trujillo MD    Performed by: Gia Trujillo MD  Preanesthetic Checklist  Completed: patient identified, IV checked, site marked, risks and benefits discussed, surgical consent, monitors and equipment checked, pre-op evaluation and timeout performed   Timeout performed at: 3/25/2024 11:46 AM  Peripheral Block  Patient position: laying flat  Prep: ChloraPrep  Patient monitoring: heart rate and continuous pulse ox  Block type: adductor canal  Laterality: right  Injection technique: single-shot  Guidance: ultrasound guided  Local infiltration: ropivacaine  Infiltration strength: 0.5 %  Dose: 20 mL  Needle  Needle gauge: 22 G  Needle length: 8 cm  Needle localization: anatomical landmarks and ultrasound guidance  Assessment  Injection assessment: negative aspiration for heme, no paresthesia on injection, incremental injection and local visualized surrounding nerve on ultrasound  Paresthesia pain: none  Heart rate change: no  Slow fractionated injection: yes  Additional Notes  Risks and benefits of the procedure have been extensively discussed. Patient seems to understand and is willing to proceed.   Ultrasound probe at mid thigh. Lidocaine 2% to skin. Technically difficult study due to the inferior performance of the probe and resultant decreased image quality.  Pajunk SonoBlock needle introduced and advanced in-plain, from the lateral to medial. Saphenous nerve  approached at one o'clock. Needle tip placed immediately adjacent to the arterial wall. Negative aspiration confirmed twice prior to the  initial and then repeated before each subsequent incremental injection.   10 mg of PF Dexamethasone and 100 mcg Clonidine were mixed with the local anesthetic.   Tip of the needle  remained visible throughout the entire procedure. Appropriate spread of the local anesthetic mixture has been observed and documented. No immediate complications.

## 2024-03-26 VITALS
HEIGHT: 62 IN | WEIGHT: 220 LBS | DIASTOLIC BLOOD PRESSURE: 61 MMHG | BODY MASS INDEX: 40.48 KG/M2 | TEMPERATURE: 97.3 F | RESPIRATION RATE: 16 BRPM | SYSTOLIC BLOOD PRESSURE: 116 MMHG | OXYGEN SATURATION: 98 % | HEART RATE: 76 BPM

## 2024-03-26 LAB
ANION GAP SERPL CALC-SCNC: 13 MMOL/L
BUN SERPL-MCNC: 14 MG/DL (ref 6–23)
CALCIUM SERPL-MCNC: 9.2 MG/DL (ref 8.6–10.3)
CHLORIDE SERPL-SCNC: 106 MMOL/L (ref 98–107)
CO2 SERPL-SCNC: 22 MMOL/L (ref 21–32)
CREAT SERPL-MCNC: 0.58 MG/DL (ref 0.5–1.05)
EGFRCR SERPLBLD CKD-EPI 2021: >90 ML/MIN/1.73M*2
ERYTHROCYTE [DISTWIDTH] IN BLOOD BY AUTOMATED COUNT: 13.2 % (ref 11.5–14.5)
GLUCOSE SERPL-MCNC: 133 MG/DL (ref 74–99)
HCT VFR BLD AUTO: 36.6 % (ref 36–46)
HGB BLD-MCNC: 11.5 G/DL (ref 12–16)
MCH RBC QN AUTO: 28.2 PG (ref 26–34)
MCHC RBC AUTO-ENTMCNC: 31.4 G/DL (ref 32–36)
MCV RBC AUTO: 90 FL (ref 80–100)
NRBC BLD-RTO: 0 /100 WBCS (ref 0–0)
PLATELET # BLD AUTO: 265 X10*3/UL (ref 150–450)
POTASSIUM SERPL-SCNC: 3.8 MMOL/L (ref 3.5–5.3)
RBC # BLD AUTO: 4.08 X10*6/UL (ref 4–5.2)
SODIUM SERPL-SCNC: 137 MMOL/L (ref 136–145)
WBC # BLD AUTO: 17.4 X10*3/UL (ref 4.4–11.3)

## 2024-03-26 PROCEDURE — 80048 BASIC METABOLIC PNL TOTAL CA: CPT | Performed by: ORTHOPAEDIC SURGERY

## 2024-03-26 PROCEDURE — 97110 THERAPEUTIC EXERCISES: CPT | Mod: GP,CQ

## 2024-03-26 PROCEDURE — 2500000004 HC RX 250 GENERAL PHARMACY W/ HCPCS (ALT 636 FOR OP/ED): Performed by: ORTHOPAEDIC SURGERY

## 2024-03-26 PROCEDURE — 97165 OT EVAL LOW COMPLEX 30 MIN: CPT | Mod: GO | Performed by: OCCUPATIONAL THERAPIST

## 2024-03-26 PROCEDURE — 7100000011 HC EXTENDED STAY RECOVERY HOURLY - NURSING UNIT

## 2024-03-26 PROCEDURE — 85027 COMPLETE CBC AUTOMATED: CPT | Performed by: ORTHOPAEDIC SURGERY

## 2024-03-26 PROCEDURE — 2500000004 HC RX 250 GENERAL PHARMACY W/ HCPCS (ALT 636 FOR OP/ED): Performed by: PHYSICIAN ASSISTANT

## 2024-03-26 PROCEDURE — 2500000001 HC RX 250 WO HCPCS SELF ADMINISTERED DRUGS (ALT 637 FOR MEDICARE OP): Performed by: ORTHOPAEDIC SURGERY

## 2024-03-26 PROCEDURE — 97116 GAIT TRAINING THERAPY: CPT | Mod: GP,CQ

## 2024-03-26 PROCEDURE — 2500000001 HC RX 250 WO HCPCS SELF ADMINISTERED DRUGS (ALT 637 FOR MEDICARE OP): Performed by: PHYSICIAN ASSISTANT

## 2024-03-26 PROCEDURE — 2500000002 HC RX 250 W HCPCS SELF ADMINISTERED DRUGS (ALT 637 FOR MEDICARE OP, ALT 636 FOR OP/ED): Performed by: ORTHOPAEDIC SURGERY

## 2024-03-26 PROCEDURE — 36415 COLL VENOUS BLD VENIPUNCTURE: CPT | Performed by: ORTHOPAEDIC SURGERY

## 2024-03-26 RX ORDER — POLYETHYLENE GLYCOL 3350 17 G/17G
17 POWDER, FOR SOLUTION ORAL DAILY
Qty: 7 PACKET | Refills: 0
Start: 2024-03-26 | End: 2024-04-02

## 2024-03-26 RX ORDER — ASPIRIN 81 MG/1
81 TABLET ORAL 2 TIMES DAILY
Qty: 60 TABLET | Refills: 0
Start: 2024-03-26 | End: 2024-04-25

## 2024-03-26 RX ORDER — DOCUSATE SODIUM 100 MG/1
100 CAPSULE, LIQUID FILLED ORAL 2 TIMES DAILY
Qty: 14 CAPSULE | Refills: 0 | Status: SHIPPED | OUTPATIENT
Start: 2024-03-26 | End: 2024-04-02

## 2024-03-26 RX ORDER — OXYCODONE HYDROCHLORIDE 5 MG/1
5 TABLET ORAL EVERY 4 HOURS PRN
Qty: 18 TABLET | Refills: 0 | Status: SHIPPED | OUTPATIENT
Start: 2024-03-26 | End: 2024-03-29

## 2024-03-26 RX ORDER — CYCLOBENZAPRINE HCL 10 MG
10 TABLET ORAL 3 TIMES DAILY PRN
Qty: 15 TABLET | Refills: 0 | Status: SHIPPED
Start: 2024-03-26 | End: 2024-03-31

## 2024-03-26 RX ORDER — ACETAMINOPHEN 325 MG/1
650 TABLET ORAL EVERY 6 HOURS PRN
Qty: 240 TABLET | Refills: 0
Start: 2024-03-26 | End: 2024-04-25

## 2024-03-26 RX ADMIN — ACETAMINOPHEN 650 MG: 325 TABLET ORAL at 17:23

## 2024-03-26 RX ADMIN — OXYCODONE HYDROCHLORIDE 5 MG: 5 TABLET ORAL at 06:15

## 2024-03-26 RX ADMIN — ACETAMINOPHEN 650 MG: 325 TABLET ORAL at 06:14

## 2024-03-26 RX ADMIN — TRANEXAMIC ACID 1950 MG: 650 TABLET ORAL at 06:14

## 2024-03-26 RX ADMIN — CYCLOBENZAPRINE 10 MG: 10 TABLET, FILM COATED ORAL at 08:51

## 2024-03-26 RX ADMIN — ONDANSETRON 4 MG: 2 INJECTION INTRAMUSCULAR; INTRAVENOUS at 09:03

## 2024-03-26 RX ADMIN — HYDROMORPHONE HYDROCHLORIDE 0.5 MG: 1 INJECTION, SOLUTION INTRAMUSCULAR; INTRAVENOUS; SUBCUTANEOUS at 08:52

## 2024-03-26 RX ADMIN — OXYCODONE HYDROCHLORIDE 10 MG: 10 TABLET ORAL at 17:23

## 2024-03-26 RX ADMIN — ASPIRIN 81 MG: 81 TABLET, COATED ORAL at 08:51

## 2024-03-26 RX ADMIN — OXYCODONE HYDROCHLORIDE 10 MG: 10 TABLET ORAL at 12:43

## 2024-03-26 RX ADMIN — OXYCODONE HYDROCHLORIDE 5 MG: 5 TABLET ORAL at 00:44

## 2024-03-26 RX ADMIN — DOCUSATE SODIUM 100 MG: 100 CAPSULE, LIQUID FILLED ORAL at 08:51

## 2024-03-26 RX ADMIN — ACETAMINOPHEN 650 MG: 325 TABLET ORAL at 12:43

## 2024-03-26 RX ADMIN — ACETAMINOPHEN 650 MG: 325 TABLET ORAL at 00:40

## 2024-03-26 RX ADMIN — CEFAZOLIN SODIUM 2 G: 2 INJECTION, SOLUTION INTRAVENOUS at 04:36

## 2024-03-26 ASSESSMENT — PAIN SCALES - GENERAL
PAINLEVEL_OUTOF10: 6
PAINLEVEL_OUTOF10: 5 - MODERATE PAIN
PAINLEVEL_OUTOF10: 8

## 2024-03-26 ASSESSMENT — COGNITIVE AND FUNCTIONAL STATUS - GENERAL
DRESSING REGULAR UPPER BODY CLOTHING: A LITTLE
PERSONAL GROOMING: A LITTLE
DAILY ACTIVITIY SCORE: 18
TURNING FROM BACK TO SIDE WHILE IN FLAT BAD: A LITTLE
WALKING IN HOSPITAL ROOM: A LITTLE
STANDING UP FROM CHAIR USING ARMS: A LITTLE
MOVING TO AND FROM BED TO CHAIR: A LITTLE
DRESSING REGULAR LOWER BODY CLOTHING: A LITTLE
HELP NEEDED FOR BATHING: A LOT
TOILETING: A LITTLE
MOVING FROM LYING ON BACK TO SITTING ON SIDE OF FLAT BED WITH BEDRAILS: A LITTLE
MOBILITY SCORE: 17
CLIMB 3 TO 5 STEPS WITH RAILING: A LOT

## 2024-03-26 ASSESSMENT — PAIN DESCRIPTION - LOCATION
LOCATION: KNEE
LOCATION: KNEE

## 2024-03-26 ASSESSMENT — PAIN DESCRIPTION - ORIENTATION
ORIENTATION: RIGHT
ORIENTATION: RIGHT

## 2024-03-26 ASSESSMENT — PAIN - FUNCTIONAL ASSESSMENT
PAIN_FUNCTIONAL_ASSESSMENT: 0-10

## 2024-03-26 NOTE — DISCHARGE SUMMARY
Discharge Diagnosis  Unilateral primary osteoarthritis, right knee    Issues Requiring Follow-Up  Right knee replacement    Test Results Pending At Discharge  Pending Labs       No current pending labs.            Hospital Course     Discharge summary  This patient Billie Walsh was admitted to the hospital on 3/25/2024  after undergoing Procedure(s) (LRB):  Total Knee Arthroplasty (Right) without complications that morning.    During the postoperative period,while in hospital, patient was medically managed by the hospitalist. Please see medical notes and H&P for patients additional diagnoses.  Ortho agrees with all medical diagnoses and treatments while patient in hospital.  No significant or unexpected findings or abnormal ortho imaging were noted during the hospital stay    Hospital course      Patient tolerated surgical procedure well and there was no complications. Patient progressed adequately through their recovery during hospital stay including PT and rehabilitation.    Patient was then D/C on  to rehab  in stable condition.  Patient was instructed on the use of pain medications, the signs and symptoms of infection, and was given our number to call should they have any questions or concerns following discharge.    Based on my clinical judgment, the patient was provided with a 7-day prescription for opioid medication at 30 MED, indicated for treatment of acute pain in the setting of recent surgery. OARRS report was run and has demonstrated an appropriate time course.  The patient has been provided with counseling pertaining to safe use of opioid medication.      Patient may weight-bear as tolerated to operative extremity with use of walker for assistance with ambulation   Surgiacal dressing to be removed pod7 and leave incision open to air  ASA for DVT prophylaxis started on 3/26/2024 and to be taken for 30 days  Follow up with surgeon in 2 weeks             Pertinent Physical Exam At Time of  Discharge  Physical Exam    Home Medications     Medication List      START taking these medications     acetaminophen 325 mg tablet; Commonly known as: Tylenol; Take 2 tablets   (650 mg) by mouth every 6 hours if needed for mild pain (1 - 3).   aspirin 81 mg EC tablet; Take 1 tablet (81 mg) by mouth 2 times a day.   cyclobenzaprine 10 mg tablet; Commonly known as: Flexeril; Take 1 tablet   (10 mg) by mouth 3 times a day as needed for muscle spasms for up to 5   days.   docusate sodium 100 mg capsule; Commonly known as: Colace; Take 1   capsule (100 mg) by mouth 2 times a day for 7 days.   oxyCODONE 5 mg immediate release tablet; Commonly known as: Roxicodone;   Take 1 tablet (5 mg) by mouth every 4 hours if needed for moderate pain (4   - 6) or severe pain (7 - 10) for up to 3 days.   polyethylene glycol 17 gram packet; Commonly known as: Glycolax,   Miralax; Take 17 g by mouth once daily for 7 days.     CONTINUE taking these medications     amLODIPine 10 mg tablet; Commonly known as: Norvasc; Take 1 tablet (10   mg) by mouth once daily at bedtime.   atenolol 25 mg tablet; Commonly known as: Tenormin; Take 1 tablet (25   mg) by mouth once daily at bedtime.   cholecalciferol 25 MCG (1000 UT) tablet; Commonly known as: Vitamin D-3   metFORMIN 500 mg tablet; Commonly known as: Glucophage; Take 1 tablet   (500 mg) by mouth once daily at bedtime.     STOP taking these medications     chlorhexidine 0.12 % solution; Commonly known as: Peridex   lidocaine 4 % cream; Commonly known as: LMX     ASK your doctor about these medications     pantoprazole 40 mg EC tablet; Commonly known as: ProtoNix; Take 1 tablet   (40 mg) by mouth once daily.   Wegovy 0.25 mg/0.5 mL pen injector; Generic drug: semaglutide (weight   loss); Inject 0.25 mg under the skin 1 (one) time per week for 4 doses.       Outpatient Follow-Up  Future Appointments   Date Time Provider Department Center   4/16/2024  2:00 PM Jose Gallardo MD [id number] [place]    6/10/2024  1:15 PM Bette Reena, APRN-CNP [id number] [place]   6/21/2024 10:20 AM Mario Alberto Fountain MD [id number] [place]   2/3/2025  1:30 PM Roberto Jarquin DO [id number] [place]   3/26/2025 11:00 AM Mario Alberto Fountain MD [id number] [place]       [facility]

## 2024-03-26 NOTE — PROGRESS NOTES
Physical Therapy    Physical Therapy Evaluation    Patient Name: Billie Walsh  MRN: [id number]  Today's Date: 3/25/2024   Time Calculation  Start Time: 1842  Stop Time: 1912  Time Calculation (min): 30 min    Assessment/Plan   PT Assessment  PT Assessment Results: Decreased strength, Decreased range of motion, Decreased endurance, Decreased mobility, Decreased coordination, Obesity, Pain, Decreased safety awareness  Rehab Prognosis: Good  Evaluation/Treatment Tolerance: Patient limited by fatigue  Medical Staff Made Aware: Yes  End of Session Communication: Bedside nurse  Assessment Comment: expect better performance tomorrow.  End of Session Patient Position: Bed, 3 rail up, Alarm on  IP OR SWING BED PT PLAN  Inpatient or Swing Bed: Inpatient  PT Plan  Treatment/Interventions: Bed mobility, Transfer training, Gait training, Stair training, Strengthening, Endurance training, Range of motion, Therapeutic exercise, Therapeutic activity, Home exercise program (issued a handout on a/p, qs, and gs ex..  To work on tonight q/ 2 hrs.)  PT Plan: Skilled PT (Pt. had discussed going for some rehab with [facility] due to having a lot of steps to climb at home.  Only bathroom on 2nd floor.)  PT Frequency: BID  PT Discharge Recommendations: Moderate intensity level of continued care  Equipment Recommended upon Discharge: Wheeled walker, Straight cane  PT Recommended Transfer Status: Assist x1  PT - OK to Discharge: Yes (When medically allowed.)    Subjective     Current Problem:  Patient Active Problem List   Diagnosis    Arrhythmia    Arthritis of knee, left    Arthritis of knee, right    Back pain    Cervical radiculopathy, chronic    Cervical stricture or stenosis    Chronic pain of toe of left foot    CRP elevated    CTS (carpal tunnel syndrome)    DOS (diffuse esophageal spasm)    Dysphagia    Effusion of left knee    Fatigue    GERD (gastroesophageal reflux disease)    Left upper quadrant abdominal pain    Joint pain     Hypokalemia    Hyperlipemia    HTN (hypertension)    Headache    Pain in both hands    Osteopenia    Osteoarthritis    TIFFANY (obstructive sleep apnea)    Myofascial pain syndrome    Motion sickness    Metabolic syndrome    Sinusitis    Pain of thoracic facet joint    Palpitations    Paronychia of toe of left foot    Premature menopause    Primary osteoarthritis of both knees    Shoulder pain    Vitamin D deficiency    Tenosynovitis, de Quervain    Snoring    Sleep disorder breathing    Strain of intrinsic muscle of thumb    Hand pain    Depression    Morbid (severe) obesity due to excess calories (CMS/[facility])    Left knee pain    Status post total left knee replacement    Tendinitis of left rotator cuff    Acute right flank pain    Poor posture    Stress incontinence of urine    Unilateral primary osteoarthritis, right knee    Preop general physical exam    Abnormal weight gain    Obesity with body mass index 30 or greater    Digestive system disorder    Disorders of glucose transport, 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  Family/Caregiver Present: No  Prior to Session Communication: Bedside nurse  Patient Position Received: Bed, 3 rail up, Alarm on  Preferred Learning Style: verbal, visual, written  General Comment: Has SCD's., IV, pure-wick, and an ice machine.    Home Living:  Home Living  Type of Home: House  Lives With: Spouse  Home Adaptive Equipment: Walker rolling or standard, Cane  Home Layout: Two level  Home Access: Stairs to enter with rails  Entrance Stairs-Number of Steps: 4  Bathroom Shower/Tub:  "Walk-in shower (with a bench.  no gb's.)  Home Living Comments: bed/bathroom are upstairs x 15 stairs with a rail.    Prior Level of Function:  Prior Function Per Pt/Caregiver Report  Level of [place]: Independent with homemaking with ambulation  Ambulatory Assistance: Independent    Precautions:  Precautions  LE Weight Bearing Status: Weight Bearing as Tolerated (/ FWB.)  Medical Precautions: Fall precautions  Post-Surgical Precautions: Right total knee precautions  Precautions Comment: No pivoting.    Vital Signs:  Vital Signs  SpO2: 95 % (on room air after up and walking.  Off 2L O2 x 15 mins..)  Patient Position: Lying  Objective     Pain:  Pain Assessment  Pain Assessment: 0-10  Pain Score: 3  Pain Type: Surgical pain  Pain Location: Knee  Pain Orientation: Right  Pain Interventions: Medication (See MAR), Rest, Elevated, Cold applied    Cognition:  Cognition  Overall Cognitive Status: Within Functional Limits  Orientation Level: Oriented X4    General Assessments:  General Observation  General Observation: c/o feeling 'Groggy\".   Activity Tolerance  Endurance: Decreased tolerance for upright activites  Sensation  Light Touch: No apparent deficits  Strength  Strength Comments: 3-/5=R Quads. and Fair SLR.     Coordination  Movements are Fluid and Coordinated: Yes  Coordination Comment: Had difficulty with 1st step fwd..  Improved with distance.  Postural Control  Postural Control: Within Functional Limits          Functional Assessments:     Bed Mobility  Bed Mobility: Yes  Bed Mobility 1  Bed Mobility 1: Supine to sitting, Sitting to supine  Level of Assistance 1: Minimum assistance (x 1.)  Transfers  Transfer: Yes  Transfer 1  Transfer From 1: Sit to, Stand to  Transfer Device 1: Walker  Transfer Level of Assistance 1: Minimum assistance (x 1.)  Ambulation/Gait Training  Ambulation/Gait Training Performed: Yes  Ambulation/Gait Training 1  Surface 1: Level tile  Device 1: Rolling walker  Gait Support " Devices: Gait belt  Assistance 1: Minimum assistance, Contact guard (MIN A for 1st step, then CGA after that.)  Quality of Gait 1: Decreased step length  Comments/Distance (ft) 1: x 8 ft.(4 fwd. and 4 bwd.)  Stairs  Stairs: No       Extremity/Trunk Assessments:        AROM RLE (degrees)  R Knee Flexion 0-130: 80  R Knee Extension 0-130: 15  LLE   LLE : Within Functional Limits    Outcome Measures:  [facility] Basic Mobility  Turning from your back to your side while in a flat bed without using bedrails: A little  Moving from lying on your back to sitting on the side of a flat bed without using bedrails: A little  Moving to and from bed to chair (including a wheelchair): A little  Standing up from a chair using your arms (e.g. wheelchair or bedside chair): A little  To walk in hospital room: A little  Climbing 3-5 steps with railing: A lot  Basic Mobility - Total Score: 17                            Goals:  Encounter Problems       Encounter Problems (Active)       Mobility       STG - Patient will ambulate x 50-75 ft. with W/W, SBA.         Start:  03/25/24    Expected End:  04/08/24            STG - Patient will ascend and descend four to six stairs WITH RAIL and a cane.        Start:  03/25/24    Expected End:  04/08/24            Goal 1: Demo 0-90 degrees R knee.         Start:  03/25/24    Expected End:  04/08/24            Goal 2: Achieve 3+/5=R Quads. and a Good SLR.       Start:  03/25/24    Expected End:  04/08/24               PT Transfers       STG - Patient will transfer sit to and from stand itno a w/w with SBA.         Start:  03/25/24    Expected End:  04/08/24               Pain - Adult          Safety       STG - Patient uses gait belt during all transfers AND W/W amb. trng..         Start:  03/25/24    Expected End:  04/08/24                 Education Documentation  Handouts, taught by Jason Cristina, PT at 3/25/2024  9:03 PM.  Learner: Patient  Readiness: Acceptance  Method: Demonstration,  Explanation, Handout  Response: Demonstrated Understanding, Needs Reinforcement    Precautions, taught by Jason Cristina, PT at 3/25/2024  9:03 PM.  Learner: Patient  Readiness: Acceptance  Method: Demonstration, Explanation, Handout  Response: Demonstrated Understanding, Needs Reinforcement    Body Mechanics, taught by Jason Cristina, PT at 3/25/2024  9:03 PM.  Learner: Patient  Readiness: Acceptance  Method: Demonstration, Explanation, Handout  Response: Demonstrated Understanding, Needs Reinforcement    Home Exercise Program, taught by Jason Cristina, PT at 3/25/2024  9:03 PM.  Learner: Patient  Readiness: Acceptance  Method: Demonstration, Explanation, Handout  Response: Demonstrated Understanding, Needs Reinforcement    Mobility Training, taught by Jason Cristina PT at 3/25/2024  9:03 PM.  Learner: Patient  Readiness: Acceptance  Method: Demonstration, Explanation, Handout  Response: Demonstrated Understanding, Needs Reinforcement    Education Comments  No comments found.

## 2024-03-26 NOTE — PROGRESS NOTES
03/26/24 1059   Discharge Planning   Living Arrangements Spouse/significant other   Support Systems Spouse/significant other   Type of Residence Private residence   Home or Post Acute Services In home services;Post acute facilities (Rehab/SNF/etc)   Type of Post Acute Facility Services Rehab   Patient expects to be discharged to: Home with [facility] vs Acute Rehab   Does the patient need discharge transport arranged? Yes   RoundTrip coordination needed? Yes     Met with patient at bedside. Admitted for right total knee replacement. Pt lives with spouse and was independent PTA with no HHC. Pt has a walker. Was able to drive and obtain medications. PCP is Dr Medrano. PT [facility] 17, recommending moderate intensity therapy. Pt is concern about going home. Her spouse works full time and she has 15 stairs to climb to bathroom. Would like referrals sent to acute rehab. [facility] sent to [facility] and [facility]. Backup plan would be home with [facility] once she is able to climb stairs.  [facility] can accept patient. No precert needed.  Pt and medical team updated.   1444 Medically ready for discharge. Transport scheduled for 1800. Facility notified.

## 2024-03-26 NOTE — PROGRESS NOTES
Physical Therapy    Physical Therapy    Physical Therapy Treatment    Patient Name: Billie Walsh  MRN: [id number]  Today's Date: 3/26/2024  Time Calculation  Start Time: 1124  Stop Time: 1200  Time Calculation (min): 36 min       Assessment/Plan   PT Assessment  End of Session Communication: Bedside nurse  End of Session Patient Position: Up in chair, Alarm on  PT Plan  Inpatient/Swing Bed or Outpatient: Inpatient  PT Plan  Treatment/Interventions: Bed mobility, Transfer training, Gait training, Stair training, Strengthening, Endurance training, Range of motion, Therapeutic exercise, Therapeutic activity, Home exercise program (issued a handout on a/p, qs, and gs ex..  To work on tonight q/ 2 hrs.)  PT Plan: Skilled PT (Pt. had discussed going for some rehab with [facility] due to having a lot of steps to climb at home.  Only bathroom on 2nd floor.)  PT Frequency: BID  PT Discharge Recommendations: Moderate intensity level of continued care  Equipment Recommended upon Discharge: Wheeled walker, Straight cane  PT Recommended Transfer Status: Assist x1  PT - OK to Discharge: Yes (When medically allowed.)     03/26/24 1124   PT  Visit   PT Received On 03/26/24   General   Reason for Referral TKR   Referred By RUI Gallardo MD   Patient Position Received Bed, 3 rail up   Preferred Learning Style verbal;visual   Precautions   LE Weight Bearing Status Weight Bearing as Tolerated   Post-Surgical Precautions Right total knee precautions   Pain Assessment   Pain Assessment 0-10   Pain Score 8   Pain Type Surgical pain   Pain Location Knee   Pain Orientation Right   Therapeutic Exercise   Therapeutic Exercise Performed Yes   Therapeutic Exercise Activity 1 AP,QS,GS,SLR,HIP ABD, MARCHING,BALL SQUEEZES X 20 B WITH EMPHASIS ON OPERATED LE   Therapeutic Exercise Activity 2 ROM  (Right knee5-98)   Bed Mobility   Bed Mobility Yes   Bed Mobility 1   Bed Mobility 1 Supine to sitting;Sitting to supine   Level of Assistance 1 Minimum  assistance   Ambulation/Gait Training   Ambulation/Gait Training Performed Yes   Ambulation/Gait Training 1   Surface 1 Level tile   Device 1 Rolling walker   Gait Support Devices Gait belt   Assistance 1 Minimum assistance;Contact guard   Comments/Distance (ft) 1 45 x 2   Transfers   Transfer Yes   Transfer 1   Technique 1 Sit to stand;Stand to sit   Transfer Level of Assistance 1 Contact guard   Stairs   Stairs Yes   Stairs   Rails 1 Right;Left   Device 1 Single point cane   Assistance 1 Contact guard;Minimum assistance   Comment/Number of Steps 1 6   PT Assessment   End of Session Communication Bedside nurse   End of Session Patient Position Up in chair;Alarm on   PT Plan   Inpatient/Swing Bed or Outpatient Inpatient     Outcome Measures:  [facility] Basic Mobility  Turning from your back to your side while in a flat bed without using bedrails: A little  Moving from lying on your back to sitting on the side of a flat bed without using bedrails: A little  Moving to and from bed to chair (including a wheelchair): A little  Standing up from a chair using your arms (e.g. wheelchair or bedside chair): A little  To walk in hospital room: A little  Climbing 3-5 steps with railing: A lot  Basic Mobility - Total Score: 17                             EDUCATION:     Education Documentation  No documentation found.  Education Comments  No comments found.        GOALS:  Encounter Problems       Encounter Problems (Active)       Mobility       STG - Patient will ambulate x 50-75 ft. with W/W, SBA.   (Progressing)       Start:  03/25/24    Expected End:  04/08/24            STG - Patient will ascend and descend four to six stairs WITH RAIL and a cane.  (Progressing)       Start:  03/25/24    Expected End:  04/08/24            Goal 1: Demo 0-90 degrees R knee.   (Progressing)       Start:  03/25/24    Expected End:  04/08/24            Goal 2: Achieve 3+/5=R Quads. and a Good SLR. (Progressing)       Start:  03/25/24    Expected End:   04/08/24               PT Transfers       STG - Patient will transfer sit to and from stand itno a w/w with SBA.   (Progressing)       Start:  03/25/24    Expected End:  04/08/24               Pain - Adult          Safety       STG - Patient uses gait belt during all transfers AND W/W amb. trng..   (Progressing)       Start:  03/25/24    Expected End:  04/08/24

## 2024-03-26 NOTE — PROGRESS NOTES
"Billie Walsh is a 68 y.o. female on day 0 of admission presenting with Unilateral primary osteoarthritis, right knee.    Subjective   Sitting up in chair upon entering room after working with therapy.  Currently verbalizing no complaints.  Tolerating a diet with no nausea vomiting.  Voiding well.  No complaints of chest pain, shortness of breath, lightheaded or dizziness.  Patient prefers discharge to ECF as she has multiple steps in her home and would be unable to navigate the in her current condition.       Objective     Physical Exam  Constitutional:       Appearance: Normal appearance.   HENT:      Head: Normocephalic and atraumatic.      Nose: Nose normal.      Mouth/Throat:      Mouth: Mucous membranes are moist.   Cardiovascular:      Rate and Rhythm: Normal rate.   Pulmonary:      Effort: Pulmonary effort is normal.   Abdominal:      General: Abdomen is flat.      Palpations: Abdomen is soft.   Musculoskeletal:      Cervical back: Neck supple.      Comments: Surgical dressing in place to right knee, no staining appreciated, distally sensation is present in right lower extremity, plantar and dorsiflexion against resistance present in right foot, DP pulse palpable right foot   Skin:     General: Skin is warm and dry.   Neurological:      General: No focal deficit present.      Mental Status: She is alert.   Psychiatric:         Mood and Affect: Mood normal.         Last Recorded Vitals  Blood pressure 115/57, pulse 53, temperature 36 °C (96.8 °F), temperature source Temporal, resp. rate 16, height 1.575 m (5' 2\"), weight 99.8 kg (220 lb), SpO2 94 %.  Intake/Output last 3 Shifts:  I/O last 3 completed shifts:  In: 2807.5 (28.1 mL/kg) [P.O.:250; I.V.:2457.5 (24.6 mL/kg); IV Piggyback:100]  Out: 1920 (19.2 mL/kg) [Urine:1900 (0.5 mL/kg/hr); Blood:20]  Weight: 99.8 kg     Relevant Results  Results for orders placed or performed during the hospital encounter of 03/25/24 (from the past 24 hour(s))   Protime-INR "   Result Value Ref Range    Protime 11.5 9.8 - 12.8 seconds    INR 1.0 0.9 - 1.1   CBC   Result Value Ref Range    WBC 17.4 (H) 4.4 - 11.3 x10*3/uL    nRBC 0.0 0.0 - 0.0 /100 WBCs    RBC 4.08 4.00 - 5.20 x10*6/uL    Hemoglobin 11.5 (L) 12.0 - 16.0 g/dL    Hematocrit 36.6 36.0 - 46.0 %    MCV 90 80 - 100 fL    MCH 28.2 26.0 - 34.0 pg    MCHC 31.4 (L) 32.0 - 36.0 g/dL    RDW 13.2 11.5 - 14.5 %    Platelets 265 150 - 450 x10*3/uL   Basic metabolic panel   Result Value Ref Range    Glucose 133 (H) 74 - 99 mg/dL    Sodium 137 136 - 145 mmol/L    Potassium 3.8 3.5 - 5.3 mmol/L    Chloride 106 98 - 107 mmol/L    Bicarbonate 22 21 - 32 mmol/L    Anion Gap 13 mmol/L    Urea Nitrogen 14 6 - 23 mg/dL    Creatinine 0.58 0.50 - 1.05 mg/dL    eGFR >90 >60 mL/min/1.73m*2    Calcium 9.2 8.6 - 10.3 mg/dL     Scheduled medications  acetaminophen, 650 mg, oral, q6h KAYLIN  amLODIPine, 10 mg, oral, Nightly  aspirin, 81 mg, oral, BID  atenolol, 25 mg, oral, Nightly  docusate sodium, 100 mg, oral, BID  metFORMIN, 500 mg, oral, Nightly      Continuous medications  lactated Ringer's, 50 mL/hr, Last Rate: 50 mL/hr (03/26/24 1001)  lactated Ringer's, 50 mL/hr, Last Rate: Stopped (03/26/24 0708)  oxygen, 2 L/min      PRN medications  PRN medications: benzocaine-menthol, bisacodyl, cyclobenzaprine, diphenhydrAMINE, HYDROmorphone, naloxone, naloxone, naloxone, naloxone, ondansetron ODT **OR** ondansetron, oxyCODONE, oxyCODONE, prochlorperazine **OR** prochlorperazine **OR** prochlorperazine    Assessment/Plan   Principal Problem:    Unilateral primary osteoarthritis, right knee  Active Problems:    Total knee replacement status, right    Postop day 1 of right unicondylar total knee arthroplasty  Physical and Occupational Therapy are on consult  Weightbearing as tolerated with walker  ASA for VTE prophylaxis  Labs reviewed  Multimodal pain regime  Home medications ordered for chronic medical conditions as appropriate  Bowel regime  Encourage  incentive spirometry  Plans on discharge to ECF as she has multiple steps at home and would have difficulty navigating them safely and current condition  Follow-up with Dr. Gallardo as directed       I spent 25 minutes in the professional and overall care of this patient.      Karoline Murillo PA-C

## 2024-03-26 NOTE — NURSING NOTE
Patient with discharge order for today. To be transferred to [facility] [place]. Report called to nurseMaggie.

## 2024-03-26 NOTE — PROGRESS NOTES
Occupational Therapy    Evaluation    Patient Name: Billie Walsh  MRN: [id number]  Today's Date: 3/26/2024  Time Calculation  Start Time: 0903  Stop Time: 0929  Time Calculation (min): 26 min  Eval only     Assessment  IP OT Assessment  Prognosis: Good  End of Session Communication: Bedside nurse  End of Session Patient Position: Up in chair, Alarm on  Patient presents with decline in ADLs, functional transfers, and functional mobility tasks and would benefit from OT during acute stay to maximize functional independence and safety.  Patient requires 24 hours hands on assistance.  Recommend moderate intensity OT vs low intensity  to maximize functional independence and safety.     Plan:  Treatment Interventions: ADL retraining, Functional transfer training, Patient/family training, Equipment evaluation/education, Compensatory technique education  OT Frequency: Daily  OT Discharge Recommendations: Moderate intensity level of continued care (vs low intensity pending progress)  OT - OK to Discharge: Yes from acute care OT services to the next level of care when cleared by medical team      Subjective     Current Problem:  1. Unilateral primary osteoarthritis, right knee            General:  General  Reason for Referral: TKR  Referred By: RUI Gallardo MD  Past Medical History Relevant to Rehab: Admitted 3/25/2024 after having right TKA completed by Dr Gallardo.  PMH: HTN, GERD, depession, HLD  Patient Position Received: Bed, 3 rail up  Preferred Learning Style: verbal  General Comment: Patient seated in bedside chiair and agreeable to participate in OT evaluaiton.    Precautions:  LE Weight Bearing Status: Weight Bearing as Tolerated  Medical Precautions: Fall precautions    Pain:  Pain Assessment  Pain Assessment: 0-10  Pain Score: 8  Pain Type: Surgical pain  Pain Location: Knee  Pain Orientation: Right  Pain Interventions: Cold applied    Objective   Cognition:  Overall Cognitive Status: Within Functional  Limits    Home Living:  Home Living Comments: Lives at home with spouse with 4 PATRICK.  Has bedroom and bathroom on 2nd floor where there are 15 steps up. Has WIS with built in bench     Prior Function:  Prior Function Comments: Was independent with all ADLs    ADL:  UE Dressing Assistance: Stand by (don pull over shirt)  LE Dressing Assistance: Minimal  ADL Comments: Educated patient on safety and comp strategies for LB dressing and patient verbalized understanding    Activity Tolerance:  Endurance: Endurance does not limit participation in activity    Bed Mobility/Transfers:      Transfers  Transfer:  (SBA sit <>stand with min verbal cues for safety and technique)  Educated patient on safety with car transfers and patient verbalized understanding.    Educated patient on safety and use of shower chair for safety and patient verbalized understanding. Educated patient on having [facility] assess shower safety prior to completing first shower and patient verbalized understanding.   Educated patient on the possibility of having bedside commode on first floor while spouse is at work during the day and to have spouse empty when he gets home from work if she plans to return home.      Ambulation/Gait Training:  Functional Mobility  Functional Mobility Performed:  (CGA with WW functional mobility task)    Extremities: RUE   RUE : Within Functional Limits and LUE   LUE: Within Functional Limits    Outcome Measures: [facility] Daily Activity  Putting on and taking off regular lower body clothing: A little  Bathing (including washing, rinsing, drying): A lot  Putting on and taking off regular upper body clothing: A little  Toileting, which includes using toilet, bedpan or urinal: A little  Taking care of personal grooming such as brushing teeth: A little  Eating Meals: None  Daily Activity - Total Score: 18    EDUCATION:  Education  Individual(s) Educated: Patient  Education Provided: Fall precautons (safety and comp strategies wtih LB  dressing, safety with car transfers and shower transfers)  Patient Response to Education: Patient/Caregiver Verbalized Understanding of Information    Goals:   Encounter Problems       Encounter Problems (Active)       Dressings Lower Extremities       STG - Patient will complete lower body dressing independently with AE and comp strategies  (Progressing)       Start:  03/26/24    Expected End:  04/09/24               Functional Balance       STG-Patient will be independent with assistive device dynamic stand task >5 minutes for ADL completion   (Progressing)       Start:  03/26/24    Expected End:  04/09/24               Functional Mobility       STG-Patient will be independent with assistive device functional mobility tasks   (Progressing)       Start:  03/26/24    Expected End:  04/09/24               OT Transfers       STG - Patient will perform car transfers independently demonstrating good safety (Progressing)       Start:  03/26/24    Expected End:  04/09/24            STG-Patient will be independent with functional transfers demonstrating good safety   (Progressing)       Start:  03/26/24    Expected End:  04/09/24

## 2024-03-26 NOTE — PROGRESS NOTES
Physical Therapy    Physical Therapy    Physical Therapy Treatment    Patient Name: Billie Walsh  MRN: [id number]  Today's Date: 3/26/2024  Time Calculation  Start Time: 0816  Stop Time: 0855  Time Calculation (min): 39 min       Assessment/Plan   PT Assessment  End of Session Communication: Bedside nurse  End of Session Patient Position: Up in chair, Alarm on  PT Plan  Inpatient/Swing Bed or Outpatient: Inpatient  PT Plan  Treatment/Interventions: Bed mobility, Transfer training, Gait training, Stair training, Strengthening, Endurance training, Range of motion, Therapeutic exercise, Therapeutic activity, Home exercise program (issued a handout on a/p, qs, and gs ex..  To work on tonight q/ 2 hrs.)  PT Plan: Skilled PT (Pt. had discussed going for some rehab with SANTOS Gallardo due to having a lot of steps to climb at home.  Only bathroom on 2nd floor.)  PT Frequency: BID  PT Discharge Recommendations: Moderate intensity level of continued care  Equipment Recommended upon Discharge: Wheeled walker, Straight cane  PT Recommended Transfer Status: Assist x1  PT - OK to Discharge: Yes (When medically allowed.)     03/26/24 0816   PT  Visit   PT Received On 03/26/24   General   Reason for Referral TKR   Patient Position Received Bed, 3 rail up   Preferred Learning Style verbal;visual   Precautions   LE Weight Bearing Status Weight Bearing as Tolerated   Post-Surgical Precautions Right total knee precautions   Pain Assessment   Pain Assessment 0-10   Pain Score 8   Pain Type Surgical pain   Pain Location Knee   Pain Orientation Right   Pain Interventions Cold applied   Cognition   Overall Cognitive Status WFL   Therapeutic Exercise   Therapeutic Exercise Performed Yes   Therapeutic Exercise Activity 1 AP,QS,GS,SLR,HIP ABD, MARCHING,BALL SQUEEZES X 20 B WITH EMPHASIS ON OPERATED LE   Therapeutic Exercise Activity 2 ROM  (Right knee5-98)   Ambulation/Gait Training   Ambulation/Gait Training Performed Yes   Ambulation/Gait  Training 1   Surface 1 Level tile   Device 1 Rolling walker   Gait Support Devices Gait belt   Assistance 1 Minimum assistance;Contact guard   Comments/Distance (ft) 1 45 x 2  (Patient demos mainly step to gait)   Transfers   Transfer Yes   Transfer 1   Technique 1 Sit to stand;Stand to sit   Transfer Device 1 Walker;Gait belt   Transfer Level of Assistance 1 Contact guard   Trials/Comments 1 x3   PT Assessment   End of Session Communication Bedside nurse   End of Session Patient Position Up in chair;Alarm on   PT Plan   Inpatient/Swing Bed or Outpatient Inpatient     Outcome Measures:  [facility] Basic Mobility  Turning from your back to your side while in a flat bed without using bedrails: A little  Moving from lying on your back to sitting on the side of a flat bed without using bedrails: A little  Moving to and from bed to chair (including a wheelchair): A little  Standing up from a chair using your arms (e.g. wheelchair or bedside chair): A little  To walk in hospital room: A little  Climbing 3-5 steps with railing: A lot  Basic Mobility - Total Score: 17                             EDUCATION:     Education Documentation  No documentation found.  Education Comments  No comments found.        GOALS:  Encounter Problems       Encounter Problems (Active)       Mobility       STG - Patient will ambulate x 50-75 ft. with W/W, SBA.   (Progressing)       Start:  03/25/24    Expected End:  04/08/24            STG - Patient will ascend and descend four to six stairs WITH RAIL and a cane.  (Progressing)       Start:  03/25/24    Expected End:  04/08/24            Goal 1: Demo 0-90 degrees R knee.   (Progressing)       Start:  03/25/24    Expected End:  04/08/24            Goal 2: Achieve 3+/5=R Quads. and a Good SLR. (Progressing)       Start:  03/25/24    Expected End:  04/08/24               PT Transfers       STG - Patient will transfer sit to and from stand itno a w/w with SBA.   (Progressing)       Start:  03/25/24     Expected End:  04/08/24               Pain - Adult          Safety       STG - Patient uses gait belt during all transfers AND W/W amb. trng..   (Progressing)       Start:  03/25/24    Expected End:  04/08/24

## 2024-03-27 NOTE — NURSING NOTE
Pt was picked up at 7:40 pm by transportation. Vitals as follow: T:36.3, BP: 116/61(83), HR: 76, SPO2: 98%. Iv access removed, belongings with Pt.

## 2024-03-28 ENCOUNTER — DOCUMENTATION (OUTPATIENT)
Dept: PHARMACY | Facility: HOSPITAL | Age: 69
End: 2024-03-28
Payer: MEDICARE

## 2024-03-28 NOTE — PROGRESS NOTES
Prior Authorization - Wegovy  A prior authorization request for Billie Walsh was submitted for Wegovy on 3/21/24.  The authorization request was denied by the patient's insurance on 03/21/24.    Key: [id number]  Reason for Denial: Plan Benefit Exclusion    Please contact clinical pharmacy with any further questions. Thank you.    Iveth Leyva, PharmD  [phone]  03/28/24

## 2024-04-12 ENCOUNTER — TELEPHONE (OUTPATIENT)
Dept: PRIMARY CARE | Facility: CLINIC | Age: 69
End: 2024-04-12
Payer: MEDICARE

## 2024-04-12 DIAGNOSIS — R31.21 ASYMPTOMATIC MICROSCOPIC HEMATURIA: Primary | ICD-10-CM

## 2024-04-12 NOTE — TELEPHONE ENCOUNTER
Patients reports having blood in urine and that urine was brownish yesterday- Pt can be reached at [phone]  
No

## 2024-04-16 ENCOUNTER — HOSPITAL ENCOUNTER (OUTPATIENT)
Dept: RADIOLOGY | Facility: CLINIC | Age: 69
Discharge: HOME | End: 2024-04-16
Payer: MEDICARE

## 2024-04-16 ENCOUNTER — LAB (OUTPATIENT)
Dept: LAB | Facility: LAB | Age: 69
End: 2024-04-16
Payer: MEDICARE

## 2024-04-16 ENCOUNTER — OFFICE VISIT (OUTPATIENT)
Dept: ORTHOPEDIC SURGERY | Facility: CLINIC | Age: 69
End: 2024-04-16
Payer: MEDICARE

## 2024-04-16 DIAGNOSIS — M25.561 RIGHT KNEE PAIN, UNSPECIFIED CHRONICITY: ICD-10-CM

## 2024-04-16 DIAGNOSIS — Z96.659 STATUS POST TOTAL KNEE REPLACEMENT, UNSPECIFIED LATERALITY: ICD-10-CM

## 2024-04-16 DIAGNOSIS — G89.18 POST-OP PAIN: ICD-10-CM

## 2024-04-16 DIAGNOSIS — R31.21 ASYMPTOMATIC MICROSCOPIC HEMATURIA: ICD-10-CM

## 2024-04-16 PROCEDURE — 1036F TOBACCO NON-USER: CPT | Performed by: ORTHOPAEDIC SURGERY

## 2024-04-16 PROCEDURE — 3008F BODY MASS INDEX DOCD: CPT | Performed by: ORTHOPAEDIC SURGERY

## 2024-04-16 PROCEDURE — 87086 URINE CULTURE/COLONY COUNT: CPT

## 2024-04-16 PROCEDURE — 99024 POSTOP FOLLOW-UP VISIT: CPT | Performed by: ORTHOPAEDIC SURGERY

## 2024-04-16 PROCEDURE — 1160F RVW MEDS BY RX/DR IN RCRD: CPT | Performed by: ORTHOPAEDIC SURGERY

## 2024-04-16 PROCEDURE — 1123F ACP DISCUSS/DSCN MKR DOCD: CPT | Performed by: ORTHOPAEDIC SURGERY

## 2024-04-16 PROCEDURE — 81001 URINALYSIS AUTO W/SCOPE: CPT

## 2024-04-16 PROCEDURE — 73562 X-RAY EXAM OF KNEE 3: CPT | Mod: RIGHT SIDE | Performed by: RADIOLOGY

## 2024-04-16 PROCEDURE — 1159F MED LIST DOCD IN RCRD: CPT | Performed by: ORTHOPAEDIC SURGERY

## 2024-04-16 PROCEDURE — 1157F ADVNC CARE PLAN IN RCRD: CPT | Performed by: ORTHOPAEDIC SURGERY

## 2024-04-16 PROCEDURE — 73562 X-RAY EXAM OF KNEE 3: CPT | Mod: RT

## 2024-04-16 RX ORDER — OXYCODONE AND ACETAMINOPHEN 5; 325 MG/1; MG/1
1 TABLET ORAL EVERY 6 HOURS
Qty: 28 TABLET | Refills: 0 | Status: SHIPPED | OUTPATIENT
Start: 2024-04-16 | End: 2024-04-23

## 2024-04-16 NOTE — PROGRESS NOTES
History of Present Illness:  Patient returns today endorsing moderate pain.  Denies any numbness or tingling.  No calf pain, No chest pain or shortness of breath.    Physical Examination:  Mild swelling  Healthy incision, no erythema or drainage  ROM:  0-95  + Plantar/dorsiflexion  Negative Brenda test    Imaging:  Appropriate position and alignment no evidence of implant failure     Assessment  Patient status post right total knee arthroplasty, doing well    Plan:  Discussed analgesics, encouraging non-opioid modalities.  Encouraged ice, rest.  Discussed importance of ROM/ formal physical therapy.  Reviewed dental prophylaxis.  Follow-up in 1 month for a motion check.    I have personally reviewed the OARRS report for this patient. This report is scanned into  the electronic medical record. I have considered the risks of abuse, dependence, addiction, and diversion. They currently report a pain of 8.     Isaias Rivera PA-C      In a face to face encounter, I evaluated the patient and performed a physical examination, discussed pertinent diagnostic studies if indicated and discussed diagnosis and management strategies with both the patient and physician assistant / nurse practitioner.  I reviewed the PA/NP's note and agree with the documented findings and plan of care.        Jose Gallardo MD

## 2024-04-17 LAB
APPEARANCE UR: ABNORMAL
BILIRUB UR STRIP.AUTO-MCNC: NEGATIVE MG/DL
COLOR UR: YELLOW
GLUCOSE UR STRIP.AUTO-MCNC: NORMAL MG/DL
HYALINE CASTS #/AREA URNS AUTO: ABNORMAL /LPF
KETONES UR STRIP.AUTO-MCNC: NEGATIVE MG/DL
LEUKOCYTE ESTERASE UR QL STRIP.AUTO: ABNORMAL
MUCOUS THREADS #/AREA URNS AUTO: ABNORMAL /LPF
NITRITE UR QL STRIP.AUTO: NEGATIVE
PH UR STRIP.AUTO: 5.5 [PH]
PROT UR STRIP.AUTO-MCNC: ABNORMAL MG/DL
RBC # UR STRIP.AUTO: ABNORMAL /UL
RBC #/AREA URNS AUTO: ABNORMAL /HPF
SP GR UR STRIP.AUTO: 1.03
SQUAMOUS #/AREA URNS AUTO: ABNORMAL /HPF
UROBILINOGEN UR STRIP.AUTO-MCNC: NORMAL MG/DL
WBC #/AREA URNS AUTO: ABNORMAL /HPF

## 2024-04-18 DIAGNOSIS — N34.2 INFECTIVE URETHRITIS: Primary | ICD-10-CM

## 2024-04-18 LAB — BACTERIA UR CULT: ABNORMAL

## 2024-05-14 ENCOUNTER — APPOINTMENT (OUTPATIENT)
Dept: ORTHOPEDIC SURGERY | Facility: CLINIC | Age: 69
End: 2024-05-14
Payer: MEDICARE

## 2024-05-21 ENCOUNTER — OFFICE VISIT (OUTPATIENT)
Dept: ORTHOPEDIC SURGERY | Facility: CLINIC | Age: 69
End: 2024-05-21
Payer: MEDICARE

## 2024-05-21 DIAGNOSIS — Z96.659 STATUS POST TOTAL KNEE REPLACEMENT, UNSPECIFIED LATERALITY: ICD-10-CM

## 2024-05-21 PROCEDURE — 1036F TOBACCO NON-USER: CPT | Performed by: STUDENT IN AN ORGANIZED HEALTH CARE EDUCATION/TRAINING PROGRAM

## 2024-05-21 PROCEDURE — 1160F RVW MEDS BY RX/DR IN RCRD: CPT | Performed by: STUDENT IN AN ORGANIZED HEALTH CARE EDUCATION/TRAINING PROGRAM

## 2024-05-21 PROCEDURE — 1157F ADVNC CARE PLAN IN RCRD: CPT | Performed by: STUDENT IN AN ORGANIZED HEALTH CARE EDUCATION/TRAINING PROGRAM

## 2024-05-21 PROCEDURE — 99024 POSTOP FOLLOW-UP VISIT: CPT | Performed by: STUDENT IN AN ORGANIZED HEALTH CARE EDUCATION/TRAINING PROGRAM

## 2024-05-21 PROCEDURE — 3008F BODY MASS INDEX DOCD: CPT | Performed by: STUDENT IN AN ORGANIZED HEALTH CARE EDUCATION/TRAINING PROGRAM

## 2024-05-21 PROCEDURE — 1159F MED LIST DOCD IN RCRD: CPT | Performed by: STUDENT IN AN ORGANIZED HEALTH CARE EDUCATION/TRAINING PROGRAM

## 2024-05-21 PROCEDURE — 1123F ACP DISCUSS/DSCN MKR DOCD: CPT | Performed by: STUDENT IN AN ORGANIZED HEALTH CARE EDUCATION/TRAINING PROGRAM

## 2024-05-21 RX ORDER — METHYLPREDNISOLONE 4 MG/1
TABLET ORAL
Qty: 21 TABLET | Refills: 0 | Status: SHIPPED | OUTPATIENT
Start: 2024-05-21

## 2024-05-21 NOTE — PROGRESS NOTES
History of Present Illness:  Patient returns today endorsing mild pain.  Patient notes improving functional status.    Physical Examination:  Well healed incision   ROM: 0-115  No laxity to varus / valgus stress  + Plantar/dorsiflexion  Negative Brenda test    Assessment:  Patient status post right total knee arthroplasty, doing well    Plan:  Discussed analgesics. Encouraged MDP for swelling and pain after a small twistin injury.   Reviewed range of motion, strength goals.  Discussed activities to avoid  Dental prophylaxis discussed.  Follow-up:  2 months    Isaias Rivera PA-C

## 2024-06-10 ENCOUNTER — PROCEDURE VISIT (OUTPATIENT)
Dept: OBSTETRICS AND GYNECOLOGY | Facility: CLINIC | Age: 69
End: 2024-06-10
Payer: MEDICARE

## 2024-06-10 VITALS
BODY MASS INDEX: 40.48 KG/M2 | SYSTOLIC BLOOD PRESSURE: 116 MMHG | HEIGHT: 62 IN | WEIGHT: 220 LBS | DIASTOLIC BLOOD PRESSURE: 70 MMHG

## 2024-06-10 DIAGNOSIS — N39.41 URGE INCONTINENCE: ICD-10-CM

## 2024-06-10 DIAGNOSIS — N39.3 SUI (STRESS URINARY INCONTINENCE, FEMALE): Primary | ICD-10-CM

## 2024-06-10 DIAGNOSIS — N32.81 OAB (OVERACTIVE BLADDER): ICD-10-CM

## 2024-06-10 DIAGNOSIS — N81.6 PELVIC ORGAN PROLAPSE QUANTIFICATION STAGE 3 RECTOCELE: ICD-10-CM

## 2024-06-10 PROCEDURE — 99213 OFFICE O/P EST LOW 20 MIN: CPT | Performed by: NURSE PRACTITIONER

## 2024-06-10 NOTE — PROGRESS NOTES
"HISTORY OF PRESENT ILLNESS:  Billie Walsh is a 68 y.o. female, who presents in follow up for CHRISTINA with pessary management.      During last encounter on 3/11/2024, reviewed and agreed to the following:    CHRISTINA: #5 ring with support and knob placed  Rectocele stage III: asymptomatic but treated with pessary for CHRISTINA benefit  Constipation: resolved with daily fiber  OAB/UUI: minimally bothersome compared to CHRISTINA, focused on lifestyle management    Today she reports   - Pessary no longer in place, though it was minimally helpful when it was in place  - She did note a decrease in CHRISTINA when she was in PT after knee replacement, but she hasn't kept up with exercises and has lost that progress  - She is interested in procedure to manage CHRISTINA          PHYSICAL EXAMINATION:  No LMP recorded. Patient is postmenopausal.  Body mass index is 40.24 kg/m².  /70   Ht 1.575 m (5' 2\")   Wt 99.8 kg (220 lb)   BMI 40.24 kg/m²     Physical Exam  Constitutional:       Appearance: Normal appearance. She is normal weight.   Pulmonary:      Effort: Pulmonary effort is normal.   Neurological:      Mental Status: She is alert.   Psychiatric:         Mood and Affect: Mood normal.         Behavior: Behavior normal.         Thought Content: Thought content normal.         Judgment: Judgment normal.       Urine dip:  No results found for this or any previous visit.     IMPRESSION AND PLAN:  Billie Walsh is a 68 y.o. who is being treated for mixed incontinence, stage III rectocele and constipation.     Plan    CHRISTINA  Patient would like to pursue surgical management of CHRISTINA. We discussed MUS vs. Bulking and she in primarily interested in MUS.  PI sheet provided for patient on MUS  Negative CST on exam with NPV so will complete UDS  OAB/UUI  Minimally bothersome, she is aware that MUS will not improve OAB  Rectocele  Asymptomatic   Constipation  Resolved with fiber supplementation    Follow up with urodynamics and pre-op visit in person " with Dr. Jimenez            All questions and concerns were answered and addressed.  The patient expressed understanding and agrees with the plan.

## 2024-06-21 ENCOUNTER — APPOINTMENT (OUTPATIENT)
Dept: PRIMARY CARE | Facility: CLINIC | Age: 69
End: 2024-06-21
Payer: MEDICARE

## 2024-06-21 VITALS
TEMPERATURE: 97.8 F | RESPIRATION RATE: 18 BRPM | OXYGEN SATURATION: 96 % | WEIGHT: 212.2 LBS | SYSTOLIC BLOOD PRESSURE: 122 MMHG | DIASTOLIC BLOOD PRESSURE: 62 MMHG | HEART RATE: 68 BPM | BODY MASS INDEX: 39.05 KG/M2 | HEIGHT: 62 IN

## 2024-06-21 DIAGNOSIS — E66.01 MORBID (SEVERE) OBESITY DUE TO EXCESS CALORIES (MULTI): Primary | ICD-10-CM

## 2024-06-21 DIAGNOSIS — I10 HYPERTENSION, UNSPECIFIED TYPE: ICD-10-CM

## 2024-06-21 DIAGNOSIS — E88.810 METABOLIC SYNDROME: ICD-10-CM

## 2024-06-21 PROBLEM — M81.0 SENILE OSTEOPOROSIS: Status: ACTIVE | Noted: 2024-06-21

## 2024-06-21 PROCEDURE — 1036F TOBACCO NON-USER: CPT | Performed by: INTERNAL MEDICINE

## 2024-06-21 PROCEDURE — 3074F SYST BP LT 130 MM HG: CPT | Performed by: INTERNAL MEDICINE

## 2024-06-21 PROCEDURE — 3008F BODY MASS INDEX DOCD: CPT | Performed by: INTERNAL MEDICINE

## 2024-06-21 PROCEDURE — 1123F ACP DISCUSS/DSCN MKR DOCD: CPT | Performed by: INTERNAL MEDICINE

## 2024-06-21 PROCEDURE — 1159F MED LIST DOCD IN RCRD: CPT | Performed by: INTERNAL MEDICINE

## 2024-06-21 PROCEDURE — 99213 OFFICE O/P EST LOW 20 MIN: CPT | Performed by: INTERNAL MEDICINE

## 2024-06-21 PROCEDURE — 1157F ADVNC CARE PLAN IN RCRD: CPT | Performed by: INTERNAL MEDICINE

## 2024-06-21 PROCEDURE — 1160F RVW MEDS BY RX/DR IN RCRD: CPT | Performed by: INTERNAL MEDICINE

## 2024-06-21 PROCEDURE — 3078F DIAST BP <80 MM HG: CPT | Performed by: INTERNAL MEDICINE

## 2024-06-21 RX ORDER — ATENOLOL 25 MG/1
25 TABLET ORAL NIGHTLY
Qty: 90 TABLET | Refills: 3 | Status: SHIPPED | OUTPATIENT
Start: 2024-06-21 | End: 2025-06-16

## 2024-06-21 RX ORDER — AMLODIPINE BESYLATE 10 MG/1
10 TABLET ORAL NIGHTLY
Qty: 90 TABLET | Refills: 3 | Status: SHIPPED | OUTPATIENT
Start: 2024-06-21 | End: 2025-06-21

## 2024-06-21 RX ORDER — METFORMIN HYDROCHLORIDE 500 MG/1
500 TABLET ORAL NIGHTLY
Qty: 90 TABLET | Refills: 3 | Status: SHIPPED | OUTPATIENT
Start: 2024-06-21 | End: 2025-06-16

## 2024-06-21 ASSESSMENT — PATIENT HEALTH QUESTIONNAIRE - PHQ9
SUM OF ALL RESPONSES TO PHQ9 QUESTIONS 1 AND 2: 0
2. FEELING DOWN, DEPRESSED OR HOPELESS: NOT AT ALL
1. LITTLE INTEREST OR PLEASURE IN DOING THINGS: NOT AT ALL

## 2024-06-21 ASSESSMENT — ENCOUNTER SYMPTOMS
NAUSEA: 0
DIARRHEA: 0
ABDOMINAL PAIN: 0
SLEEP DISTURBANCE: 0
ARTHRALGIAS: 0
WHEEZING: 0
WEAKNESS: 0
CONSTIPATION: 0
CHILLS: 0
DYSURIA: 0
DIZZINESS: 0
ADENOPATHY: 0
SORE THROAT: 0
CONFUSION: 0
FATIGUE: 0
SHORTNESS OF BREATH: 0
UNEXPECTED WEIGHT CHANGE: 0
CHEST TIGHTNESS: 0
JOINT SWELLING: 0
COUGH: 0
VOMITING: 0
PALPITATIONS: 0

## 2024-06-21 NOTE — PATIENT INSTRUCTIONS
Was nice seeing you today.  Continue same medication.  Have lab work done before next appointment if labs were ordered today.  Fu in 6 month/prn  Call/ contact our office with any concerns.    If you have labs or test done and you can't see the report in your chart or you didn't here from us in 2 weeks after test/labs done , please, call our office for reports.  Please , do not assume that they were normal.  More the 15 minutes were spent counseling on diet and exercise intervantion to address obesity and weight reduction  Body max Index (BMI)    Your body mass index (BMI) is a measure of your body fat based on your weight and height. The number that is calculated will tell you if you fall into the normal, overweight or obese category.    BMI Table    Normal weight: BMI is between 19 and 24.9  Overweight: BMI is between 25 and 29.9  Obese: BMI is 30 and above    Why is BMI important?    Being overweight or obese (BMI over 25) can exacerbate or put you at risk for getting certain diseases, like the ones listed below:    Arthritis   Asthma   Cancer  Diabetes Mellitus Type 2  Heart Attack   High blood pressure   Hypertension   Hyperlipidemia  Kidney failure   Other Lung diseases   Sleep Apnea  Stroke     How can I lose weight:     Choosing healthy foods in small portions and exercising regularly is a good way to start.    The following are a few tips:    Choose foods and snacks higher in protein and fiber.   Reduce the amount of sugary drinks (like soda) and snacks (cookies, sweets, etc)  Cut back on the amount of carbohydrates eaten daily (bread, pasta, rice, cakes)  Drink at least 8 glasses of water per day - sometimes thirst feels like hunger - stay hydrated  Chew your food slowly to savor the taste and allow the signal that you are full to register in your brain    Exercise/Activity    Exercise improves your blood flow and circulation, enhances your mood and can you to maintain or lose weight.  A brisk walk for 30  mins or longer 4-5 times per week is recommended but you can also use DVD's at home such as Walk Away the Pounds, Olivia exercises, Yoga and others to get some variety.    When do I need a referral?     If you have tried all of the above and have not lost any weight, then you should ask your provider for a referral to a dietician or medical weight  who can help you reach your goals for being at your ideal body weight.

## 2024-06-21 NOTE — PROGRESS NOTES
Subjective   Billie Walsh is a 68 y.o. female who presents for Follow-up (3 months follow up).  3 months follow  up   HTN,  metabolic syndrome, checking  BP  at home sometimes  Labs  done 3.2024 and 4.2024 ordered by  another dr, during the time of knee sx   Urine tests done -4.2024, had pessary but is out do to bleeding, has fu with uro/gyn  PA for  vegovy was denied by insurance - 3.2024  Had right knee sx  3 month ago, still has some pain      Review of Systems   Constitutional:  Negative for chills, fatigue and unexpected weight change.        Comment   HENT:  Negative for congestion, ear pain and sore throat.    Respiratory:  Negative for cough, chest tightness, shortness of breath and wheezing.    Cardiovascular:  Negative for palpitations and leg swelling.   Gastrointestinal:  Negative for abdominal pain, constipation, diarrhea, nausea and vomiting.   Genitourinary:  Negative for dysuria and urgency.   Musculoskeletal:  Negative for arthralgias and joint swelling.   Skin:  Negative for rash.   Neurological:  Negative for dizziness and weakness.   Hematological:  Negative for adenopathy.   Psychiatric/Behavioral:  Negative for confusion and sleep disturbance.        Objective   Physical Exam  Constitutional:       Appearance: Normal appearance.   HENT:      Head: Normocephalic and atraumatic.   Eyes:      Pupils: Pupils are equal, round, and reactive to light.   Cardiovascular:      Rate and Rhythm: Normal rate and regular rhythm.   Pulmonary:      Effort: Pulmonary effort is normal.      Breath sounds: Normal breath sounds.   Musculoskeletal:         General: Normal range of motion.      Cervical back: Normal range of motion and neck supple.   Skin:     General: Skin is warm.   Neurological:      General: No focal deficit present.      Mental Status: She is alert and oriented to person, place, and time.   Psychiatric:         Mood and Affect: Mood normal.         Behavior: Behavior normal.       /62  "(BP Location: Left arm, Patient Position: Sitting)   Pulse 68   Temp 36.6 °C (97.8 °F)   Resp 18   Ht 1.575 m (5' 2\")   Wt 96.3 kg (212 lb 3.2 oz)   SpO2 96%   BMI 38.81 kg/m²       Assessment/Plan   Problem List Items Addressed This Visit       Metabolic syndrome    Morbid (severe) obesity due to excess calories (Multi) - Primary     Other Visit Diagnoses       Hypertension, unspecified type                "

## 2024-06-24 ENCOUNTER — APPOINTMENT (OUTPATIENT)
Dept: OBSTETRICS AND GYNECOLOGY | Facility: CLINIC | Age: 69
End: 2024-06-24
Payer: MEDICARE

## 2024-06-24 DIAGNOSIS — N39.3 STRESS INCONTINENCE OF URINE: ICD-10-CM

## 2024-06-24 PROCEDURE — 51797 INTRAABDOMINAL PRESSURE TEST: CPT | Performed by: STUDENT IN AN ORGANIZED HEALTH CARE EDUCATION/TRAINING PROGRAM

## 2024-06-24 PROCEDURE — 51741 ELECTRO-UROFLOWMETRY FIRST: CPT | Performed by: STUDENT IN AN ORGANIZED HEALTH CARE EDUCATION/TRAINING PROGRAM

## 2024-06-24 PROCEDURE — 51729 CYSTOMETROGRAM W/VP&UP: CPT | Performed by: STUDENT IN AN ORGANIZED HEALTH CARE EDUCATION/TRAINING PROGRAM

## 2024-06-24 PROCEDURE — 51798 US URINE CAPACITY MEASURE: CPT | Performed by: STUDENT IN AN ORGANIZED HEALTH CARE EDUCATION/TRAINING PROGRAM

## 2024-06-24 PROCEDURE — 51784 ANAL/URINARY MUSCLE STUDY: CPT | Performed by: STUDENT IN AN ORGANIZED HEALTH CARE EDUCATION/TRAINING PROGRAM

## 2024-06-24 NOTE — PROGRESS NOTES
Patient ID: Billie Walsh is a 68 y.o. female.    Uroflowmetry    Date/Time: 6/24/2024 3:13 PM    Performed by: Cassidy Lechuga MA  Authorized by: Liss Jimenez MD    Procedure discussed: discussed risks, benefits and alternatives    Chaperone present: no    Timeout: timeout called immediately prior to procedure    Position: sitting    Procedure Details     Procedure: CMG with UPP/voiding pressures, EMG, intra-abdominal voiding study and uroflow      CMG with UPP/Voiding Pressures Details:     First urge to void (mL): 173    Urgency to void (mL): 194    Bladder capacity (mL): 303    Intra-abdominal Voiding Study Details:     Rectal catheter placed to record intra-abdominal pressure: yes      Uroflow Details:     Pre-void volume (mL): 117.5    Voiding duration (sec): 6.6    Average flow rate (mL/sec): 17.8    Max flow rate (mL/sec): 45.6    Voided urine (mL): 313    Residual urine (mL): 0    Post-Procedure Details     Outcome: patient tolerated procedure well with no complications      Additional Details      + stress incontinence    Urodynamics Results    Uroflowmetry:   Maximum Flow Rate: 45.4 ml/s   Average Flow: 17.8 ml/s   Volume Voided: 117.5 ml   Post void residual: 5 ml    Cystometry:   First desire: 173 ml   P detrusor: -10 cm H2O   Strong desire: 194  ml   P detrusor: -10 cm H2O  Bladder Capacity: 303 ml.   End filling detrusor pressure: -10 cm H2O   Bladder sensation: normal sensation    Detrusor activity: negative DO  Compliance: normal  CHRISITNA: +cough leak at 195 ml  Urethral pressure profile (UPP): fair  Maximum urethral closure pressure: 29 cm H2O    Voiding Study:   Maximum flow: 52 ml/s   Average flow: 26.1 ml/s   P detrusor at peak flow: 53.5 cm H2O   Volume voided: 313.9 ml   Pattern:  bell curve   Mechanism of voiding: detrusor contraction  Detrusor contraction with void: good    UDS INTERPRETATION    Uroflow: normal voided volume, appropriate flow pattern, PVR 5 ml    CMG: normal compliance,  normal sensation, negative DO, + CHRISTINA    Voiding: normal flow pattern, normal detrusor pressure, normal voiding mechanism    SUMMARY:  +CHRISTINA, candidate for sling

## 2024-07-08 ENCOUNTER — APPOINTMENT (OUTPATIENT)
Dept: OBSTETRICS AND GYNECOLOGY | Facility: CLINIC | Age: 69
End: 2024-07-08
Payer: MEDICARE

## 2024-07-08 ENCOUNTER — PREP FOR PROCEDURE (OUTPATIENT)
Dept: OBSTETRICS AND GYNECOLOGY | Facility: CLINIC | Age: 69
End: 2024-07-08

## 2024-07-08 VITALS
WEIGHT: 213 LBS | DIASTOLIC BLOOD PRESSURE: 72 MMHG | SYSTOLIC BLOOD PRESSURE: 132 MMHG | BODY MASS INDEX: 39.2 KG/M2 | HEIGHT: 62 IN

## 2024-07-08 DIAGNOSIS — N39.3 SUI (STRESS URINARY INCONTINENCE, FEMALE): Primary | ICD-10-CM

## 2024-07-08 PROCEDURE — 1123F ACP DISCUSS/DSCN MKR DOCD: CPT | Performed by: STUDENT IN AN ORGANIZED HEALTH CARE EDUCATION/TRAINING PROGRAM

## 2024-07-08 PROCEDURE — 3078F DIAST BP <80 MM HG: CPT | Performed by: STUDENT IN AN ORGANIZED HEALTH CARE EDUCATION/TRAINING PROGRAM

## 2024-07-08 PROCEDURE — 1159F MED LIST DOCD IN RCRD: CPT | Performed by: STUDENT IN AN ORGANIZED HEALTH CARE EDUCATION/TRAINING PROGRAM

## 2024-07-08 PROCEDURE — 99214 OFFICE O/P EST MOD 30 MIN: CPT | Performed by: STUDENT IN AN ORGANIZED HEALTH CARE EDUCATION/TRAINING PROGRAM

## 2024-07-08 PROCEDURE — 1036F TOBACCO NON-USER: CPT | Performed by: STUDENT IN AN ORGANIZED HEALTH CARE EDUCATION/TRAINING PROGRAM

## 2024-07-08 PROCEDURE — 1157F ADVNC CARE PLAN IN RCRD: CPT | Performed by: STUDENT IN AN ORGANIZED HEALTH CARE EDUCATION/TRAINING PROGRAM

## 2024-07-08 PROCEDURE — 3008F BODY MASS INDEX DOCD: CPT | Performed by: STUDENT IN AN ORGANIZED HEALTH CARE EDUCATION/TRAINING PROGRAM

## 2024-07-08 PROCEDURE — 3075F SYST BP GE 130 - 139MM HG: CPT | Performed by: STUDENT IN AN ORGANIZED HEALTH CARE EDUCATION/TRAINING PROGRAM

## 2024-07-08 PROCEDURE — 1126F AMNT PAIN NOTED NONE PRSNT: CPT | Performed by: STUDENT IN AN ORGANIZED HEALTH CARE EDUCATION/TRAINING PROGRAM

## 2024-07-08 RX ORDER — GABAPENTIN 600 MG/1
600 TABLET ORAL ONCE
OUTPATIENT
Start: 2024-07-08 | End: 2024-07-08

## 2024-07-08 RX ORDER — CEFAZOLIN SODIUM 2 G/100ML
2 INJECTION, SOLUTION INTRAVENOUS ONCE
OUTPATIENT
Start: 2024-07-08 | End: 2024-07-08

## 2024-07-08 RX ORDER — CELECOXIB 400 MG/1
400 CAPSULE ORAL ONCE
OUTPATIENT
Start: 2024-07-08 | End: 2024-07-08

## 2024-07-08 RX ORDER — PHENAZOPYRIDINE HYDROCHLORIDE 200 MG/1
200 TABLET, FILM COATED ORAL ONCE
OUTPATIENT
Start: 2024-07-08 | End: 2024-07-08

## 2024-07-08 RX ORDER — SODIUM CHLORIDE, SODIUM LACTATE, POTASSIUM CHLORIDE, CALCIUM CHLORIDE 600; 310; 30; 20 MG/100ML; MG/100ML; MG/100ML; MG/100ML
100 INJECTION, SOLUTION INTRAVENOUS CONTINUOUS
OUTPATIENT
Start: 2024-07-08

## 2024-07-08 RX ORDER — ACETAMINOPHEN 325 MG/1
975 TABLET ORAL ONCE
OUTPATIENT
Start: 2024-07-08 | End: 2024-07-08

## 2024-07-08 ASSESSMENT — PAIN SCALES - GENERAL: PAINLEVEL: 0-NO PAIN

## 2024-07-08 NOTE — PROGRESS NOTES
Pt here for 2 week follow up regarding UDS     Chaperone declined: Myrna Karimi, CM3      HISTORY OF PRESENT ILLNESS:  Billie Walsh is a 68 y.o. female, who presents in follow up for CHRISTINA.    During last encounter on 6/10/24 ([place]), reviewed and agreed to the following:  CHRISTINA  Patient would like to pursue surgical management of CHRISTINA. We discussed MUS vs. Bulking and she in primarily interested in MUS.  PI sheet provided for patient on MUS  Negative CST on exam with NPV so will complete UDS  OAB/UUI  Minimally bothersome, she is aware that MUS will not improve OAB  Rectocele  Asymptomatic   Constipation  Resolved with fiber supplementation       Today she reports   Interested in sling.     The following were reviewed to gain additional history:  External notes: Dr. Fountain (PCP) 6/21/24, follow up on HTN, metabolic syndrome  Test results:   A1C 5.6% 3/6/24    UDS INTERPRETATION     Uroflow: normal voided volume, appropriate flow pattern, PVR 5 ml     CMG: normal compliance, normal sensation, negative DO, + CHRISTINA     Voiding: normal flow pattern, normal detrusor pressure, normal voiding mechanism     SUMMARY:  +CHRISTINA, candidate for sling          PHYSICAL EXAMINATION:  No LMP recorded. Patient is postmenopausal.  There is no height or weight on file to calculate BMI.  There were no vitals taken for this visit.    General Appearance: well appearing  Neuro: Alert and oriented   HEENT: mucous membranes moist, neck supple  Resp: No respiratory distress, normal work of breathing  MSK: normal range of motion, gait appropriate    Pelvic: deferred    IMPRESSION AND PLAN:  Billie Walsh is a 68 y.o. who presents in follow up for CHRISTINA.    CHRISTINA  - s/p pessary attempt, minimally helpful when in place  - Reviewed options for surgical management of CHRISTINA: midurethral sling versus periurethral bulking injections and steps of each procedure  - Counseled on success rates of each: sling ~95% successful and bulkamid 60-70% success  -  Reviewed risks/benefits of each option. Sling carries the benefit of higher success rate but has 2 week recovery period with no lifting over 10 pounds, and involves mesh which carries a 2-4% risk of mesh exposure. Bulking carries lower success rate but has no recovery period and no mesh risk involved.  - Counseled on management of mesh exposure if that should happen: often managed with vaginal estrogen in the office and rarely requires return to OR for mesh excision  - Reviewed risk of urinary retention requiring indwelling catheter temporarily which would be removed in the office following surgery  - s/p UDS as above, confirming CHRISTINA  - will need PCP clearance from [place] for TIFFANY    Interested in sling, cystoscopy. Ok with [place] or [place]    All questions and concerns were answered and addressed.  The patient expressed understanding and agrees with the plan.     Liss Jimenez MD

## 2024-07-15 ENCOUNTER — TELEPHONE (OUTPATIENT)
Dept: OBSTETRICS AND GYNECOLOGY | Facility: CLINIC | Age: 69
End: 2024-07-15
Payer: MEDICARE

## 2024-07-15 PROBLEM — N39.3 SUI (STRESS URINARY INCONTINENCE, FEMALE): Status: ACTIVE | Noted: 2024-07-08

## 2024-07-15 NOTE — TELEPHONE ENCOUNTER
Call to patient to schedule surgery with Dr Jimenez at [place]. Patient agrees with date of 8/1/2024 for a mid-urethral sling and cystoscopy. CPM has been ordered. Patient instructed to get medical clearance from PCP per Dr Jimenez's request.

## 2024-07-16 ENCOUNTER — TELEPHONE (OUTPATIENT)
Dept: OBSTETRICS AND GYNECOLOGY | Facility: CLINIC | Age: 69
End: 2024-07-16
Payer: MEDICARE

## 2024-07-16 NOTE — TELEPHONE ENCOUNTER
Insurance co contacted to inquire if PA needed for surgery scheduled 8/1/24 with dr resendez.  S/w dannie.  Office was informed pts  secondary medicare supplement plan requires no PA, as it accepts whatever medicare accepts.  Ref# [id number] was given.

## 2024-07-23 ENCOUNTER — OFFICE VISIT (OUTPATIENT)
Dept: ORTHOPEDIC SURGERY | Facility: CLINIC | Age: 69
End: 2024-07-23
Payer: MEDICARE

## 2024-07-23 DIAGNOSIS — G89.18 POST-OP PAIN: Primary | ICD-10-CM

## 2024-07-23 PROCEDURE — 1157F ADVNC CARE PLAN IN RCRD: CPT | Performed by: STUDENT IN AN ORGANIZED HEALTH CARE EDUCATION/TRAINING PROGRAM

## 2024-07-23 PROCEDURE — 99213 OFFICE O/P EST LOW 20 MIN: CPT | Performed by: STUDENT IN AN ORGANIZED HEALTH CARE EDUCATION/TRAINING PROGRAM

## 2024-07-23 PROCEDURE — 1159F MED LIST DOCD IN RCRD: CPT | Performed by: STUDENT IN AN ORGANIZED HEALTH CARE EDUCATION/TRAINING PROGRAM

## 2024-07-23 PROCEDURE — 1036F TOBACCO NON-USER: CPT | Performed by: STUDENT IN AN ORGANIZED HEALTH CARE EDUCATION/TRAINING PROGRAM

## 2024-07-23 PROCEDURE — 1123F ACP DISCUSS/DSCN MKR DOCD: CPT | Performed by: STUDENT IN AN ORGANIZED HEALTH CARE EDUCATION/TRAINING PROGRAM

## 2024-07-23 RX ORDER — MELOXICAM 15 MG/1
15 TABLET ORAL
Qty: 30 TABLET | Refills: 0 | Status: SHIPPED | OUTPATIENT
Start: 2024-07-23

## 2024-07-23 NOTE — PROGRESS NOTES
History of Present Illness:  Patient returns today endorsing minimal pain.  Patient notes improving functional status. She still has some burning pains on occasion.     Physical Examination:  Well healed incision   ROM: 0-115  No laxity to varus / valgus stress  + Plantar/dorsiflexion  Negative Brenda test    Assessment:  Patient status post right total knee arthroplasty, doing well    Plan:  Discussed improvement in strength, swelling over the course of the first year post op.  Discussed return to activities and activities to avoid.  Dental prophylaxis discussed.  Follow-up:  Not needed at this time     Isaias Rivera PA-C

## 2024-07-25 ENCOUNTER — TELEPHONE (OUTPATIENT)
Dept: OBSTETRICS AND GYNECOLOGY | Facility: CLINIC | Age: 69
End: 2024-07-25
Payer: MEDICARE

## 2024-07-25 ENCOUNTER — TELEPHONE (OUTPATIENT)
Dept: PRIMARY CARE | Facility: CLINIC | Age: 69
End: 2024-07-25
Payer: MEDICARE

## 2024-07-25 NOTE — TELEPHONE ENCOUNTER
Spoke with Patient is aware of appt. Tomorrow with Georgia Cohen for medical Clearance for cytoscopy 08.01.24.

## 2024-07-26 ENCOUNTER — LAB (OUTPATIENT)
Dept: LAB | Facility: LAB | Age: 69
End: 2024-07-26
Payer: MEDICARE

## 2024-07-26 ENCOUNTER — OFFICE VISIT (OUTPATIENT)
Dept: PRIMARY CARE | Facility: CLINIC | Age: 69
End: 2024-07-26
Payer: MEDICARE

## 2024-07-26 VITALS
SYSTOLIC BLOOD PRESSURE: 125 MMHG | DIASTOLIC BLOOD PRESSURE: 79 MMHG | OXYGEN SATURATION: 95 % | HEIGHT: 62 IN | WEIGHT: 216 LBS | HEART RATE: 72 BPM | BODY MASS INDEX: 39.75 KG/M2 | RESPIRATION RATE: 18 BRPM

## 2024-07-26 DIAGNOSIS — Z01.818 PRE-OPERATIVE CLEARANCE: ICD-10-CM

## 2024-07-26 DIAGNOSIS — Z86.79 HX OF PRIMARY HYPERTENSION: ICD-10-CM

## 2024-07-26 DIAGNOSIS — R31.21 ASYMPTOMATIC MICROSCOPIC HEMATURIA: ICD-10-CM

## 2024-07-26 DIAGNOSIS — I10 PRIMARY HYPERTENSION: ICD-10-CM

## 2024-07-26 DIAGNOSIS — Z01.810 PRE-OPERATIVE CARDIOVASCULAR EXAMINATION, RECENT MI (CMS/HCC): ICD-10-CM

## 2024-07-26 DIAGNOSIS — G47.33 OSA (OBSTRUCTIVE SLEEP APNEA): ICD-10-CM

## 2024-07-26 DIAGNOSIS — I21.9 PRE-OPERATIVE CARDIOVASCULAR EXAMINATION, RECENT MI (CMS/HCC): ICD-10-CM

## 2024-07-26 DIAGNOSIS — N39.3 STRESS INCONTINENCE OF URINE: Primary | ICD-10-CM

## 2024-07-26 LAB
ANION GAP SERPL CALC-SCNC: 13 MMOL/L (ref 10–20)
APTT PPP: 31 SECONDS (ref 27–38)
BUN SERPL-MCNC: 15 MG/DL (ref 6–23)
CALCIUM SERPL-MCNC: 9.6 MG/DL (ref 8.6–10.3)
CHLORIDE SERPL-SCNC: 105 MMOL/L (ref 98–107)
CO2 SERPL-SCNC: 25 MMOL/L (ref 21–32)
CREAT SERPL-MCNC: 0.65 MG/DL (ref 0.5–1.05)
EGFRCR SERPLBLD CKD-EPI 2021: >90 ML/MIN/1.73M*2
ERYTHROCYTE [DISTWIDTH] IN BLOOD BY AUTOMATED COUNT: 13.5 % (ref 11.5–14.5)
GLUCOSE SERPL-MCNC: 95 MG/DL (ref 74–99)
HCT VFR BLD AUTO: 41.5 % (ref 36–46)
HGB BLD-MCNC: 13 G/DL (ref 12–16)
INR PPP: 1.1 (ref 0.9–1.1)
MAGNESIUM SERPL-MCNC: 2.13 MG/DL (ref 1.6–2.4)
MCH RBC QN AUTO: 28.1 PG (ref 26–34)
MCHC RBC AUTO-ENTMCNC: 31.3 G/DL (ref 32–36)
MCV RBC AUTO: 90 FL (ref 80–100)
NRBC BLD-RTO: 0 /100 WBCS (ref 0–0)
PLATELET # BLD AUTO: 300 X10*3/UL (ref 150–450)
POTASSIUM SERPL-SCNC: 4.3 MMOL/L (ref 3.5–5.3)
PROTHROMBIN TIME: 12 SECONDS (ref 9.8–12.8)
RBC # BLD AUTO: 4.62 X10*6/UL (ref 4–5.2)
SODIUM SERPL-SCNC: 139 MMOL/L (ref 136–145)
WBC # BLD AUTO: 8.6 X10*3/UL (ref 4.4–11.3)

## 2024-07-26 PROCEDURE — 1159F MED LIST DOCD IN RCRD: CPT | Performed by: NURSE PRACTITIONER

## 2024-07-26 PROCEDURE — 1160F RVW MEDS BY RX/DR IN RCRD: CPT | Performed by: NURSE PRACTITIONER

## 2024-07-26 PROCEDURE — 85730 THROMBOPLASTIN TIME PARTIAL: CPT

## 2024-07-26 PROCEDURE — 1036F TOBACCO NON-USER: CPT | Performed by: NURSE PRACTITIONER

## 2024-07-26 PROCEDURE — 99213 OFFICE O/P EST LOW 20 MIN: CPT | Performed by: NURSE PRACTITIONER

## 2024-07-26 PROCEDURE — 80048 BASIC METABOLIC PNL TOTAL CA: CPT

## 2024-07-26 PROCEDURE — 1126F AMNT PAIN NOTED NONE PRSNT: CPT | Performed by: NURSE PRACTITIONER

## 2024-07-26 PROCEDURE — 36415 COLL VENOUS BLD VENIPUNCTURE: CPT

## 2024-07-26 PROCEDURE — 85610 PROTHROMBIN TIME: CPT

## 2024-07-26 PROCEDURE — 3074F SYST BP LT 130 MM HG: CPT | Performed by: NURSE PRACTITIONER

## 2024-07-26 PROCEDURE — 1157F ADVNC CARE PLAN IN RCRD: CPT | Performed by: NURSE PRACTITIONER

## 2024-07-26 PROCEDURE — 85027 COMPLETE CBC AUTOMATED: CPT

## 2024-07-26 PROCEDURE — 83735 ASSAY OF MAGNESIUM: CPT

## 2024-07-26 PROCEDURE — 3008F BODY MASS INDEX DOCD: CPT | Performed by: NURSE PRACTITIONER

## 2024-07-26 PROCEDURE — 1123F ACP DISCUSS/DSCN MKR DOCD: CPT | Performed by: NURSE PRACTITIONER

## 2024-07-26 PROCEDURE — 3078F DIAST BP <80 MM HG: CPT | Performed by: NURSE PRACTITIONER

## 2024-07-26 ASSESSMENT — ENCOUNTER SYMPTOMS
WHEEZING: 0
STRIDOR: 0
HEMATOLOGIC/LYMPHATIC NEGATIVE: 1
APNEA: 0
EYES NEGATIVE: 1
SHORTNESS OF BREATH: 0
CARDIOVASCULAR NEGATIVE: 1
NEUROLOGICAL NEGATIVE: 1
GASTROINTESTINAL NEGATIVE: 1
PALPITATIONS: 0
RESPIRATORY NEGATIVE: 1
CHEST TIGHTNESS: 0
COUGH: 0
ALLERGIC/IMMUNOLOGIC NEGATIVE: 1
MUSCULOSKELETAL NEGATIVE: 1
ENDOCRINE NEGATIVE: 1
CONSTITUTIONAL NEGATIVE: 1
CHOKING: 0
PSYCHIATRIC NEGATIVE: 1

## 2024-07-26 ASSESSMENT — PAIN SCALES - GENERAL: PAINLEVEL: 0-NO PAIN

## 2024-07-26 NOTE — ASSESSMENT & PLAN NOTE
Wears CPAP every single night and wears anywhere for 4-6 hours- uses it 70% of the time.   Will require monitoring by anesthesia during and after surgery

## 2024-07-26 NOTE — PROGRESS NOTES
Subjective   Patient ID: Billie Walsh is a 68 y.o. female who presents for clearance for surgery.    Patient is here to get clearance for minor surgery on 08/01/2024.   Patient is going to have a cystoscopy Dr. Jimenez with  is her urologist : Note IMPRESSION AND PLAN:  Billie Walsh is a 68 y.o. who presents in follow up for CHRISTINA.     CHRISTINA  - s/p pessary attempt, minimally helpful when in place  - Reviewed options for surgical management of CHRISTINA: midurethral sling versus periurethral bulking injections and steps of each procedure  - Counseled on success rates of each: sling ~95% successful and bulkamid 60-70% success  - Reviewed risks/benefits of each option. Sling carries the benefit of higher success rate but has 2 week recovery period with no lifting over 10 pounds, and involves mesh which carries a 2-4% risk of mesh exposure. Bulking carries lower success rate but has no recovery period and no mesh risk involved.  - Counseled on management of mesh exposure if that should happen: often managed with vaginal estrogen in the office and rarely requires return to OR for mesh excision  - Reviewed risk of urinary retention requiring indwelling catheter temporarily which would be removed in the office following surgery  - s/p UDS as above, confirming CHRISTINA  - will need PCP clearance from [facility] for TIFFANY     Interested in sling, cystoscopy. Ok with [place] or [place]     Subjective  Billie Walsh is a 68 y.o. female who presents to the office today for a preoperative consultation at the request of surgeon Dr. Jimenez who plans on performing Cysto with bladder sling on August 1. This consultation is requested for the specific conditions prompting preoperative evaluation (i.e. because of potential effect on operative risk): Obstructve sleep apnea. Planned anesthesia: general. The patient has the following known anesthesia issues: no problems with anesthesia does get nauseated post sedation nausea will need treated .  Patients bleeding risk: use of Ca-channel blockers (see med list). Patient does not have objections to receiving blood products if needed.    Predictors of intubation difficulty:   Morbid obesity? yes - BMI 39.51   Anatomically abnormal facies? no   Prominent incisors? no   Receding mandible? no   Short, thick neck? no   Neck range of motion: normal   Mallampati score: IV (only hard palate visible)   Thyromental distance: > 6cm ()   Dentition: No chipped, loose, or missing teeth.    Cardiographics  ECG: normal sinus rhythm, no blocks or conduction defects, no ischemic changes  Normal sinus rhythm  Normal ECG  When compared with ECG of 29-DEC-2010 07:17,  Questionable change in QRS axis    Imaging  Chest x-ray: CT CHEST FOR CARDIAC SCORING NORMAL LUNGS      Lab Review   Lab on 04/16/2024  Color, Urine              Value: Yellow             Dt: 04/16/2024  Appearance, Urine         Value: Turbid (N)         Dt: 04/16/2024  Specific Gravity, Urine   Value: 1.029              Dt: 04/16/2024  pH, Urine                 Value: 5.5                Dt: 04/16/2024  Protein, Urine            Value: 30 (1+)(mg/dL) (A) Dt: 04/16/2024  Glucose, Urine            Value: Normal(mg/dL)      Dt: 04/16/2024  Blood, Urine              Value: 0.2 (2+) (A)       Dt: 04/16/2024  Ketones, Urine            Value: NEGATIVE(mg/dL)    Dt: 04/16/2024  Bilirubin, Urine          Value: NEGATIVE           Dt: 04/16/2024  Urobilinogen, Urine       Value: Normal(mg/dL)      Dt: 04/16/2024  Nitrite, Urine            Value: NEGATIVE           Dt: 04/16/2024  Leukocyte Esterase, Urine Value: 500 González/µL (A)     Dt: 04/16/2024  Urine Culture                                       Dt: 04/16/2024                  Value: Multiple organisms present, probable contaminatio*   WBC, Urine                Value: 21-50(/HPF) (A)    Dt: 04/16/2024  RBC, Urine                Value: 6-10(/HPF) (A)     Dt: 04/16/2024  Squamous Epithelial Cell*                            Dt: 04/16/2024                  Value: 1-9 (SPARSE)   Mucus, Urine              Value: 2+(/LPF)           Dt: 04/16/2024  Hyaline Casts, Urine      Value: 1+(/LPF) (A)       Dt: 04/16/2024  ------------      Assessment/Plan  68 y.o. female with planned surgery as above.    Known risk factors for perioperative complications: Anemia    Difficulty with intubation is not anticipated.    Cardiac Risk Estimation: low    Current medications which may produce withdrawal symptoms if withheld perioperatively:   Current Outpatient Medications:   ·  amLODIPine (Norvasc) 10 mg tablet, Take 1 tablet (10 mg) by mouth once daily at bedtime., Disp: 90 tablet, Rfl: 3  ·  atenolol (Tenormin) 25 mg tablet, Take 1 tablet (25 mg) by mouth once daily at bedtime., Disp: 90 tablet, Rfl: 3  ·  cholecalciferol (Vitamin D-3) 25 MCG (1000 UT) tablet, Take 1 tablet (25 mcg) by mouth once daily., Disp: , Rfl:   ·  meloxicam (Mobic) 15 mg tablet, Take 1 tablet (15 mg) by mouth once daily with breakfast., Disp: 30 tablet, Rfl: 0  ·  metFORMIN (Glucophage) 500 mg tablet, Take 1 tablet (500 mg) by mouth once daily at bedtime., Disp: 90 tablet, Rfl: 3  ·  pantoprazole (ProtoNix) 40 mg EC tablet, Take 1 tablet (40 mg) by mouth once daily., Disp: 90 each, Rfl: 2      1. Preoperative workup as follows hemoglobin, hematocrit, electrolytes.  2. Change in medication regimen before surgery: discontinue NSAIDs (Meloxicam and Ibupofren) 14 days before surgery.  3. Prophylaxis for cardiac events with perioperative beta-blockers: take atenolol prior to surgery.  4. Invasive hemodynamic monitoring perioperatively: at the discretion of anesthesiologist.  5. Deep vein thrombosis prophylaxis postoperatively:regimen to be chosen by surgical team.  6. Surveillance for postoperative MI with ECG immediately postoperatively and on postoperative days 1 and 2 AND troponin levels 24 hours postoperatively and on day 4 or hospital discharge (whichever comes first): at the  "discretion of anesthesiologist.  7. Other measures: patient is stable for surgery         Review of Systems   Constitutional: Negative.    HENT: Negative.     Eyes: Negative.    Respiratory: Negative.  Negative for apnea, cough, choking, chest tightness, shortness of breath, wheezing and stridor.    Cardiovascular: Negative.  Negative for chest pain, palpitations and leg swelling.   Gastrointestinal: Negative.    Endocrine: Negative.    Genitourinary: Negative.    Musculoskeletal: Negative.    Skin: Negative.    Allergic/Immunologic: Negative.    Neurological: Negative.    Hematological: Negative.    Psychiatric/Behavioral: Negative.         Objective   /79   Pulse 72   Resp 18   Ht 1.575 m (5' 2\")   Wt 98 kg (216 lb)   SpO2 95%   BMI 39.51 kg/m²     Physical Exam  Vitals and nursing note reviewed.   Constitutional:       Appearance: Normal appearance.   HENT:      Head: Normocephalic and atraumatic.      Nose: Nose normal.      Mouth/Throat:      Mouth: Mucous membranes are moist.   Eyes:      Pupils: Pupils are equal, round, and reactive to light.   Neck:      Thyroid: No thyroid mass, thyromegaly or thyroid tenderness.      Vascular: Normal carotid pulses. No carotid bruit, hepatojugular reflux or JVD.      Trachea: No tracheal tenderness, tracheostomy, abnormal tracheal secretions or tracheal deviation.      Meningeal: Brudzinski's sign and Kernig's sign absent.   Cardiovascular:      Rate and Rhythm: Normal rate and regular rhythm.      Pulses: Normal pulses.      Heart sounds: Normal heart sounds. No murmur heard.     No friction rub. No gallop.   Pulmonary:      Effort: Pulmonary effort is normal. No respiratory distress.      Breath sounds: Normal breath sounds. No stridor. No wheezing, rhonchi or rales.   Chest:      Chest wall: No tenderness.   Abdominal:      Palpations: Abdomen is soft.   Musculoskeletal:         General: Normal range of motion.      Cervical back: Normal range of motion and " neck supple. No edema, erythema, signs of trauma, rigidity, torticollis or crepitus. No pain with movement, spinous process tenderness or muscular tenderness. Normal range of motion.   Lymphadenopathy:      Cervical: No cervical adenopathy.      Right cervical: No superficial, deep or posterior cervical adenopathy.     Left cervical: No superficial, deep or posterior cervical adenopathy.   Skin:     Capillary Refill: Capillary refill takes 2 to 3 seconds.   Neurological:      General: No focal deficit present.      Mental Status: She is alert and oriented to person, place, and time.      Cranial Nerves: Cranial nerves 2-12 are intact.      Sensory: Sensation is intact.      Motor: Motor function is intact.      Deep Tendon Reflexes: Reflexes are normal and symmetric.         Assessment/Plan   Problem List Items Addressed This Visit             ICD-10-CM    Primary hypertension I10     Well controlled on atenolol, lisinopril and amlodipine          TIFFANY (obstructive sleep apnea) G47.33     Wears CPAP every single night and wears anywhere for 4-6 hours- uses it 70% of the time.   Will require monitoring by anesthesia during and after surgery          Stress incontinence of urine - Primary N39.3     Scheduled for cystoscopy          Other Visit Diagnoses         Codes    Pre-operative clearance     Z01.818    Relevant Orders    Protime-INR    aPTT    CBC    Basic metabolic panel    Hx of primary hypertension     Z86.79    Relevant Orders    Protime-INR    Pre-operative cardiovascular examination, recent MI (CMS/[facility])     Z01.810, I21.9    Relevant Orders    aPTT        Patient is cleared for urologic  surgery from general medicine standpoint. Low risk for adverse cardiovascular or pulmonary health outcome.

## 2024-07-31 NOTE — PREPROCEDURE INSTRUCTIONS
Current Medications   Medication Instructions    amLODIPine (Norvasc) 10 mg tablet Continue until night before surgery    atenolol (Tenormin) 25 mg tablet Continue until night before surgery    cholecalciferol (Vitamin D-3) 25 MCG (1000 UT) tablet Hold     meloxicam (Mobic) 15 mg tablet Hold    metFORMIN (Glucophage) 500 mg tablet Continue until night before surgery    pantoprazole (ProtoNix) 40 mg EC tablet Continue until night before surgery            NPO Instructions:    Do not eat any food after midnight the night before your surgery/procedure.  You may have up to 13.5 ounces of clear liquids until TWO hours before instructed ARRIVAL time. This includes water, black tea/coffee, (no milk or cream) apple juice and electrolyte drinks (Gatorade).    Additional Instructions:     Day of Surgery: Arrive at 12:00 PM for 1:30 PM surgery    Enter through the main entrance of [facility], located at [address] [facility]. Proceed to registration, located in the back right hand corner. You will need your ID and insurance card for registration. Please ensure you have a responsible adult to drive you home.     Take a shower the morning of or night before your procedure. After you shower avoid lotions, powders, deodorants or anything applied to the skin. If you wear contacts or glasses, wear the glasses. If you do not have glasses, please bring a case for your contacts. You may wear hearing aids and dentures, bring a case for them or we will provide one. Make sure you wear something loose and comfortable. Keep in mind your surgery type and wear something that will accommodate incisions or bandages. Please remove all jewelry.     For further questions Melisa ROGERS can be contacted at [phone] between 7AM-3PM.

## 2024-08-01 ENCOUNTER — ANESTHESIA EVENT (OUTPATIENT)
Dept: OPERATING ROOM | Facility: HOSPITAL | Age: 69
End: 2024-08-01
Payer: MEDICARE

## 2024-08-01 ENCOUNTER — ANESTHESIA (OUTPATIENT)
Dept: OPERATING ROOM | Facility: HOSPITAL | Age: 69
End: 2024-08-01
Payer: MEDICARE

## 2024-08-01 ENCOUNTER — HOSPITAL ENCOUNTER (OUTPATIENT)
Facility: HOSPITAL | Age: 69
Setting detail: OUTPATIENT SURGERY
Discharge: HOME | End: 2024-08-01
Attending: STUDENT IN AN ORGANIZED HEALTH CARE EDUCATION/TRAINING PROGRAM | Admitting: STUDENT IN AN ORGANIZED HEALTH CARE EDUCATION/TRAINING PROGRAM
Payer: MEDICARE

## 2024-08-01 VITALS
SYSTOLIC BLOOD PRESSURE: 130 MMHG | DIASTOLIC BLOOD PRESSURE: 63 MMHG | OXYGEN SATURATION: 98 % | TEMPERATURE: 97.7 F | RESPIRATION RATE: 16 BRPM | HEART RATE: 62 BPM

## 2024-08-01 DIAGNOSIS — N39.3 SUI (STRESS URINARY INCONTINENCE, FEMALE): Primary | ICD-10-CM

## 2024-08-01 LAB — GLUCOSE BLD MANUAL STRIP-MCNC: 90 MG/DL (ref 74–99)

## 2024-08-01 PROCEDURE — 2720000007 HC OR 272 NO HCPCS: Performed by: STUDENT IN AN ORGANIZED HEALTH CARE EDUCATION/TRAINING PROGRAM

## 2024-08-01 PROCEDURE — 2500000004 HC RX 250 GENERAL PHARMACY W/ HCPCS (ALT 636 FOR OP/ED): Performed by: NURSE ANESTHETIST, CERTIFIED REGISTERED

## 2024-08-01 PROCEDURE — 2500000004 HC RX 250 GENERAL PHARMACY W/ HCPCS (ALT 636 FOR OP/ED): Performed by: ANESTHESIOLOGY

## 2024-08-01 PROCEDURE — 2500000005 HC RX 250 GENERAL PHARMACY W/O HCPCS: Performed by: NURSE ANESTHETIST, CERTIFIED REGISTERED

## 2024-08-01 PROCEDURE — 2500000002 HC RX 250 W HCPCS SELF ADMINISTERED DRUGS (ALT 637 FOR MEDICARE OP, ALT 636 FOR OP/ED): Performed by: STUDENT IN AN ORGANIZED HEALTH CARE EDUCATION/TRAINING PROGRAM

## 2024-08-01 PROCEDURE — 7100000001 HC RECOVERY ROOM TIME - INITIAL BASE CHARGE: Performed by: STUDENT IN AN ORGANIZED HEALTH CARE EDUCATION/TRAINING PROGRAM

## 2024-08-01 PROCEDURE — 2500000004 HC RX 250 GENERAL PHARMACY W/ HCPCS (ALT 636 FOR OP/ED): Performed by: STUDENT IN AN ORGANIZED HEALTH CARE EDUCATION/TRAINING PROGRAM

## 2024-08-01 PROCEDURE — 7100000002 HC RECOVERY ROOM TIME - EACH INCREMENTAL 1 MINUTE: Performed by: STUDENT IN AN ORGANIZED HEALTH CARE EDUCATION/TRAINING PROGRAM

## 2024-08-01 PROCEDURE — 2500000001 HC RX 250 WO HCPCS SELF ADMINISTERED DRUGS (ALT 637 FOR MEDICARE OP): Performed by: STUDENT IN AN ORGANIZED HEALTH CARE EDUCATION/TRAINING PROGRAM

## 2024-08-01 PROCEDURE — 3600000003 HC OR TIME - INITIAL BASE CHARGE - PROCEDURE LEVEL THREE: Performed by: STUDENT IN AN ORGANIZED HEALTH CARE EDUCATION/TRAINING PROGRAM

## 2024-08-01 PROCEDURE — 3700000001 HC GENERAL ANESTHESIA TIME - INITIAL BASE CHARGE: Performed by: STUDENT IN AN ORGANIZED HEALTH CARE EDUCATION/TRAINING PROGRAM

## 2024-08-01 PROCEDURE — 7100000009 HC PHASE TWO TIME - INITIAL BASE CHARGE: Performed by: STUDENT IN AN ORGANIZED HEALTH CARE EDUCATION/TRAINING PROGRAM

## 2024-08-01 PROCEDURE — 82947 ASSAY GLUCOSE BLOOD QUANT: CPT

## 2024-08-01 PROCEDURE — 57288 REPAIR BLADDER DEFECT: CPT | Performed by: STUDENT IN AN ORGANIZED HEALTH CARE EDUCATION/TRAINING PROGRAM

## 2024-08-01 PROCEDURE — 3600000008 HC OR TIME - EACH INCREMENTAL 1 MINUTE - PROCEDURE LEVEL THREE: Performed by: STUDENT IN AN ORGANIZED HEALTH CARE EDUCATION/TRAINING PROGRAM

## 2024-08-01 PROCEDURE — 3700000002 HC GENERAL ANESTHESIA TIME - EACH INCREMENTAL 1 MINUTE: Performed by: STUDENT IN AN ORGANIZED HEALTH CARE EDUCATION/TRAINING PROGRAM

## 2024-08-01 PROCEDURE — 7100000010 HC PHASE TWO TIME - EACH INCREMENTAL 1 MINUTE: Performed by: STUDENT IN AN ORGANIZED HEALTH CARE EDUCATION/TRAINING PROGRAM

## 2024-08-01 PROCEDURE — 2500000005 HC RX 250 GENERAL PHARMACY W/O HCPCS: Performed by: STUDENT IN AN ORGANIZED HEALTH CARE EDUCATION/TRAINING PROGRAM

## 2024-08-01 RX ORDER — LIDOCAINE HYDROCHLORIDE 10 MG/ML
0.1 INJECTION INFILTRATION; PERINEURAL ONCE
Status: DISCONTINUED | OUTPATIENT
Start: 2024-08-01 | End: 2024-08-01 | Stop reason: HOSPADM

## 2024-08-01 RX ORDER — FENTANYL CITRATE 50 UG/ML
INJECTION, SOLUTION INTRAMUSCULAR; INTRAVENOUS AS NEEDED
Status: DISCONTINUED | OUTPATIENT
Start: 2024-08-01 | End: 2024-08-01

## 2024-08-01 RX ORDER — PROCHLORPERAZINE EDISYLATE 5 MG/ML
5 INJECTION INTRAMUSCULAR; INTRAVENOUS ONCE AS NEEDED
Status: DISCONTINUED | OUTPATIENT
Start: 2024-08-01 | End: 2024-08-01 | Stop reason: HOSPADM

## 2024-08-01 RX ORDER — LIDOCAINE HCL/PF 100 MG/5ML
SYRINGE (ML) INTRAVENOUS AS NEEDED
Status: DISCONTINUED | OUTPATIENT
Start: 2024-08-01 | End: 2024-08-01

## 2024-08-01 RX ORDER — SODIUM CHLORIDE 9 MG/ML
INJECTION, SOLUTION INTRAMUSCULAR; INTRAVENOUS; SUBCUTANEOUS AS NEEDED
Status: DISCONTINUED | OUTPATIENT
Start: 2024-08-01 | End: 2024-08-01 | Stop reason: HOSPADM

## 2024-08-01 RX ORDER — ROCURONIUM BROMIDE 10 MG/ML
INJECTION, SOLUTION INTRAVENOUS AS NEEDED
Status: DISCONTINUED | OUTPATIENT
Start: 2024-08-01 | End: 2024-08-01

## 2024-08-01 RX ORDER — SODIUM CHLORIDE, SODIUM LACTATE, POTASSIUM CHLORIDE, CALCIUM CHLORIDE 600; 310; 30; 20 MG/100ML; MG/100ML; MG/100ML; MG/100ML
100 INJECTION, SOLUTION INTRAVENOUS CONTINUOUS
Status: DISCONTINUED | OUTPATIENT
Start: 2024-08-01 | End: 2024-08-01 | Stop reason: HOSPADM

## 2024-08-01 RX ORDER — MIDAZOLAM HYDROCHLORIDE 1 MG/ML
INJECTION, SOLUTION INTRAMUSCULAR; INTRAVENOUS AS NEEDED
Status: DISCONTINUED | OUTPATIENT
Start: 2024-08-01 | End: 2024-08-01

## 2024-08-01 RX ORDER — GABAPENTIN 300 MG/1
600 CAPSULE ORAL ONCE
Status: COMPLETED | OUTPATIENT
Start: 2024-08-01 | End: 2024-08-01

## 2024-08-01 RX ORDER — PHENAZOPYRIDINE HYDROCHLORIDE 200 MG/1
200 TABLET, FILM COATED ORAL ONCE
Status: COMPLETED | OUTPATIENT
Start: 2024-08-01 | End: 2024-08-01

## 2024-08-01 RX ORDER — PROPOFOL 10 MG/ML
INJECTION, EMULSION INTRAVENOUS AS NEEDED
Status: DISCONTINUED | OUTPATIENT
Start: 2024-08-01 | End: 2024-08-01

## 2024-08-01 RX ORDER — CEFAZOLIN 1 G/1
INJECTION, POWDER, FOR SOLUTION INTRAVENOUS AS NEEDED
Status: DISCONTINUED | OUTPATIENT
Start: 2024-08-01 | End: 2024-08-01

## 2024-08-01 RX ORDER — ACETAMINOPHEN 325 MG/1
650 TABLET ORAL EVERY 4 HOURS PRN
Status: DISCONTINUED | OUTPATIENT
Start: 2024-08-01 | End: 2024-08-01 | Stop reason: HOSPADM

## 2024-08-01 RX ORDER — MEPERIDINE HYDROCHLORIDE 25 MG/ML
12.5 INJECTION INTRAMUSCULAR; INTRAVENOUS; SUBCUTANEOUS EVERY 10 MIN PRN
Status: DISCONTINUED | OUTPATIENT
Start: 2024-08-01 | End: 2024-08-01 | Stop reason: HOSPADM

## 2024-08-01 RX ORDER — CELECOXIB 100 MG/1
400 CAPSULE ORAL ONCE
Status: COMPLETED | OUTPATIENT
Start: 2024-08-01 | End: 2024-08-01

## 2024-08-01 RX ORDER — SODIUM CHLORIDE 0.9 G/100ML
IRRIGANT IRRIGATION AS NEEDED
Status: DISCONTINUED | OUTPATIENT
Start: 2024-08-01 | End: 2024-08-01 | Stop reason: HOSPADM

## 2024-08-01 RX ORDER — ALBUTEROL SULFATE 0.83 MG/ML
2.5 SOLUTION RESPIRATORY (INHALATION) ONCE AS NEEDED
Status: DISCONTINUED | OUTPATIENT
Start: 2024-08-01 | End: 2024-08-01 | Stop reason: HOSPADM

## 2024-08-01 RX ORDER — IPRATROPIUM BROMIDE 0.5 MG/2.5ML
500 SOLUTION RESPIRATORY (INHALATION) ONCE
Status: DISCONTINUED | OUTPATIENT
Start: 2024-08-01 | End: 2024-08-01 | Stop reason: HOSPADM

## 2024-08-01 RX ORDER — ACETAMINOPHEN 325 MG/1
975 TABLET ORAL ONCE
Status: COMPLETED | OUTPATIENT
Start: 2024-08-01 | End: 2024-08-01

## 2024-08-01 RX ORDER — ONDANSETRON HYDROCHLORIDE 2 MG/ML
4 INJECTION, SOLUTION INTRAVENOUS ONCE AS NEEDED
Status: DISCONTINUED | OUTPATIENT
Start: 2024-08-01 | End: 2024-08-01 | Stop reason: HOSPADM

## 2024-08-01 RX ORDER — CEFAZOLIN SODIUM 2 G/100ML
2 INJECTION, SOLUTION INTRAVENOUS ONCE
Status: DISCONTINUED | OUTPATIENT
Start: 2024-08-01 | End: 2024-08-01 | Stop reason: HOSPADM

## 2024-08-01 RX ORDER — KETOROLAC TROMETHAMINE 30 MG/ML
INJECTION, SOLUTION INTRAMUSCULAR; INTRAVENOUS AS NEEDED
Status: DISCONTINUED | OUTPATIENT
Start: 2024-08-01 | End: 2024-08-01

## 2024-08-01 RX ORDER — ONDANSETRON HYDROCHLORIDE 2 MG/ML
4 INJECTION, SOLUTION INTRAVENOUS ONCE AS NEEDED
Status: COMPLETED | OUTPATIENT
Start: 2024-08-01 | End: 2024-08-01

## 2024-08-01 SDOH — HEALTH STABILITY: MENTAL HEALTH: CURRENT SMOKER: 0

## 2024-08-01 ASSESSMENT — ENCOUNTER SYMPTOMS
DYSURIA: 0
FREQUENCY: 0
HEADACHES: 0
HEMATURIA: 0
COLOR CHANGE: 0
DIARRHEA: 0
FLANK PAIN: 0
DIFFICULTY URINATING: 0
WEAKNESS: 0
CHILLS: 0
VOMITING: 0
NAUSEA: 0
TREMORS: 0
SHORTNESS OF BREATH: 0
FEVER: 0

## 2024-08-01 ASSESSMENT — COLUMBIA-SUICIDE SEVERITY RATING SCALE - C-SSRS
1. IN THE PAST MONTH, HAVE YOU WISHED YOU WERE DEAD OR WISHED YOU COULD GO TO SLEEP AND NOT WAKE UP?: NO
6. HAVE YOU EVER DONE ANYTHING, STARTED TO DO ANYTHING, OR PREPARED TO DO ANYTHING TO END YOUR LIFE?: NO
2. HAVE YOU ACTUALLY HAD ANY THOUGHTS OF KILLING YOURSELF?: NO

## 2024-08-01 ASSESSMENT — PAIN SCALES - GENERAL
PAINLEVEL_OUTOF10: 0 - NO PAIN
PAIN_LEVEL: 0
PAINLEVEL_OUTOF10: 0 - NO PAIN
PAINLEVEL_OUTOF10: 0 - NO PAIN

## 2024-08-01 ASSESSMENT — PAIN - FUNCTIONAL ASSESSMENT
PAIN_FUNCTIONAL_ASSESSMENT: 0-10

## 2024-08-01 NOTE — DISCHARGE INSTRUCTIONS
UROGYNECOLOGY POST-OPERATIVE INSTRUCTIONS    GENERAL:  It is recommended that a responsible adult stay with you for 24 hours after receiving anesthesia. Do not make any important decisions, sign any important documents, or drive for about 24 hours.    FOOD:  You can eat your normal regular food.  There are no restrictions.     ACTIVITY  1. You may feel tired and weak because your body is focusing on healing and recovering from anesthesia.  2. We encourage you to walk. You will get tired quickly so take breaks when you need to. Then get up and move around again. It is important NOT to lie in bed all day. Being active throughout the recovery process is the best way to speed up your healing and avoid complications.   3. You can go outside the house.   4. You can go up and down the stairs.   5. You can shower.      PAIN:  You can use over the counter tylenol and/or ibuprofen for pain as needed  If your pain is not well controlled with these options, call the office for further guidance.     CONSTIPATION: Avoiding constipation is key after surgery!  1) Take 1 capful of Miralax daily  2) If you have not moved your bowels within 48 hours or 2 days of surgery, increase frequency of Miralax to twice a day  3) The goal is to have toothpaste consistency stools. You can decrease the amount of Miralax if you are having diarrhea.  4) If you have not had a bowel movement 5 days after surgery, call Dr. Jimenez's office.     WOUND CARE:  1.  If you had vaginal surgery, you will have light vaginal bleeding or discharge that will go on for 6 weeks or possibly longer.  You will need a light pad.  If you have heavy bleeding (enough to fully soak a pad in an hour or less), call Dr. Jimenez's office. You may also notice some clots passing particularly if you just got up from being in bed or sitting for a while - this is normal!  2. You may shower daily, no special soaps or cleansing agents.  3. Dr. Jimenez may ask you to insert estrogen  cream in the vagina using an applicator.  This is safe and will not hurt you in any way. Sometimes it can stir up a little bleeding, but it is generally not heavy.     CATHETER CARE (if you go home with a catheter):  1.  Simply empty the bag every 3-4 hours.  2. You can shower and let the catheter get wet, pat it dry with a towel.  3. Dr. Jimenez's office will call you to schedule an appointment for catheter removal. If you have not heard from them within 1-2 business days, call the office.    REASONS TO CALL US  1. If you have a fever (temperature more than 100.3 degrees). Please check your temperature prior to calling.  2. If your pain is not controlled after taking the medications as recommended above.  3. If you are vomiting and unable to hold down food or liquid.  4. If you are unable to urinate despite having an urge to do so. Similarly, if you have a catheter in place and have a strong urge to urinate but are not seeing urine draining into the bag, give us a call.  5. If you are unable to have a bowel movement despite following the recommendations listed above.    If you ever feel that something isn't right or you have concerns, please don't hesitate to call the office!    HOW TO REACH US:  [place] patients: [phone], option #2 then option #3  [place] patients: [phone], option #2 then option #3  [place] patients: [phone]

## 2024-08-01 NOTE — H&P
History Of Present Illness  Billie Walsh is a 68 y.o. female presenting with CHRISTINA, desires surgery.     Past Medical History  Past Medical History:   Diagnosis Date    Anemia     Occasionally    Arthritis     Cataract     GERD (gastroesophageal reflux disease)     Hypertension     Obesity     TIFFANY (obstructive sleep apnea)     Osteoarthritis     PONV (postoperative nausea and vomiting)     Stress incontinence     Urinary tract infection     Occasionally       Surgical History  Past Surgical History:   Procedure Laterality Date    BREAST BIOPSY      CARDIAC CATHETERIZATION      CHOLECYSTECTOMY      EYE SURGERY Right     2023    HYSTERECTOMY      KNEE ARTHROPLASTY      OTHER SURGICAL HISTORY  09/16/2019    Hysterectomy    OTHER SURGICAL HISTORY  09/16/2019    Cardiac catheterization    OTHER SURGICAL HISTORY  09/16/2019    Breast biopsy    OTHER SURGICAL HISTORY  09/16/2019    Cholecystectomy    TUBAL LIGATION      WISDOM TOOTH EXTRACTION          Social History  She reports that she quit smoking about 39 years ago. Her smoking use included cigarettes. She started smoking about 42 years ago. She has a 0.8 pack-year smoking history. She has never been exposed to tobacco smoke. She has never used smokeless tobacco. She reports that she does not currently use alcohol. She reports that she does not use drugs.    Family History  Family History   Problem Relation Name Age of Onset    Hypertension Mother Maria A     Cancer Mother Maria A     Diabetes Mother Maria A     Heart attack Mother Maria A     Heart disease Mother Maria A     Heart disease Father      Coronary artery disease Brother Hugh     Other (Colitis) Brother Hugh     Hypertension Brother Hugh     Heart disease Brother Hugh     Heart attack Other Family history     Heart disease Maternal Grandfather Felice         Allergies  Penicillins    Review of Systems   Constitutional:  Negative for chills and fever.   Respiratory:  Negative for shortness of breath.    Cardiovascular:   Negative for chest pain.   Gastrointestinal:  Negative for diarrhea, nausea and vomiting.   Endocrine: Negative for polyuria.   Genitourinary:  Negative for difficulty urinating, dysuria, flank pain, frequency, hematuria and urgency.   Skin:  Negative for color change.   Neurological:  Negative for tremors, weakness and headaches.        Physical Exam  Constitutional:       General: She is not in acute distress.     Appearance: Normal appearance.   Pulmonary:      Effort: Pulmonary effort is normal. No respiratory distress.   Abdominal:      General: There is no distension.      Palpations: Abdomen is soft.      Tenderness: There is no abdominal tenderness. There is no guarding or rebound.   Skin:     General: Skin is warm and dry.   Neurological:      General: No focal deficit present.      Mental Status: She is alert.   Psychiatric:         Mood and Affect: Mood normal.         Behavior: Behavior normal.         Thought Content: Thought content normal.         Judgment: Judgment normal.          Last Recorded Vitals  Blood pressure 147/70, pulse 71, temperature 36 °C (96.8 °F), temperature source Temporal, resp. rate 18, SpO2 97%.           Assessment/Plan   Principal Problem:    CHRISTINA (stress urinary incontinence, female)      67 y/o with CHRISTINA, planning midurethral sling, cystoscopy         Liss Jimenez MD

## 2024-08-01 NOTE — ANESTHESIA POSTPROCEDURE EVALUATION
Patient: Billie Walsh    Procedure Summary       Date: 08/01/24 Room / Location: [facility] 09 / [facility]    Anesthesia Start: 1356 Anesthesia Stop:     Procedures:       MIDURETHRAL SLING      CYSTOSCOPY Diagnosis:       CHRISTINA (stress urinary incontinence, female)      (CHRISTINA (stress urinary incontinence, female) [N39.3])    Surgeons: Liss Jimenez MD Responsible Provider: TERRIE Goss    Anesthesia Type: general ASA Status: 3            Anesthesia Type: general    Vitals Value Taken Time   /62 08/01/24 1511   Temp 36.2 08/01/24 1511   Pulse 69 08/01/24 1511   Resp 14 08/01/24 1511   SpO2 100 08/01/24 1511       Anesthesia Post Evaluation    Patient location during evaluation: PACU  Patient participation: complete - patient cannot participate  Level of consciousness: sleepy but conscious  Pain score: 0  Pain management: adequate  Multimodal analgesia pain management approach  Airway patency: patent  Cardiovascular status: acceptable  Respiratory status: acceptable  Hydration status: acceptable  Postoperative Nausea and Vomiting: none        No notable events documented.

## 2024-08-01 NOTE — OP NOTE
MIDURETHRAL SLING, CYSTOSCOPY Operative Note     Date: 2024  OR Location: [facility]    Name: Billie Walsh, : 1955, Age: 68 y.o., MRN: [id number], Sex: female    Diagnosis  Pre-op Diagnosis      * CHRITSINA (stress urinary incontinence, female) [N39.3] Post-op Diagnosis     * CHRISTINA (stress urinary incontinence, female) [N39.3]     Procedures  MIDURETHRAL SLING  28226 - ND SLING OPERATION STRESS INCONTINENCE    CYSTOSCOPY  85423 - ND CYSTOURETHROSCOPY      Surgeons      * Liss Jimenez - Primary    Resident/Fellow/Other Assistant:  Surgeons and Role:  * No surgeons found with a matching role *    Procedure Summary  Anesthesia: Anesthesia type not filed in the log.  ASA: III  Anesthesia Staff: Anesthesiologist: Jorge Jorgensen MD  CRNA: JAYCEE Langford-BLAIRE, ASIA; TERRIE Goss  Estimated Blood Loss: 10 mL  Intra-op Medications:   Administrations occurring from 1330 to 1450 on 24:   Medication Name Total Dose   sodium chloride 0.9 % irrigation solution 3,500 mL   sodium chloride (PF) 0.9% solution 50 mL   lactated Ringer's infusion 78.33 mL              Anesthesia Record               Intraprocedure I/O Totals       None           Specimen: No specimens collected     Staff:   Circulator: Claudia  Scrub Person: Ainsley  Scrub Person: Xenia         Drains and/or Catheters:   Urethral Catheter Non-latex 16 Fr. (Active)       External Urinary Catheter (Active)       Tourniquet Times:         Implants:     Findings: see below    Indications: Billie Walsh is an 68 y.o. female who is having surgery for CHRISTINA (stress urinary incontinence, female) [N39.3].     The patient was seen in the preoperative area. The risks, benefits, complications, treatment options, non-operative alternatives, expected recovery and outcomes were discussed with the patient. The possibilities of reaction to medication, pulmonary aspiration, injury to surrounding structures, bleeding, recurrent infection, the need for  additional procedures, failure to diagnose a condition, and creating a complication requiring transfusion or operation were discussed with the patient. The patient concurred with the proposed plan, giving informed consent.  The site of surgery was properly noted/marked if necessary per policy. The patient has been actively warmed in preoperative area. Preoperative antibiotics have been ordered and given within 1 hours of incision. Venous thrombosis prophylaxis have been ordered including bilateral sequential compression devices    Procedure Details:     Preoperative diagnosis:  Stress urinary incontinence    Postoperative diagnosis:  Stress urinary incontinence    Operation:  Midurethral sling  Cystoscopy    Attending surgeon:  Liss Jimenez MD    Findings:  Cystoscopy:  Ureteral patency: demonstrated  Bladder integrity: no evidence of injury    Indication:   Symptomatic stress urinary incontinence  Desires surgical management     Narrative:  General anesthetic was administered and the patient was placed in the dorsal lithotomy position in  candy cane stirrups and prepped and draped in the normal sterile fashion.       Sling:  Two Allis clamps were placed on the anterior vaginal mucosa at the level of the bladder neck and 1 cm from the urethral meatus, respectively. Vasopressin was infiltrated in each periurethral space. A midline incision was made between the two Allises using a scalpel. The vaginal mucosa was dissected from the underlying pubocervical fascia to the level of the inferior pubic rami bilaterally. With the bladder empty the Trae retropubic sling needle with Desara mesh attached was advanced through the endopelvic fascia, the space of Retzius and the abdominal wall to a premarked spot. The same procedure was carried out on the contralateral side. Care was taken to avoid bladder injury and cystoscopy showed the absence of bladder penetration. The cystoscopy demonstrated efflux of urine from both  ureteral orifices and normal bladder.The sling mesh was adjusted so that it rested underneath the midportion of the urethra in a tension free manner. The plastic sleeves were carefully removed while the suburethral portion of the sling was stabilized.  The suprapubic incisions were closed with skin glue and the remainder of the anterior vaginal wall was closed with a 3-0 Vicryl stitch.    I was present and scrubbed for the entire procedure and performed all critical aspects of the procedure myself.    Liss Jimenez MD    Complications:  None; patient tolerated the procedure well.    Disposition: PACU - hemodynamically stable.  Condition: stable         Attending Attestation:     Liss Jimenez  Phone Number: [phone]

## 2024-08-01 NOTE — ANESTHESIA PREPROCEDURE EVALUATION
Patient: Billie Walsh    Procedure Information       Date/Time: 08/01/24 1330    Procedures:       MIDURETHRAL SLING      CYSTOSCOPY - MID-URETHRAL SLING AND CYSTOSCOPY    Location: [facility] / [facility]    Surgeons: Liss Jimenez MD            Relevant Problems   Cardiac   (+) Arrhythmia   (+) Hyperlipemia   (+) Hypertension   (+) Primary hypertension      Pulmonary   (+) TIFFANY (obstructive sleep apnea)      Neuro   (+) CTS (carpal tunnel syndrome)   (+) Cervical radiculopathy, chronic   (+) Depression      GI   (+) Dysphagia   (+) GERD (gastroesophageal reflux disease)      Endocrine   (+) Morbid (severe) obesity due to excess calories (Multi)      Musculoskeletal   (+) CTS (carpal tunnel syndrome)   (+) Osteoarthritis   (+) Primary osteoarthritis of both knees   (+) Unilateral primary osteoarthritis, right knee      HEENT   (+) Sinusitis      ID   (+) Paronychia of toe of left foot       Clinical information reviewed:    Allergies  Meds  Problems    OB Status           NPO Detail:  NPO/Void Status  Date of Last Liquid: 08/01/24  Time of Last Liquid: 0830  Date of Last Solid: 07/31/24  Time of Last Solid: 2030         Physical Exam    Airway  Mallampati: III  TM distance: <3 FB  Neck ROM: full     Cardiovascular - normal exam  Rhythm: regular  Rate: normal     Dental - normal exam     Pulmonary - normal exam     Abdominal        Anesthesia Plan    History of general anesthesia?: yes  History of complications of general anesthesia?: no    ASA 3     general     The patient is not a current smoker.  Education provided regarding risk of obstructive sleep apnea.  intravenous induction   Postoperative administration of opioids is intended.  Trial extubation is planned.  Anesthetic plan and risks discussed with patient.    Plan discussed with CRNA.

## 2024-08-01 NOTE — ANESTHESIA PROCEDURE NOTES
Airway  Date/Time: 8/1/2024 2:11 PM  Urgency: elective      Staffing  Performed: BLAIRE   Authorized by: TERRIE Langford DNP    Performed by: TERRIE Langford DNP  Patient location during procedure: pre-op    Indications and Patient Condition  Indications for airway management: anesthesia and airway protection  Spontaneous ventilation: present  Sedation level: deep  Preoxygenated: yes  Patient position: sniffing  MILS maintained throughout  Mask difficulty assessment: 1 - vent by mask  No planned trial extubation    Final Airway Details  Final airway type: endotracheal airway      Successful airway: ETT  Cuffed: yes   Successful intubation technique: video laryngoscopy  Endotracheal tube insertion site: oral  Blade: Fred  Blade size: #3  ETT size (mm): 7.0  Cormack-Lehane Classification: grade I - full view of glottis  Placement verified by: chest auscultation and capnometry   Cuff volume (mL): 10  Measured from: lips  ETT to lips (cm): 21  Number of attempts at approach: 1  Ventilation between attempts: none  Number of other approaches attempted: 0

## 2024-08-02 NOTE — TELEPHONE ENCOUNTER
"Call to patient for follow up from surgery with Dr Jimenez yesterday. She states that she is very tired with \"a little pain\". Sounds groggy on phone call. Taking tylenol and ibuprofen for pain. Eating very little; toast and crackers. She said that she was very nauseous and vomiting yesterday. I instructed her to increase water/ fluids, because she may be feeling bad from dehydration. She agrees to try. I told her that I would call her tomorrow to check on how she is doing. I asked if anyone was with her and she said no. I asked if someone could come and help her if she needed assistance and she said yes.   "

## 2024-08-03 ENCOUNTER — TELEPHONE (OUTPATIENT)
Dept: OBSTETRICS AND GYNECOLOGY | Facility: CLINIC | Age: 69
End: 2024-08-03
Payer: MEDICARE

## 2024-08-03 NOTE — TELEPHONE ENCOUNTER
"Call to patient for follow up from surgery 8/1. She states that pain level is 5. \"More uncomfortable than yesterday\"; using tylenol and ibuprofen. Has had a \"small\" bowel movement. Told to take miralax in AM and PM. I told her that I will check back with her tomorrow. She commented that \"urinating is different\". I told her that that is to be expected. Asked if she feels like she is emptying her bladder; and she says yes.   "

## 2024-08-04 ENCOUNTER — TELEPHONE (OUTPATIENT)
Dept: OBSTETRICS AND GYNECOLOGY | Facility: CLINIC | Age: 69
End: 2024-08-04
Payer: MEDICARE

## 2024-08-04 NOTE — TELEPHONE ENCOUNTER
Call to patient for follow up from surgery  8/1. She sounds much better today. States that she was outside [employer] [place]. Is only taking ibuprofen for pain. Having normal bowel movements now. No complaints or concerns.

## 2024-08-19 ENCOUNTER — APPOINTMENT (OUTPATIENT)
Dept: OBSTETRICS AND GYNECOLOGY | Facility: CLINIC | Age: 69
End: 2024-08-19
Payer: MEDICARE

## 2024-08-19 VITALS
WEIGHT: 212 LBS | HEIGHT: 62 IN | SYSTOLIC BLOOD PRESSURE: 126 MMHG | BODY MASS INDEX: 39.01 KG/M2 | DIASTOLIC BLOOD PRESSURE: 70 MMHG

## 2024-08-19 DIAGNOSIS — N39.3 SUI (STRESS URINARY INCONTINENCE, FEMALE): ICD-10-CM

## 2024-08-19 PROCEDURE — 3008F BODY MASS INDEX DOCD: CPT | Performed by: STUDENT IN AN ORGANIZED HEALTH CARE EDUCATION/TRAINING PROGRAM

## 2024-08-19 PROCEDURE — 1159F MED LIST DOCD IN RCRD: CPT | Performed by: STUDENT IN AN ORGANIZED HEALTH CARE EDUCATION/TRAINING PROGRAM

## 2024-08-19 PROCEDURE — 3078F DIAST BP <80 MM HG: CPT | Performed by: STUDENT IN AN ORGANIZED HEALTH CARE EDUCATION/TRAINING PROGRAM

## 2024-08-19 PROCEDURE — 99024 POSTOP FOLLOW-UP VISIT: CPT | Performed by: STUDENT IN AN ORGANIZED HEALTH CARE EDUCATION/TRAINING PROGRAM

## 2024-08-19 PROCEDURE — 1123F ACP DISCUSS/DSCN MKR DOCD: CPT | Performed by: STUDENT IN AN ORGANIZED HEALTH CARE EDUCATION/TRAINING PROGRAM

## 2024-08-19 PROCEDURE — 1126F AMNT PAIN NOTED NONE PRSNT: CPT | Performed by: STUDENT IN AN ORGANIZED HEALTH CARE EDUCATION/TRAINING PROGRAM

## 2024-08-19 PROCEDURE — 3074F SYST BP LT 130 MM HG: CPT | Performed by: STUDENT IN AN ORGANIZED HEALTH CARE EDUCATION/TRAINING PROGRAM

## 2024-08-19 PROCEDURE — 1157F ADVNC CARE PLAN IN RCRD: CPT | Performed by: STUDENT IN AN ORGANIZED HEALTH CARE EDUCATION/TRAINING PROGRAM

## 2024-08-19 PROCEDURE — 1036F TOBACCO NON-USER: CPT | Performed by: STUDENT IN AN ORGANIZED HEALTH CARE EDUCATION/TRAINING PROGRAM

## 2024-08-19 ASSESSMENT — PAIN SCALES - GENERAL: PAINLEVEL: 0-NO PAIN

## 2024-08-19 NOTE — PROGRESS NOTES
2 week POV     Chaperone declined: Myrna Karimi, CM3      POST OPERATIVE VISIT    The patient is a 68 y.o. female who presents for a postoperative follow up visit.    She underwent TVT, cysto on 8/1/24 and is now 2 week(s)  post-op.      INTERVAL HISTORY:  Still having occasional leakage, sometimes with urgency.     Prolapse symptoms: No    Urinary Incontinence symptoms:       Stress incontinence: Occasional       Urgency: Yes       Frequency: No       Urge incontinence: Only if she waits too long    Voiding dysfunction symptoms: No    Defecatory symptoms: No    Fecal Incontinence: No    Pain: improved now    PHYSICAL EXAM:  There were no vitals taken for this visit.  PVR (by ultrasound): 15 ml    General Appearance: well developed, good nutrition, well groomed, no deformities, normal habitus  Head: normocephalic, and atraumatic  Neck: symmetric, midline trachea,  full range of motion  Respiratory: no dyspnea, negative for intercostal retraction or uses of accessory muscles of respiration  Cardiovascular: peripheral pulses are normal, no swelling, edema or varicosities  Abdomen: Abdomen soft, non-tender, non-distended.  Incisions well-healed     Pelvic:  CST:  negative  External genitalia: normal appearance, normal Bartholin's glands and [place]'s glands   Urethra: normal meatus, non-tender, no periurethral mass,   no evidence of exposed mesh  Vaginal mucosa: normal, no granulation tissue, no strictures,  incisions well-healed,  no evidence of exposed mesh  Atrophy   No    POP-Q (in supine position):  No significant prolapse        Impression/plan:  68 y.o. female with history of CHRISTINA who underwent TVT, sling 8/1/24    She is doing well postoperatively.    - She is healing normally  - Preoperative symptoms improved, some residual CHRISTINA and sensation of incomplete emptying  - counseled on voiding strategy post sling  -  continue pelvic rest but ok to return to lifting, other activities  - Return in 4 week(s)    Liss RUIZ  MD Tony

## 2024-09-16 ENCOUNTER — APPOINTMENT (OUTPATIENT)
Dept: OBSTETRICS AND GYNECOLOGY | Facility: CLINIC | Age: 69
End: 2024-09-16
Payer: MEDICARE

## 2024-09-16 VITALS
WEIGHT: 211 LBS | BODY MASS INDEX: 38.83 KG/M2 | HEIGHT: 62 IN | SYSTOLIC BLOOD PRESSURE: 130 MMHG | DIASTOLIC BLOOD PRESSURE: 82 MMHG

## 2024-09-16 DIAGNOSIS — Z87.448 HISTORY OF STRESS INCONTINENCE: Primary | ICD-10-CM

## 2024-09-16 DIAGNOSIS — N32.81 OAB (OVERACTIVE BLADDER): ICD-10-CM

## 2024-09-16 PROCEDURE — 3079F DIAST BP 80-89 MM HG: CPT | Performed by: STUDENT IN AN ORGANIZED HEALTH CARE EDUCATION/TRAINING PROGRAM

## 2024-09-16 PROCEDURE — 1157F ADVNC CARE PLAN IN RCRD: CPT | Performed by: STUDENT IN AN ORGANIZED HEALTH CARE EDUCATION/TRAINING PROGRAM

## 2024-09-16 PROCEDURE — 1126F AMNT PAIN NOTED NONE PRSNT: CPT | Performed by: STUDENT IN AN ORGANIZED HEALTH CARE EDUCATION/TRAINING PROGRAM

## 2024-09-16 PROCEDURE — 1123F ACP DISCUSS/DSCN MKR DOCD: CPT | Performed by: STUDENT IN AN ORGANIZED HEALTH CARE EDUCATION/TRAINING PROGRAM

## 2024-09-16 PROCEDURE — 3075F SYST BP GE 130 - 139MM HG: CPT | Performed by: STUDENT IN AN ORGANIZED HEALTH CARE EDUCATION/TRAINING PROGRAM

## 2024-09-16 PROCEDURE — 1036F TOBACCO NON-USER: CPT | Performed by: STUDENT IN AN ORGANIZED HEALTH CARE EDUCATION/TRAINING PROGRAM

## 2024-09-16 PROCEDURE — 1159F MED LIST DOCD IN RCRD: CPT | Performed by: STUDENT IN AN ORGANIZED HEALTH CARE EDUCATION/TRAINING PROGRAM

## 2024-09-16 PROCEDURE — 99024 POSTOP FOLLOW-UP VISIT: CPT | Performed by: STUDENT IN AN ORGANIZED HEALTH CARE EDUCATION/TRAINING PROGRAM

## 2024-09-16 PROCEDURE — 3008F BODY MASS INDEX DOCD: CPT | Performed by: STUDENT IN AN ORGANIZED HEALTH CARE EDUCATION/TRAINING PROGRAM

## 2024-09-16 ASSESSMENT — PAIN SCALES - GENERAL: PAINLEVEL: 0-NO PAIN

## 2024-09-16 NOTE — PROGRESS NOTES
6 week POV  PVR = 50ml    Chaperone declined: Myrna Karimi, CM3      POST OPERATIVE VISIT    The patient is a 68 y.o. female who presents for a postoperative follow up visit.    She underwent midurethral sling, cystoscopy on 8/1/24 and is now 6 week(s)  post-op.      INTERVAL HISTORY:  CHRISTINA resolved. Notes bladder is slow to empty, has to wait to finish voiding (not bothersome). Notes UUI occasionally. Every other day holds her urine too long and loses a few drops.     Prolapse symptoms: No    Urinary Incontinence symptoms:       Stress incontinence: No       Urgency: Yes       Frequency: No       Urge incontinence: Yes    Voiding dysfunction symptoms: No    Defecatory symptoms: No    Fecal Incontinence: No    Pain: denies    PHYSICAL EXAM:  There were no vitals taken for this visit.  PVR (by ultrasound): 50 ml    General Appearance: well developed, good nutrition, well groomed, no deformities, normal habitus  Head: normocephalic, and atraumatic  Neck: symmetric, midline trachea,  full range of motion  Respiratory: no dyspnea, negative for intercostal retraction or uses of accessory muscles of respiration  Cardiovascular: peripheral pulses are normal, no swelling, edema or varicosities  Abdomen: Abdomen soft, non-tender, non-distended.  Incisions well-healed     Pelvic:  CST:  negative  External genitalia: normal appearance, normal Bartholin's glands and [place]'s glands   Urethra: normal meatus, non-tender, no periurethral mass,   no evidence of exposed mesh  Vaginal mucosa: normal, no granulation tissue, no strictures,  incisions well-healed,  no evidence of exposed mesh  Atrophy   No    POP-Q (in supine position):  Rectocele unchanged        Impression/plan:  68 y.o. female with history of CHRISTINA who underwent sling, cysto on 8/1/24    She is doing well postoperatively.    - She is healing normally  - Preoperative symptoms resolved  - Resume normal activities  - Return in 1 year(s), sooner if needed    OAB  -  occasional UUI if she waits too long  - not currently bothersome  - discussed could start medication if worsening  - opting to monitor at this time    Liss Jimenez MD

## 2024-12-11 ENCOUNTER — APPOINTMENT (OUTPATIENT)
Dept: PRIMARY CARE | Facility: CLINIC | Age: 69
End: 2024-12-11
Payer: MEDICARE

## 2024-12-11 VITALS
DIASTOLIC BLOOD PRESSURE: 68 MMHG | HEIGHT: 62 IN | BODY MASS INDEX: 39.38 KG/M2 | RESPIRATION RATE: 18 BRPM | HEART RATE: 80 BPM | OXYGEN SATURATION: 96 % | WEIGHT: 214 LBS | TEMPERATURE: 97.7 F | SYSTOLIC BLOOD PRESSURE: 130 MMHG

## 2024-12-11 DIAGNOSIS — G89.18 POST-OP PAIN: ICD-10-CM

## 2024-12-11 DIAGNOSIS — E88.810 METABOLIC SYNDROME: Primary | ICD-10-CM

## 2024-12-11 DIAGNOSIS — E55.9 VITAMIN D DEFICIENCY: ICD-10-CM

## 2024-12-11 DIAGNOSIS — I15.8 OTHER SECONDARY HYPERTENSION: ICD-10-CM

## 2024-12-11 DIAGNOSIS — E78.49 OTHER HYPERLIPIDEMIA: ICD-10-CM

## 2024-12-11 DIAGNOSIS — I10 PRIMARY HYPERTENSION: ICD-10-CM

## 2024-12-11 DIAGNOSIS — E66.01 MORBID (SEVERE) OBESITY DUE TO EXCESS CALORIES (MULTI): ICD-10-CM

## 2024-12-11 DIAGNOSIS — Z23 NEED FOR VACCINATION: ICD-10-CM

## 2024-12-11 DIAGNOSIS — E74.819 DISORDERS OF GLUCOSE TRANSPORT, UNSPECIFIED (MULTI): ICD-10-CM

## 2024-12-11 PROCEDURE — G0008 ADMIN INFLUENZA VIRUS VAC: HCPCS | Performed by: INTERNAL MEDICINE

## 2024-12-11 PROCEDURE — 1123F ACP DISCUSS/DSCN MKR DOCD: CPT | Performed by: INTERNAL MEDICINE

## 2024-12-11 PROCEDURE — 3008F BODY MASS INDEX DOCD: CPT | Performed by: INTERNAL MEDICINE

## 2024-12-11 PROCEDURE — 1157F ADVNC CARE PLAN IN RCRD: CPT | Performed by: INTERNAL MEDICINE

## 2024-12-11 PROCEDURE — 1160F RVW MEDS BY RX/DR IN RCRD: CPT | Performed by: INTERNAL MEDICINE

## 2024-12-11 PROCEDURE — 3075F SYST BP GE 130 - 139MM HG: CPT | Performed by: INTERNAL MEDICINE

## 2024-12-11 PROCEDURE — 1159F MED LIST DOCD IN RCRD: CPT | Performed by: INTERNAL MEDICINE

## 2024-12-11 PROCEDURE — 1036F TOBACCO NON-USER: CPT | Performed by: INTERNAL MEDICINE

## 2024-12-11 PROCEDURE — 99214 OFFICE O/P EST MOD 30 MIN: CPT | Performed by: INTERNAL MEDICINE

## 2024-12-11 PROCEDURE — 3078F DIAST BP <80 MM HG: CPT | Performed by: INTERNAL MEDICINE

## 2024-12-11 PROCEDURE — 90662 IIV NO PRSV INCREASED AG IM: CPT | Performed by: INTERNAL MEDICINE

## 2024-12-11 RX ORDER — MELOXICAM 15 MG/1
15 TABLET ORAL
Qty: 30 TABLET | Refills: 0 | Status: SHIPPED | OUTPATIENT
Start: 2024-12-11

## 2024-12-11 ASSESSMENT — ENCOUNTER SYMPTOMS
NAUSEA: 0
ARTHRALGIAS: 0
ABDOMINAL PAIN: 0
CONSTIPATION: 0
VOMITING: 0
WEAKNESS: 0
UNEXPECTED WEIGHT CHANGE: 0
DIZZINESS: 0
SORE THROAT: 0
CHILLS: 0
SHORTNESS OF BREATH: 0
SLEEP DISTURBANCE: 0
JOINT SWELLING: 0
DIARRHEA: 0
CHEST TIGHTNESS: 0
FATIGUE: 0
WHEEZING: 0
CONFUSION: 0
PALPITATIONS: 0
COUGH: 0
DYSURIA: 0
ADENOPATHY: 0

## 2024-12-11 NOTE — PATIENT INSTRUCTIONS
Was nice seeing you today.  Continue same medication.  Have lab work done before next appointment if labs were ordered today.  Fu awtiana  Call/ contact our office with any concerns.    If you have labs or test done and you can't see the report in your chart or you didn't hear from us in 2 weeks after test/labs done , please, call our office for reports.  Please , do not assume that they were normal.    Any test results  and questions you might have , will be discussed at next visit -- please make sure to make a follow up appt after testing if reports are abnormal or you have questions.

## 2024-12-11 NOTE — PROGRESS NOTES
Subjective   Billie Walsh is a 69 y.o. female who presents for Follow-up (6 months follow up ).  6 months  follow  up metabolic SD, obesity - not on TX  , the   ins  denied the PA for wegovy  Follow up HTN- checking BP  only sometimes , no headaches ,no dizziness  Had both knee replacement sx,  Taking meloxicam prn after sx,   still has right tight pain , better controlled with meloxicam then motrin. To see ortho and dr Greenberg, might need injection.       Review of Systems   Constitutional:  Negative for chills, fatigue and unexpected weight change.        Comment   HENT:  Negative for congestion, ear pain and sore throat.    Respiratory:  Negative for cough, chest tightness, shortness of breath and wheezing.    Cardiovascular:  Negative for palpitations and leg swelling.   Gastrointestinal:  Negative for abdominal pain, constipation, diarrhea, nausea and vomiting.   Genitourinary:  Negative for dysuria and urgency.   Musculoskeletal:  Negative for arthralgias and joint swelling.   Skin:  Negative for rash.   Neurological:  Negative for dizziness and weakness.   Hematological:  Negative for adenopathy.   Psychiatric/Behavioral:  Negative for confusion and sleep disturbance.        Objective   Physical Exam  Constitutional:       Appearance: Normal appearance.      Comments: obese   HENT:      Head: Normocephalic and atraumatic.   Eyes:      Pupils: Pupils are equal, round, and reactive to light.   Cardiovascular:      Rate and Rhythm: Normal rate and regular rhythm.   Pulmonary:      Effort: Pulmonary effort is normal.      Breath sounds: Normal breath sounds.   Musculoskeletal:         General: Normal range of motion.      Cervical back: Normal range of motion and neck supple.   Skin:     General: Skin is warm.   Neurological:      General: No focal deficit present.      Mental Status: She is alert and oriented to person, place, and time.   Psychiatric:         Mood and Affect: Mood normal.          "Behavior: Behavior normal.       /68 (BP Location: Right arm, Patient Position: Sitting)   Pulse 80   Temp 36.5 °C (97.7 °F)   Resp 18   Ht 1.575 m (5' 2\")   Wt 97.1 kg (214 lb)   SpO2 96%   BMI 39.14 kg/m²     Lab Results   Component Value Date    CHOL 166 03/06/2024    CHOL 171 03/23/2023    CHOL 173 03/08/2022     Lab Results   Component Value Date    HDL 53.5 03/06/2024    HDL 49.5 03/23/2023    HDL 50.0 03/08/2022     Lab Results   Component Value Date    LDLCALC 97 03/06/2024     Lab Results   Component Value Date    TRIG 77 03/06/2024    TRIG 110 03/23/2023    TRIG 96 03/08/2022     No components found for: \"CHOLHDL\"    Chemistry    Lab Results   Component Value Date/Time     07/26/2024 1028    K 4.3 07/26/2024 1028     07/26/2024 1028    CO2 25 07/26/2024 1028    BUN 15 07/26/2024 1028    CREATININE 0.65 07/26/2024 1028    Lab Results   Component Value Date/Time    CALCIUM 9.6 07/26/2024 1028    ALKPHOS 106 03/06/2024 1101    AST 18 03/06/2024 1101    ALT 12 03/06/2024 1101    BILITOT 0.9 03/06/2024 1101          Assessment/Plan   Problem List Items Addressed This Visit       Hyperlipemia     Not on statin  Cardiac calcium score 4.6 , no need for statin tx         Relevant Orders    TSH with reflex to Free T4 if abnormal    Lipid Panel    Primary hypertension    Relevant Orders    CBC and Auto Differential    Urinalysis with Reflex Microscopic    Vitamin B12    Magnesium    Hemoglobin A1C    Comprehensive Metabolic Panel    Metabolic syndrome - Primary     No DM, only glucose intolerance, not on cholesterol medication .  On metformin         Relevant Orders    Insulin, Fasting    Vitamin D deficiency    Relevant Orders    Vitamin D 25-Hydroxy,Total (for eval of Vitamin D levels)    Morbid (severe) obesity due to excess calories (Multi)    Relevant Orders    Insulin, Fasting    Disorders of glucose transport, unspecified (Multi)    Relevant Orders    Insulin, Fasting    Post-op pain "    Relevant Medications    meloxicam (Mobic) 15 mg tablet    RESOLVED: Hypertension    Relevant Orders    CBC and Auto Differential    Urinalysis with Reflex Microscopic    Vitamin B12    Magnesium    Hemoglobin A1C    Comprehensive Metabolic Panel     Other Visit Diagnoses       Need for vaccination        Relevant Orders    Flu vaccine, trivalent, preservative free, HIGH-DOSE, age 65y+ (Fluzone)

## 2024-12-12 ENCOUNTER — HOSPITAL ENCOUNTER (OUTPATIENT)
Dept: RADIOLOGY | Facility: HOSPITAL | Age: 69
Discharge: HOME | End: 2024-12-12
Payer: MEDICARE

## 2024-12-12 VITALS — WEIGHT: 214 LBS | BODY MASS INDEX: 39.38 KG/M2 | HEIGHT: 62 IN

## 2024-12-12 DIAGNOSIS — Z12.31 ENCOUNTER FOR SCREENING MAMMOGRAM FOR MALIGNANT NEOPLASM OF BREAST: ICD-10-CM

## 2024-12-12 DIAGNOSIS — M81.0 AGE-RELATED OSTEOPOROSIS WITHOUT CURRENT PATHOLOGICAL FRACTURE: ICD-10-CM

## 2024-12-12 DIAGNOSIS — Z13.820 SCREENING FOR OSTEOPOROSIS: ICD-10-CM

## 2024-12-12 PROCEDURE — 77080 DXA BONE DENSITY AXIAL: CPT

## 2024-12-12 PROCEDURE — 77080 DXA BONE DENSITY AXIAL: CPT | Performed by: STUDENT IN AN ORGANIZED HEALTH CARE EDUCATION/TRAINING PROGRAM

## 2024-12-12 PROCEDURE — 77063 BREAST TOMOSYNTHESIS BI: CPT

## 2025-02-03 ENCOUNTER — APPOINTMENT (OUTPATIENT)
Dept: SLEEP MEDICINE | Facility: CLINIC | Age: 70
End: 2025-02-03
Payer: MEDICARE

## 2025-02-03 VITALS
DIASTOLIC BLOOD PRESSURE: 78 MMHG | OXYGEN SATURATION: 94 % | WEIGHT: 219.4 LBS | BODY MASS INDEX: 40.37 KG/M2 | RESPIRATION RATE: 18 BRPM | TEMPERATURE: 98.1 F | HEIGHT: 62 IN | SYSTOLIC BLOOD PRESSURE: 118 MMHG | HEART RATE: 68 BPM

## 2025-02-03 DIAGNOSIS — E66.01 OBESITY, CLASS III, BMI 40-49.9 (MORBID OBESITY) (MULTI): ICD-10-CM

## 2025-02-03 DIAGNOSIS — G47.33 OSA (OBSTRUCTIVE SLEEP APNEA): Primary | ICD-10-CM

## 2025-02-03 DIAGNOSIS — R25.2 LEG CRAMPING: ICD-10-CM

## 2025-02-03 PROCEDURE — 1157F ADVNC CARE PLAN IN RCRD: CPT | Performed by: GENERAL PRACTICE

## 2025-02-03 PROCEDURE — G2211 COMPLEX E/M VISIT ADD ON: HCPCS | Performed by: GENERAL PRACTICE

## 2025-02-03 PROCEDURE — 1036F TOBACCO NON-USER: CPT | Performed by: GENERAL PRACTICE

## 2025-02-03 PROCEDURE — 1159F MED LIST DOCD IN RCRD: CPT | Performed by: GENERAL PRACTICE

## 2025-02-03 PROCEDURE — 3078F DIAST BP <80 MM HG: CPT | Performed by: GENERAL PRACTICE

## 2025-02-03 PROCEDURE — 99214 OFFICE O/P EST MOD 30 MIN: CPT | Performed by: GENERAL PRACTICE

## 2025-02-03 PROCEDURE — 1123F ACP DISCUSS/DSCN MKR DOCD: CPT | Performed by: GENERAL PRACTICE

## 2025-02-03 PROCEDURE — 3074F SYST BP LT 130 MM HG: CPT | Performed by: GENERAL PRACTICE

## 2025-02-03 PROCEDURE — 3008F BODY MASS INDEX DOCD: CPT | Performed by: GENERAL PRACTICE

## 2025-02-03 ASSESSMENT — COLUMBIA-SUICIDE SEVERITY RATING SCALE - C-SSRS
2. HAVE YOU ACTUALLY HAD ANY THOUGHTS OF KILLING YOURSELF?: NO
6. HAVE YOU EVER DONE ANYTHING, STARTED TO DO ANYTHING, OR PREPARED TO DO ANYTHING TO END YOUR LIFE?: NO
1. IN THE PAST MONTH, HAVE YOU WISHED YOU WERE DEAD OR WISHED YOU COULD GO TO SLEEP AND NOT WAKE UP?: NO

## 2025-02-03 ASSESSMENT — SLEEP AND FATIGUE QUESTIONNAIRES
HOW LIKELY ARE YOU TO NOD OFF OR FALL ASLEEP WHILE SITTING QUIETLY AFTER LUNCH WITHOUT ALCOHOL: WOULD NEVER DOZE
HOW LIKELY ARE YOU TO NOD OFF OR FALL ASLEEP WHILE SITTING INACTIVE IN A PUBLIC PLACE: SLIGHT CHANCE OF DOZING
HOW LIKELY ARE YOU TO NOD OFF OR FALL ASLEEP WHILE SITTING AND READING: SLIGHT CHANCE OF DOZING
ESS TOTAL SCORE: 5
HOW LIKELY ARE YOU TO NOD OFF OR FALL ASLEEP WHILE LYING DOWN TO REST IN THE AFTERNOON WHEN CIRCUMSTANCES PERMIT: MODERATE CHANCE OF DOZING
HOW LIKELY ARE YOU TO NOD OFF OR FALL ASLEEP IN A CAR, WHILE STOPPED FOR A FEW MINUTES IN TRAFFIC: WOULD NEVER DOZE
HOW LIKELY ARE YOU TO NOD OFF OR FALL ASLEEP WHEN YOU ARE A PASSENGER IN A CAR FOR AN HOUR WITHOUT A BREAK: WOULD NEVER DOZE
HOW LIKELY ARE YOU TO NOD OFF OR FALL ASLEEP WHILE SITTING AND TALKING TO SOMEONE: WOULD NEVER DOZE
HOW LIKELY ARE YOU TO NOD OFF OR FALL ASLEEP WHILE WATCHING TV: SLIGHT CHANCE OF DOZING

## 2025-02-03 ASSESSMENT — ENCOUNTER SYMPTOMS
DEPRESSION: 0
LOSS OF SENSATION IN FEET: 0
OCCASIONAL FEELINGS OF UNSTEADINESS: 0

## 2025-02-03 NOTE — PROGRESS NOTES
Patient: Billie Walsh    [id number]  : 1955 -- AGE 69 y.o.    Provider: Roberto Jarquin DO     Location [facility]   Service Date: 2/3/2025            Follow up visit note     HISTORY OF PRESENT ILLNESS   HISTORY OF PRESENT ILLNESS   Billie Walsh is a 69 y.o. female who presents to a [facility] for a sleep medicine evaluation with concerns of Follow-up and Sleep Apnea.     The patient  has a past medical history of Anemia, Arthritis, Cataract, GERD (gastroesophageal reflux disease), Hypertension, Obesity, TIFFANY (obstructive sleep apnea), Osteoarthritis, PONV (postoperative nausea and vomiting), Stress incontinence, and Urinary tract infection..    PAST SLEEP HISTORY    pmhx including arrhythmia, arthritis, back pain, DM?,metabolic syndrome, GERD, HTN.     patient has been snoring for yrs but has not done anything for it in the past. Around  patient started experiencing fatigue, un-refreshing sleep and sleepiness; she feels that symptoms have worsened recently.     CURRENT HISTORY    24 she patient reports that she tries to use pap therapy regularly. She is comfortable with mask and settings.     PAP Related Problems: no leak perceived, no skin irritation from mask and no snoring with PAP.   Perceived Benefits of PAP Therapy: refreshing sleep, decreased nocturia, decreased dry mouth or sore throat, decreased daytime sleepiness, decreased nocturnal awakenings and decreased snoring/ choking/ gasping.     2/3/25    Patient has been trying to use pap therapy regularly. She is comfortable with mask and settings. Occasionally she falls asleep before putting on her machine. Sometimes she wakes up in the middle of the night and does not put her mask back on.     PAP Related Problems: no leak perceived, no skin irritation from mask and no snoring with PAP. Sometimes she gets a dry mouth.     Perceived Benefits of PAP Therapy: refreshing sleep, decreased  nocturia, decreased dry mouth or sore throat, decreased daytime sleepiness, decreased nocturnal awakenings and decreased snoring/ choking/ gasping.     Sleep schedule  on weekdays / work days:  Usual Bedtime: 10-11pm  Sleep latency: 15min   Wake time : 6-7am   Total sleep time average/day: 6-7 hours/day  Awakenings: 2x per week, varies in length, nocturia/ noise/ unknown.     Naps: none     Sleep schedule  on weekends/non work days :    Same as above     Sleep aid: none   Stimulants: none    Occupation: retired, used to be a .     Preferred sleeping position: SLEEP POSITION: sidelying    Sleep-related ROS:    Snoring:  rarely  Witnessed apneas:  n        Gasping/ choking: n  Am Dry mouth: sometimes.             Nasal congestion: n        am headaches: n    Sleep is described as refreshing .     Daytime sleepiness: sometimes   Fatigue or decreased energy: sometimes   Difficulty remembering things in daytime: n  Difficulty staying focused in daytime: n  Irritable during the day: n    Drowsy driving: n  Hx of car accident: n  Near-miss Car accident: n      RLS screen:  RLSSCREEN: - Sensations: Patient does not have unusual sensations in their extremities that cause an urge to move them   DENIES  Sleep-related behaviors: DENIES    Daytime Symptoms    ESS: 5        REVIEW OF SYSTEMS     REVIEW OF SYSTEMS  Review of Systems   All other systems reviewed and are negative.        ALLERGIES AND MEDICATIONS     ALLERGIES  Allergies   Allergen Reactions    Penicillins Unknown     Childhood reaction       MEDICATIONS  Current Outpatient Medications   Medication Sig Dispense Refill    amLODIPine (Norvasc) 10 mg tablet Take 1 tablet (10 mg) by mouth once daily at bedtime. 90 tablet 3    atenolol (Tenormin) 25 mg tablet Take 1 tablet (25 mg) by mouth once daily at bedtime. 90 tablet 3    calcium carbonate/vitamin D3 (CALCIUM 600 + D,3, ORAL) Take by mouth. 1200ui QD      cholecalciferol (Vitamin D-3) 25 MCG (1000 UT)  tablet Take 1 tablet (25 mcg) by mouth once daily.      meloxicam (Mobic) 15 mg tablet Take 1 tablet (15 mg) by mouth once daily with breakfast. 30 tablet 0    metFORMIN (Glucophage) 500 mg tablet Take 1 tablet (500 mg) by mouth once daily at bedtime. 90 tablet 3    pantoprazole (ProtoNix) 40 mg EC tablet Take 1 tablet (40 mg) by mouth once daily. 90 each 2     No current facility-administered medications for this visit.         PAST HISTORY     PAST MEDICAL HISTORY  She  has a past medical history of Anemia, Arthritis, Cataract, GERD (gastroesophageal reflux disease), Hypertension, Obesity, TIFFANY (obstructive sleep apnea), Osteoarthritis, PONV (postoperative nausea and vomiting), Stress incontinence, and Urinary tract infection.      PAST SURGICAL HISTORY:  Past Surgical History:   Procedure Laterality Date    BREAST BIOPSY      CARDIAC CATHETERIZATION      CHOLECYSTECTOMY      EYE SURGERY Right     2023    HYSTERECTOMY      KNEE ARTHROPLASTY      OTHER SURGICAL HISTORY  09/16/2019    Hysterectomy    OTHER SURGICAL HISTORY  09/16/2019    Cardiac catheterization    OTHER SURGICAL HISTORY  09/16/2019    Breast biopsy    OTHER SURGICAL HISTORY  09/16/2019    Cholecystectomy    TUBAL LIGATION      WISDOM TOOTH EXTRACTION         FAMILY HISTORY  Family History   Problem Relation Name Age of Onset    Hypertension Mother Maria A     Cancer Mother Maria A     Diabetes Mother Maria A     Heart attack Mother Maria A     Heart disease Mother Maria A     Heart disease Father      Coronary artery disease Brother Hugh     Other (Colitis) Brother Hugh     Hypertension Brother Hugh     Heart disease Brother Hugh     Heart attack Other Family history     Heart disease Maternal Grandfather Felice          SOCIAL HISTORY  She  reports that she quit smoking about 40 years ago. Her smoking use included cigarettes. She started smoking about 43 years ago. She has a 0.8 pack-year smoking history. She has never been exposed to tobacco smoke. She has never  "used smokeless tobacco. She reports that she does not currently use alcohol. She reports that she does not use drugs.    Caffeine consumption: 2 cups coffee in am.     Alcohol consumption: n  Smoking: n  Marijuana: n  Other drugs: n    PHYSICAL EXAM     VITAL SIGNS: /78   Pulse 68   Temp 36.7 °C (98.1 °F)   Resp 18   Ht 1.575 m (5' 2\")   Wt 99.5 kg (219 lb 6.4 oz)   SpO2 94%   BMI 40.13 kg/m²        PREVIOUS WEIGHTS:  Wt Readings from Last 3 Encounters:   02/03/25 99.5 kg (219 lb 6.4 oz)   12/12/24 97.1 kg (214 lb)   12/11/24 97.1 kg (214 lb)       Physical Exam  Constitutional: Alert and oriented, cooperative, no obvious distress.   HENT: normocephalic.   Neurologic: AOx3.   psychiatric: appropriate mood and affect.  Integumentary: no significant rashes observed over pap mask area.     RESULTS/DATA         ASSESSMENT/PLAN     Ms. Walsh is a 69 y.o. female and  has a past medical history of Anemia, Arthritis, Cataract, GERD (gastroesophageal reflux disease), Hypertension, Obesity, TIFFANY (obstructive sleep apnea), Osteoarthritis, PONV (postoperative nausea and vomiting), Stress incontinence, and Urinary tract infection. She is following up on her sleep apnea     Problem List Items Addressed This Visit    None      Problem List and Orders  retired, used to be a .      pmhx including arrhythmia, arthritis, back pain, DM?,metabolic syndrome, GERD, HTN.     1- TIFFANY  snoring, un-refreshing sleep, witnessed apnea, gasping and chocking, daytime sleepiness.      -PSG 8/10/21-->moderate TIFFANY, AHI 24, worse in supine and REM.   Reviewed and discussed the above sleep study results and download data and management options in details. All questions answered, patient verbalizes understanding.      -previously had adjusted From Autopap 5-15cwp, rAHI 1, median 9.3, max avg 12.2 to Autopap 8-15cwp    -cont. Autopap 8-15cwp, rAHI 1.2, median pressure 10, max avg 12.5, acceptable compliance, acceptable seal " most days.     -ordered supplies     -You can use a chin strap to help with your dry mouth, you can also adjust your humidity level to your comfort. You can use a nasal spray to help you breathe better from your nose.    -do not drive or operate heavy machinery if drowsy.  -avoid sleeping on your back.   -avoid sedating substances/ medication, alcohol, illicit drugs and tobacco.     2- Obesity  counseled on eating a healthy diet and exercising as tolerated.     3- calf cramping   some calf cramping 1x per month; she believes it is related to back problems.      4-insomnia resolved   both sleep onset and sleep maintenance both greatly improved with pap use.   likely multifactorial   counseled on good sleep hygiene practices.   not on medication, has tried melatonin in the past but it did not work well for her.        Follow up in 1yr or sooner as needed

## 2025-03-26 ENCOUNTER — APPOINTMENT (OUTPATIENT)
Dept: PRIMARY CARE | Facility: CLINIC | Age: 70
End: 2025-03-26
Payer: MEDICARE

## 2025-04-08 LAB
25(OH)D3+25(OH)D2 SERPL-MCNC: 46 NG/ML (ref 30–100)
ALBUMIN SERPL-MCNC: 4.5 G/DL (ref 3.6–5.1)
ALP SERPL-CCNC: 106 U/L (ref 37–153)
ALT SERPL-CCNC: 14 U/L (ref 6–29)
ANION GAP SERPL CALCULATED.4IONS-SCNC: 11 MMOL/L (CALC) (ref 7–17)
APPEARANCE UR: CLEAR
AST SERPL-CCNC: 17 U/L (ref 10–35)
BACTERIA #/AREA URNS HPF: ABNORMAL /HPF
BASOPHILS # BLD AUTO: 68 CELLS/UL (ref 0–200)
BASOPHILS NFR BLD AUTO: 0.9 %
BILIRUB SERPL-MCNC: 1 MG/DL (ref 0.2–1.2)
BILIRUB UR QL STRIP: NEGATIVE
BUN SERPL-MCNC: 16 MG/DL (ref 7–25)
CALCIUM SERPL-MCNC: 10 MG/DL (ref 8.6–10.4)
CHLORIDE SERPL-SCNC: 104 MMOL/L (ref 98–110)
CHOLEST SERPL-MCNC: 166 MG/DL
CHOLEST/HDLC SERPL: 3 (CALC)
CO2 SERPL-SCNC: 27 MMOL/L (ref 20–32)
COLOR UR: YELLOW
CREAT SERPL-MCNC: 0.68 MG/DL (ref 0.6–1)
EGFRCR SERPLBLD CKD-EPI 2021: 94 ML/MIN/1.73M2
EOSINOPHIL # BLD AUTO: 99 CELLS/UL (ref 15–500)
EOSINOPHIL NFR BLD AUTO: 1.3 %
ERYTHROCYTE [DISTWIDTH] IN BLOOD BY AUTOMATED COUNT: 12.8 % (ref 11–15)
EST. AVERAGE GLUCOSE BLD GHB EST-MCNC: 123 MG/DL
EST. AVERAGE GLUCOSE BLD GHB EST-SCNC: 6.8 MMOL/L
GLUCOSE SERPL-MCNC: 100 MG/DL (ref 65–99)
GLUCOSE UR QL STRIP: NEGATIVE
HBA1C MFR BLD: 5.9 % OF TOTAL HGB
HCT VFR BLD AUTO: 42.2 % (ref 35–45)
HDLC SERPL-MCNC: 56 MG/DL
HGB BLD-MCNC: 13.6 G/DL (ref 11.7–15.5)
HGB UR QL STRIP: NEGATIVE
HYALINE CASTS #/AREA URNS LPF: ABNORMAL /LPF
INSULIN SERPL-ACNC: 16.7 UIU/ML
KETONES UR QL STRIP: NEGATIVE
LDLC SERPL CALC-MCNC: 89 MG/DL (CALC)
LEUKOCYTE ESTERASE UR QL STRIP: ABNORMAL
LYMPHOCYTES # BLD AUTO: 2371 CELLS/UL (ref 850–3900)
LYMPHOCYTES NFR BLD AUTO: 31.2 %
MAGNESIUM SERPL-MCNC: 2.3 MG/DL (ref 1.5–2.5)
MCH RBC QN AUTO: 28.3 PG (ref 27–33)
MCHC RBC AUTO-ENTMCNC: 32.2 G/DL (ref 32–36)
MCV RBC AUTO: 87.9 FL (ref 80–100)
MONOCYTES # BLD AUTO: 547 CELLS/UL (ref 200–950)
MONOCYTES NFR BLD AUTO: 7.2 %
NEUTROPHILS # BLD AUTO: 4514 CELLS/UL (ref 1500–7800)
NEUTROPHILS NFR BLD AUTO: 59.4 %
NITRITE UR QL STRIP: NEGATIVE
NONHDLC SERPL-MCNC: 110 MG/DL (CALC)
PH UR STRIP: 5.5 [PH] (ref 5–8)
PLATELET # BLD AUTO: 332 THOUSAND/UL (ref 140–400)
PMV BLD REES-ECKER: 11.6 FL (ref 7.5–12.5)
POTASSIUM SERPL-SCNC: 5 MMOL/L (ref 3.5–5.3)
PROT SERPL-MCNC: 7.2 G/DL (ref 6.1–8.1)
PROT UR QL STRIP: NEGATIVE
RBC # BLD AUTO: 4.8 MILLION/UL (ref 3.8–5.1)
RBC #/AREA URNS HPF: ABNORMAL /HPF
SERVICE CMNT-IMP: ABNORMAL
SODIUM SERPL-SCNC: 142 MMOL/L (ref 135–146)
SP GR UR STRIP: 1.02 (ref 1–1.03)
SQUAMOUS #/AREA URNS HPF: ABNORMAL /HPF
TRIGL SERPL-MCNC: 111 MG/DL
TSH SERPL-ACNC: 1.76 MIU/L (ref 0.4–4.5)
VIT B12 SERPL-MCNC: 548 PG/ML (ref 200–1100)
WBC # BLD AUTO: 7.6 THOUSAND/UL (ref 3.8–10.8)
WBC #/AREA URNS HPF: ABNORMAL /HPF

## 2025-04-16 ENCOUNTER — APPOINTMENT (OUTPATIENT)
Dept: PRIMARY CARE | Facility: CLINIC | Age: 70
End: 2025-04-16
Payer: MEDICARE

## 2025-04-16 VITALS
BODY MASS INDEX: 39.86 KG/M2 | WEIGHT: 216.6 LBS | TEMPERATURE: 98.2 F | SYSTOLIC BLOOD PRESSURE: 126 MMHG | HEART RATE: 74 BPM | HEIGHT: 62 IN | OXYGEN SATURATION: 97 % | DIASTOLIC BLOOD PRESSURE: 64 MMHG | RESPIRATION RATE: 16 BRPM

## 2025-04-16 DIAGNOSIS — Z00.00 MEDICARE ANNUAL WELLNESS VISIT, SUBSEQUENT: Primary | ICD-10-CM

## 2025-04-16 DIAGNOSIS — G47.33 OSA (OBSTRUCTIVE SLEEP APNEA): ICD-10-CM

## 2025-04-16 DIAGNOSIS — Z23 IMMUNIZATION DUE: ICD-10-CM

## 2025-04-16 DIAGNOSIS — H91.90 HOH (HARD OF HEARING): ICD-10-CM

## 2025-04-16 DIAGNOSIS — Z12.11 SCREENING FOR COLON CANCER: ICD-10-CM

## 2025-04-16 DIAGNOSIS — I10 PRIMARY HYPERTENSION: ICD-10-CM

## 2025-04-16 DIAGNOSIS — E88.810 METABOLIC SYNDROME: ICD-10-CM

## 2025-04-16 DIAGNOSIS — E78.49 OTHER HYPERLIPIDEMIA: ICD-10-CM

## 2025-04-16 DIAGNOSIS — Z13.820 SCREENING FOR OSTEOPOROSIS: ICD-10-CM

## 2025-04-16 DIAGNOSIS — E74.819 DISORDERS OF GLUCOSE TRANSPORT, UNSPECIFIED (MULTI): ICD-10-CM

## 2025-04-16 PROCEDURE — 1158F ADVNC CARE PLAN TLK DOCD: CPT | Performed by: INTERNAL MEDICINE

## 2025-04-16 PROCEDURE — 99213 OFFICE O/P EST LOW 20 MIN: CPT | Performed by: INTERNAL MEDICINE

## 2025-04-16 PROCEDURE — 3078F DIAST BP <80 MM HG: CPT | Performed by: INTERNAL MEDICINE

## 2025-04-16 PROCEDURE — 1157F ADVNC CARE PLAN IN RCRD: CPT | Performed by: INTERNAL MEDICINE

## 2025-04-16 PROCEDURE — 1036F TOBACCO NON-USER: CPT | Performed by: INTERNAL MEDICINE

## 2025-04-16 PROCEDURE — 1159F MED LIST DOCD IN RCRD: CPT | Performed by: INTERNAL MEDICINE

## 2025-04-16 PROCEDURE — G0009 ADMIN PNEUMOCOCCAL VACCINE: HCPCS | Performed by: INTERNAL MEDICINE

## 2025-04-16 PROCEDURE — 3074F SYST BP LT 130 MM HG: CPT | Performed by: INTERNAL MEDICINE

## 2025-04-16 PROCEDURE — 90677 PCV20 VACCINE IM: CPT | Performed by: INTERNAL MEDICINE

## 2025-04-16 PROCEDURE — 3008F BODY MASS INDEX DOCD: CPT | Performed by: INTERNAL MEDICINE

## 2025-04-16 PROCEDURE — 1160F RVW MEDS BY RX/DR IN RCRD: CPT | Performed by: INTERNAL MEDICINE

## 2025-04-16 PROCEDURE — 1123F ACP DISCUSS/DSCN MKR DOCD: CPT | Performed by: INTERNAL MEDICINE

## 2025-04-16 PROCEDURE — G0439 PPPS, SUBSEQ VISIT: HCPCS | Performed by: INTERNAL MEDICINE

## 2025-04-16 PROCEDURE — 1170F FXNL STATUS ASSESSED: CPT | Performed by: INTERNAL MEDICINE

## 2025-04-16 ASSESSMENT — ACTIVITIES OF DAILY LIVING (ADL)
MANAGING_FINANCES: INDEPENDENT
GROCERY_SHOPPING: INDEPENDENT
DRESSING: INDEPENDENT
TAKING_MEDICATION: INDEPENDENT
BATHING: INDEPENDENT

## 2025-04-16 ASSESSMENT — ENCOUNTER SYMPTOMS
VOMITING: 0
SORE THROAT: 0
SLEEP DISTURBANCE: 0
DIZZINESS: 0
CONSTIPATION: 0
CHEST TIGHTNESS: 0
JOINT SWELLING: 0
WHEEZING: 0
HEADACHES: 1
ARTHRALGIAS: 1
CHILLS: 0
FATIGUE: 0
NAUSEA: 0
COUGH: 0
UNEXPECTED WEIGHT CHANGE: 0
PALPITATIONS: 0
ADENOPATHY: 0
CONFUSION: 0
ABDOMINAL PAIN: 0
DYSURIA: 0
DIARRHEA: 0
SHORTNESS OF BREATH: 0
WEAKNESS: 0

## 2025-04-16 NOTE — PATIENT INSTRUCTIONS
Was nice seeing you today.  Continue same medication.  Have lab work done before next appointment if labs were ordered today.  Fu in 3 month/cpe  Call/ contact our office with any concerns.    If you have labs or test done and you can't see the report in your chart or you didn't hear from us in 2 weeks after test/labs done , please, call our office for reports.  Please , do not assume that they were normal.    Any test results  and questions you might have , will be discussed at next visit -- please make sure to make a follow up appt after testing if reports are abnormal or you have questions.

## 2025-04-16 NOTE — PROGRESS NOTES
"Subjective   Reason for Visit: Billie Walsh is an 69 y.o. female here for a Medicare Wellness visit.     Past Medical, Surgical, and Family History reviewed and updated in chart.    Reviewed all medications by prescribing practitioner or clinical pharmacist (such as prescriptions, OTCs, herbal therapies and supplements) and documented in the medical record.    AWV  Former   smoker-quit in 1985  Colonoscopy- [id number]  given- 3.21.2024-not  done  DEXA and mammogram -12.12.2024   Labs and test reviewed with patient , all question answered, further evaluation, if needed , discussed.    \        Patient Care Team:  Mario Alberto Fountain MD as PCP - General (Internal Medicine)  Mraio Alberto Fountain MD as PCP - [facility]     Review of Systems   Constitutional:  Negative for chills, fatigue and unexpected weight change.        Comment   HENT:  Negative for congestion, ear pain and sore throat.    Respiratory:  Negative for cough, chest tightness, shortness of breath and wheezing.    Cardiovascular:  Negative for palpitations and leg swelling.   Gastrointestinal:  Negative for abdominal pain, constipation, diarrhea, nausea and vomiting.   Genitourinary:  Negative for dysuria and urgency.   Musculoskeletal:  Positive for arthralgias. Negative for joint swelling.   Skin:  Negative for rash.   Neurological:  Positive for headaches. Negative for dizziness and weakness.        Sometimes    Numbness hands and R leg due to her  back    Hematological:  Negative for adenopathy.   Psychiatric/Behavioral:  Negative for confusion and sleep disturbance.    All other systems reviewed and are negative.      Objective   Vitals:  /64 (BP Location: Left arm, Patient Position: Sitting)   Pulse 74   Temp 36.8 °C (98.2 °F)   Resp 16   Ht 1.575 m (5' 2\")   Wt 98.2 kg (216 lb 9.6 oz)   SpO2 97%   BMI 39.62 kg/m²       Physical Exam  Constitutional:       Appearance: Normal appearance.   HENT:      Head: " "Normocephalic and atraumatic.   Eyes:      Pupils: Pupils are equal, round, and reactive to light.   Cardiovascular:      Rate and Rhythm: Normal rate and regular rhythm.   Pulmonary:      Effort: Pulmonary effort is normal.      Breath sounds: Normal breath sounds.   Musculoskeletal:         General: Normal range of motion.      Cervical back: Normal range of motion and neck supple.   Skin:     General: Skin is warm.   Neurological:      General: No focal deficit present.      Mental Status: She is alert and oriented to person, place, and time.   Psychiatric:         Mood and Affect: Mood normal.         Behavior: Behavior normal.       Lab Results   Component Value Date    WBC 7.6 04/07/2025    HGB 13.6 04/07/2025    HCT 42.2 04/07/2025    MCV 87.9 04/07/2025     04/07/2025     Lab Results   Component Value Date    GLUCOSE 100 (H) 04/07/2025    CALCIUM 10.0 04/07/2025     04/07/2025    K 5.0 04/07/2025    CO2 27 04/07/2025     04/07/2025    BUN 16 04/07/2025    CREATININE 0.68 04/07/2025     Lab Results   Component Value Date    ALT 14 04/07/2025    AST 17 04/07/2025    ALKPHOS 106 04/07/2025    BILITOT 1.0 04/07/2025     Lab Results   Component Value Date    TSH 1.76 04/07/2025     Lab Results   Component Value Date    CHOL 166 04/07/2025    CHOL 166 03/06/2024    CHOL 171 03/23/2023     Lab Results   Component Value Date    HDL 56 04/07/2025    HDL 53.5 03/06/2024    HDL 49.5 03/23/2023     Lab Results   Component Value Date    LDLCALC 89 04/07/2025    LDLCALC 97 03/06/2024     Lab Results   Component Value Date    TRIG 111 04/07/2025    TRIG 77 03/06/2024    TRIG 110 03/23/2023     No components found for: \"CHOLHDL\"    Assessment & Plan  Screening for colon cancer         Screening for osteoporosis         Primary hypertension         Other hyperlipidemia         Immunization due    Orders:    Pneumococcal conjugate vaccine, 20-valent (PREVNAR 20)    Metabolic syndrome         Disorders of " glucose transport, unspecified (Multi)         Medicare annual wellness visit, subsequent         TIFFANY (obstructive sleep apnea)         [place] (hard of hearing)    Orders:    Referral to ENT; Future    Referral to Audiology; Future

## 2025-04-18 ENCOUNTER — TELEPHONE (OUTPATIENT)
Dept: PRIMARY CARE | Facility: CLINIC | Age: 70
End: 2025-04-18
Payer: MEDICARE

## 2025-04-18 DIAGNOSIS — Z12.11 SPECIAL SCREENING FOR MALIGNANT NEOPLASMS, COLON: Primary | ICD-10-CM

## 2025-04-22 ENCOUNTER — TELEPHONE (OUTPATIENT)
Dept: GASTROENTEROLOGY | Facility: EXTERNAL LOCATION | Age: 70
End: 2025-04-22
Payer: MEDICARE

## 2025-04-25 ENCOUNTER — TELEPHONE (OUTPATIENT)
Dept: GASTROENTEROLOGY | Facility: EXTERNAL LOCATION | Age: 70
End: 2025-04-25
Payer: MEDICARE

## 2025-04-30 ENCOUNTER — OFFICE VISIT (OUTPATIENT)
Dept: ORTHOPEDIC SURGERY | Facility: CLINIC | Age: 70
End: 2025-04-30
Payer: MEDICARE

## 2025-04-30 DIAGNOSIS — Z96.659 STATUS POST TOTAL KNEE REPLACEMENT, UNSPECIFIED LATERALITY: ICD-10-CM

## 2025-04-30 PROCEDURE — 1157F ADVNC CARE PLAN IN RCRD: CPT | Performed by: ORTHOPAEDIC SURGERY

## 2025-04-30 PROCEDURE — 1123F ACP DISCUSS/DSCN MKR DOCD: CPT | Performed by: ORTHOPAEDIC SURGERY

## 2025-04-30 PROCEDURE — 99213 OFFICE O/P EST LOW 20 MIN: CPT | Performed by: ORTHOPAEDIC SURGERY

## 2025-04-30 RX ORDER — CELECOXIB 100 MG/1
100 CAPSULE ORAL 2 TIMES DAILY
Qty: 60 CAPSULE | Refills: 0 | Status: SHIPPED | OUTPATIENT
Start: 2025-04-30 | End: 2025-05-30

## 2025-04-30 NOTE — PROGRESS NOTES
History of Present Illness:   Patient presents today status post right total knee arthroplasty about 1 year out, overall doing well.  She is some occasional aches and pains in the knee across the anterior superior aspect of the knee just proximal to the patella.  She also notes some ongoing swelling that occurs every now and again, she takes anti-inflammatories to help her somewhat.  She is also undergoing some treatment for her lumbar spine and is seeing a specialist on Friday.      Review of Systems   GENERAL: Negative for malaise, significant weight loss, fever  MUSCULOSKELETAL: see HPI  NEURO:  Negative    Physical Examination:  Right Knee:  Skin healthy and intact, well-healed incision  No gross swelling or ecchymosis  No varus malalignment  No valgus malalignment   No effusion  ROM:  0-100  No pain with internal rotation of the hip    No tenderness to palpation over medial joint line  No tenderness to palpation over lateral joint line  Mild tenderness with patellar compression  No laxity to valgus stress  No laxity to varus stress  Negative Lachman´s test  Negative anterior drawer test  Negative posterior drawer test  Negative Jeanna´s test    Neurovascular exam normal distally     Imaging:  Deferred    Assessment:   Patient with right knee pain/lateral thigh pain concern for component of lumbar radicular symptoms in the setting of inflammation from total joint replacement    Plan:  Recommended high-strength anti-inflammatory, formal physical therapy, exercise as well as strengthening of the knee.  We also encouraged follow-up with spine provider to evaluate necessary treatment going forward.  We will prescribe her Celebrex today temporarily.  A nonsteroidal anti-inflammatory drug (NSAID) was prescribed.  We reviewed the risks (including gastric issues, renal issues) and benefits of the medication.  We discussed a scheduled course if no adverse reactions to help with swelling / pain.  We discussed that  chronic usage should be checked with primary care physician.       Isaias Rivera PA-C     In a face to face encounter, I evaluated the patient and performed a physical examination, discussed pertinent diagnostic studies if indicated and discussed diagnosis and management strategies with both the patient and physician assistant / nurse practitioner.  I reviewed the PA/NP's note and agree with the documented findings and plan of care.        Jose Gallardo MD

## 2025-05-02 ENCOUNTER — HOSPITAL ENCOUNTER (OUTPATIENT)
Dept: RADIOLOGY | Facility: CLINIC | Age: 70
Discharge: HOME | End: 2025-05-02
Payer: MEDICARE

## 2025-05-02 ENCOUNTER — OFFICE VISIT (OUTPATIENT)
Dept: ORTHOPEDIC SURGERY | Facility: CLINIC | Age: 70
End: 2025-05-02
Payer: MEDICARE

## 2025-05-02 DIAGNOSIS — M70.61 TROCHANTERIC BURSITIS OF RIGHT HIP: ICD-10-CM

## 2025-05-02 DIAGNOSIS — M54.50 LUMBAR PAIN: ICD-10-CM

## 2025-05-02 DIAGNOSIS — M54.16 LUMBAR RADICULOPATHY: Primary | ICD-10-CM

## 2025-05-02 PROCEDURE — 2500000004 HC RX 250 GENERAL PHARMACY W/ HCPCS (ALT 636 FOR OP/ED): Performed by: ORTHOPAEDIC SURGERY

## 2025-05-02 PROCEDURE — 99204 OFFICE O/P NEW MOD 45 MIN: CPT | Performed by: ORTHOPAEDIC SURGERY

## 2025-05-02 PROCEDURE — 99202 OFFICE O/P NEW SF 15 MIN: CPT | Performed by: ORTHOPAEDIC SURGERY

## 2025-05-02 PROCEDURE — 72120 X-RAY BEND ONLY L-S SPINE: CPT

## 2025-05-02 PROCEDURE — 20610 DRAIN/INJ JOINT/BURSA W/O US: CPT | Mod: RT | Performed by: ORTHOPAEDIC SURGERY

## 2025-05-02 RX ORDER — TRIAMCINOLONE ACETONIDE 40 MG/ML
40 INJECTION, SUSPENSION INTRA-ARTICULAR; INTRAMUSCULAR
Status: COMPLETED | OUTPATIENT
Start: 2025-05-02 | End: 2025-05-02

## 2025-05-02 RX ORDER — LIDOCAINE HYDROCHLORIDE 10 MG/ML
9 INJECTION, SOLUTION INFILTRATION; PERINEURAL
Status: COMPLETED | OUTPATIENT
Start: 2025-05-02 | End: 2025-05-02

## 2025-05-02 RX ADMIN — TRIAMCINOLONE ACETONIDE 40 MG: 40 INJECTION, SUSPENSION INTRA-ARTICULAR; INTRAMUSCULAR at 13:18

## 2025-05-02 RX ADMIN — LIDOCAINE HYDROCHLORIDE 9 ML: 10 INJECTION, SOLUTION INFILTRATION; PERINEURAL at 13:18

## 2025-05-02 NOTE — PROGRESS NOTES
"[facility]FF [facility] \"Tim" is a 69 y.o. female who presents for Establish Care of the Lower Back (Last seen in August 2023/X-ray today).    HPI:  69-year-old female here for new patient evaluation of low back pain.  Seen previously in August 2023.  She denies any fever chills nausea vomiting night sweats.  She has no bowel or bladder complaints.    Physical exam:  Well-nourished, well kept.No lymphangitis or lymphadenopathy in the examined extremities.  Gait normal.  Can stand on heels and toes.   Examination of the back shows tenderness in the paraspinous musculature in the lumbosacral area.  There is decreased range of motion in all directions due to guarding/muscle spasms and pain at extremes.  There is good strength and no instability.  Examination of the lower extremities reveals no point tenderness, swelling, or deformity.  Range of motion of the hips, knees, and ankles are full without crepitance, instability, or exacerbation of pain.  Strength is 5/5 throughout.  No redness, abrasions, or lesions on extremities  Gross sensation intact in the extremities.  Deep tendon reflexes 1+ bilateral. Clonus negative.  Affect normal.  Alert and oriented ×3.  Coordination normal.    Imaging studies:  AP lateral flexion-extension plain films of the lumbar spine were ordered and reviewed today.    Assessment:  69-year-old female here for new patient evaluation of low back pain and right leg radicular pain.  We saw this patient for the same condition in August 2023, she wanted to try physical therapy.  About 6 months or year ago she started having increasing back pain and increasing right leg radicular pain down to the buttock thig into the knee and sometimes below the knee.  Not much at all on the left side.  A year ago she did have a right total knee replacement with Dr. Gallardo.  The knee pain went away but she is still having radicular type pain.  Overall its about 50-50 between the back and the right leg.  She has not done " [address]  Progress Note    Primary Care Doctor:  Katherine Cristina MD    Chief Complaint   Patient presents with    6 Month Follow-Up    Congestive Heart Failure        History of Present Illness:  75 y.o. female with history of NICM, BiV ICD  and , AF (not on anticoagulation, follows with Dr Gail Newton), ESCOBAR on cpap, fibromyalgia, sHF on entresto since , followed at [facility]  - for chest pain, LHC done, EGD with H pylori neg and anxiety/depression (family issues) seen by psychiatry and started on klonipin/zoloft. I had the pleasure of seeing Vale Palumbo in follow up for sHF. She is ambulatory and with her . Her optival shows normal impedence and increased activity. He went to [facility] for a month to visit her family. She denies any chest pain, palpitations, edema or shortness of breath. She does have some dizziness and feels that it is the jardiance. She has not had labs done since 2021. Past Medical History:   has a past medical history of Atrial fibrillation ([address] Cardiomyopathy, nonischemic ([address] CHF (congestive heart failure) ([address] Fibromyalgia, GERD (gastroesophageal reflux disease), HSV-1 (herpes simplex virus 1) infection, Hyperlipidemia, Sleep apnea, and Uterine fibroids affecting pregnancy. Surgical History:   has a past surgical history that includes  section; Pacemaker insertion (); Pacemaker insertion; Upper gastrointestinal endoscopy (2015); Colonoscopy (2015); Colonoscopy (N/A, 2019); Upper gastrointestinal endoscopy (N/A, 2019); Cardiac defibrillator placement; and Upper gastrointestinal endoscopy (N/A, 2020). Social History:   reports that she quit smoking about 23 years ago. She has a 2.50 pack-year smoking history. She has never used smokeless tobacco. She reports current alcohol use. She reports current drug use. Frequency: 2.00 times per week. Drug: Marijuana Veldon Bunting).    Family History:   Family History   Problem Relation Age of Onset    Diabetes Mother     Kidney Disease Mother         reanl failure       Home Medications:  Prior to Admission medications    Medication Sig Start Date End Date Taking? Authorizing Provider   carvedilol (COREG) 3.125 MG tablet Take 1 tablet by mouth 2 times daily 2/14/22  Yes SHAMEKA Quintero   digoxin (LANOXIN) 125 MCG tablet Take 1 tablet by mouth daily 2/14/22  Yes SHAMEKA Quintero - CNS   empagliflozin (JARDIANCE) 10 MG tablet Take 1 tablet by mouth daily 2/14/22  Yes SHAMEKA Quintero - CNS   spironolactone (ALDACTONE) 25 MG tablet Take 1/2 tablet M,F only 2/14/22  Yes SHAMEKA Quintero   sacubitril-valsartan (ENTRESTO) 24-26 MG per tablet Half of 24-26 mg twice a day 2/14/22  Yes SHAMEKA Quintero   clonazePAM (KLONOPIN) 0.5 MG tablet Take 0.5 tablets by mouth 2 times daily as needed for Anxiety for up to 90 days.  10/22/21 2/14/22 Yes Negro Ortega MD   escitalopram (LEXAPRO) 10 MG tablet Take 1 tablet daily 10/22/21  Yes Negro Ortega MD   ipratropium (ATROVENT) 0.03 % nasal spray 2 sprays by Each Nostril route every 12 hours For 3 weeks 6/17/21  Yes SHAMEKA Hernandez CNP   ciprofloxacin (CIPRO) 250 MG tablet TAKE 1 TABLET BY MOUTH AFTER SEXUAL INTERCOURSE 5/21/21  Yes SHAMEKA Jameson CNP   vitamin D3 (CHOLECALCIFEROL) 25 MCG (1000 UT) TABS tablet Take 1 tablet by mouth daily 10/28/20  Yes SHAMEKA Jameson CNP   Magnesium 400 MG TABS Take by mouth daily 10/28/20  Yes SHAMEKA Jameson CNP   calcium carbonate (OSCAL) 500 MG TABS tablet Take 1 tablet by mouth daily 10/28/20  Yes SHAMEKA Jameson CNP   vitamin E 400 UNIT capsule Take 400 Units by mouth daily   Yes Historical Provider, MD   Omega-3 Fatty Acids (FISH OIL) 1000 MG CAPS Take 3,000 mg by mouth 2 times daily    Yes Historical Provider, MD        Allergies:  Prednisone     Review of Systems:   · Constitutional: any recent physical therapy or chiropractic care.  She has a spondylolisthesis at L4 on 5 and L5 on S1 that is somewhat dynamic with flexion and extension.  She is unsure if she wants to consider surgery but she would like to look into injections and pain management.    We have ordered and reviewed tests, x-rays.  We reviewed the recent note from Dr. Gallardo from April 30, 2025 that discusses her stable status of her right total knee replacement.  This is a patient with an exacerbation of a chronic problem that is affecting her bodily function.    For complete plan and/or surgical details, please refer to Dr. Greenberg's portion of this split dictation.    -Davide Persaud PA-C    In a face-to-face encounter, I performed a history and physical examination, discussed pertinent diagnostic studies if indicated, and discussed diagnosis and management strategies with both the patient and the midlevel provider.  I reviewed the midlevel's note and agree with the documented findings and plan of care.    Patient known to me.  I saw her a couple years ago for back pain.  Now she has a severe exacerbation of this chronic problem.  She is at the point where she is severely inhibited where she is having difficulty functioning and getting around because she has pain in her back and goes down her right leg to her ankle and somewhat in the left leg a little bit as well but not as much as the right.  She has had no traumatic events.  No recent treatments.    On exam she is tender over the right hip bursa.  She walks and can get up on heels and toes.  Her back is clear.  Tender back with decreased range of motion and guarding.  X-rays were ordered and reviewed today and it shows a spondylolisthesis at L4-5 and L5-S1.  We did discuss and consider a fusion at both those levels with her today but at this point she wants to try more conservative treatment which I think is reasonable.    Risks/benefits of a cortisone injection including  infection, local skin irritation, skin atrophy, calcification, continued pain/discomfort, elevated blood sugar, burning, failure to relieve pain, and possible leg infection.  Postop discomfort can be alleviated with additional medications/ice/elevation/rest over the first 24 hours as recommended.  After explaining these issues to the patient, it was understood.  The injection site was sprayed with ethyl chloride.    L Inj/Asp: R hip joint on 5/2/2025 1:18 PM  Indications: pain  Details: 22 G needle, lateral approach  Medications: 40 mg triamcinolone acetonide 40 mg/mL; 9 mL lidocaine 10 mg/mL (1 %)      We will get her into physical therapy and get an MRI of her lumbar spine.  We will see how the shot does for her bursal pain.  Will see her back after the MRI.    Altaf Greenberg MD  Orthopedic surgery     there has been no unanticipated weight loss. There's been no change in energy level, sleep pattern, or activity level. · Eyes: No visual changes or diplopia. No scleral icterus. · ENT: No Headaches, hearing loss or vertigo. No mouth sores or sore throat. · Cardiovascular: Reviewed in HPI  · Respiratory: No cough or wheezing, no sputum production. No hematemesis. · Gastrointestinal: No abdominal pain, appetite loss, blood in stools. No change in bowel or bladder habits. · Genitourinary: No dysuria, trouble voiding, or hematuria. · Musculoskeletal:  No gait disturbance, weakness or joint complaints. · Integumentary: No rash or pruritis. · Neurological: No headache, diplopia, change in muscle strength, numbness or tingling. No change in gait, balance, coordination, mood, affect, memory, mentation, behavior. · Psychiatric: No anxiety, no depression. · Endocrine: No malaise, fatigue or temperature intolerance. No excessive thirst, fluid intake, or urination. No tremor. · Hematologic/Lymphatic: No abnormal bruising or bleeding, blood clots or swollen lymph nodes. · Allergic/Immunologic: No nasal congestion or hives. Physical Examination:    Vitals:    02/14/22 1126   BP: 106/70   Site: Left Upper Arm   Position: Sitting   Cuff Size: Medium Adult   Pulse: 81   SpO2: 98%   Weight: 139 lb (63 kg)   Height: 5' 5\" (1.651 m)        Constitutional and General Appearance: Warm and dry, no apparent distress, normal coloration  HEENT:  Normocephalic, atraumatic  Respiratory:  · Normal excursion and expansion without use of accessory muscles  · Resp Auscultation: Normal breath sounds without dullness  Cardiovascular:  · The apical impulses not displaced  · Heart tones are crisp and normal  · JVP normal cm H2O  · Regular rate and rhythm  · Peripheral pulses are symmetrical and full  · There is no clubbing, cyanosis of the extremities.   · no edema  · Pedal Pulses: 2+ and equal   Abdomen:  · No masses or tenderness  · Liver/Spleen: No Abnormalities Noted  Neurological/Psychiatric:  · Alert and oriented in all spheres  · Moves all extremities well  · Exhibits normal gait balance and coordination  · No abnormalities of mood, affect, memory, mentation, or behavior are noted    Lab Data:    CBC:   Lab Results   Component Value Date    WBC 6.1 07/02/2021    WBC 5.8 06/30/2020    WBC 6.2 06/29/2020    RBC 4.64 07/02/2021    RBC 4.29 06/30/2020    RBC 4.37 06/29/2020    HGB 14.7 07/02/2021    HGB 13.1 06/30/2020    HGB 13.4 06/29/2020    HCT 43.8 07/02/2021    HCT 39.1 06/30/2020    HCT 39.9 06/29/2020    MCV 94.3 07/02/2021    MCV 91.2 06/30/2020    MCV 91.3 06/29/2020    RDW 13.2 07/02/2021    RDW 12.9 06/30/2020    RDW 13.2 06/29/2020     07/02/2021     06/30/2020     06/29/2020     BMP:  Lab Results   Component Value Date     07/15/2021     07/02/2021     02/19/2021    K 4.8 07/15/2021    K 4.6 07/02/2021    K 5.2 02/19/2021    K 3.8 06/29/2020    K 6.1 06/28/2020     07/15/2021     07/02/2021    CL 98 02/19/2021    CO2 28 07/15/2021    CO2 25 07/02/2021    CO2 31 02/19/2021    PHOS 2.9 07/09/2018    PHOS 3.7 10/18/2017    PHOS 3.2 01/07/2016    BUN 22 07/15/2021    BUN 17 07/02/2021    BUN 25 02/19/2021    CREATININE 0.9 07/15/2021    CREATININE 0.7 07/02/2021    CREATININE 0.8 02/19/2021     BNP:   Lab Results   Component Value Date    PROBNP 162 07/15/2021    PROBNP 131 07/02/2021    PROBNP 100 02/19/2021     Cardiac Imaging:  Echo 2/19/2021  Summary   -Severely reduced global systolic function with an ejection fraction   estimated at 25-30%. -Global hypokinesis noted.   -Average global longitudinal strain is estimated at -9.2%(Abnormal)   -Mild mitral regurgitation.   -There is mild tricuspid regurgitation with a RVSP estimation of 26 mmHg. -Grade II diastolic dysfunction with elevated LV filling pressures.  Avg.   E/e'=26.7    Echo 6/29/2020:  Summary   -Left ventricular systolic function is reduced with ejection fraction   estimated at 30 %. -Abnormal septal motion.   -Normal left ventricular wall thickness.   -Left ventricular size is severely increased. - Grade II diastolic dysfunction with elevated LV filling pressures.   -The right atrium is mildly dilated. -Pacemaker / ICD lead was visualized in the right atrium and ventricle.   -Aortic valve appears sclerotic but opens adequately. -Mild aortic and tricuspid regurgitation is present. -Mild to moderate mitral regurgitation.   -Systolic pulmonary artery pressure (SPAP) is normal and estimated at 24   mmHg (right atrial pressure 3 mmHg). -IVC is normal in size (< 2.1 cm) and collapses > 50% with respiration   consistent with normal RA pressure (3 mmHg). Invasive procedures and treatments. [facility] 6/30/2020: Anatomy:   LM-Normal   LAD-Normal   Cx-Normal   OM- Normal   RCA-Dominant, Normal   RPDA- Normal   LVEF- 25  LVG- global hypokinesis  LVEDP- 5      Impression  ~Coronary Angiography w/ normal coronaries  ~LVG with LVEF of 25 and global regional wall motion abnormalities     Recommendation  ~Aggressive medical treatment and risk factor modification  ~1. Medications reviewed, no changes at this time. 2. Post cath IVF. Bedrest.  3. Cont OMT for CHF  4. Patient has been advised on the importance of regular exercise of at least 20-30 minutes daily alternating with aerobic and isometric activities. 5. Patient counseled about and offered assistance for smoking cessation   6. No indication for cardiac rehab  7. Follow up in 1-2 weeks with cardiology CHF clinic. Echo 10-19-18  - Left ventricle: The cavity size was moderately dilated. Wall    thickness was normal. Systolic function was severely reduced. The    estimated ejection fraction was in the range of 25% to 30%. There    is significant dyssynergy between the septal and lateral walls.    Mild hypokinesis of the inferoseptal myocardium. Severe    hypokinesis of the basal-midanterolateral myocardium. Moderate    hypokinesis of the apicallateral myocardium. Akinesis of the    apicalinferior myocardium. Doppler parameters are consistent with    abnormal left ventricular relaxation (grade 1 diastolic    dysfunction). - Ventricular septum: Septal motion showed abnormal function and    dyssynergy.  - Aortic valve: Trivial regurgitation.  - Right ventricle: The cavity size was mildly dilated. Wall    thickness was normal. Systolic function was normal by objective    interpretation. TAPSE: 2.6cm. Tricuspid annular systolic velocity:    57VD/A. A device lead is seen extending into the RV cavity.  - Right atrium: The atrium was mildly to moderately dilated. Thompson Vahid or catheter noted in right atrium.  - Tricuspid valve: Mild-moderate regurgitation.  - Pericardium, extracardiac: A trivial pericardial effusion was    identified.     Impressions:  Compared to the study from 1/9/61, LV systolic is  slightly more reduced. Assessment:    1. Chronic systolic heart failure (HCC) on arni, BB, digoxin and aldosterone antagonist   2. Non-ischemic cardiomyopathy ([address]    3. Biventricular ICD (implantable cardioverter-defibrillator) in place    4. PAF (paroxysmal atrial fibrillation) (HCC) no anticoag, follows with Dr Da Carlson   5. Vitamin d deficiency    Plan:   Patient Instructions   1. Blood work today  2. Cut entresto to half 24-26 mg twice a day  3. Continue all current medications  4. Check echo  5. RTO in 6 months  6.   Get exercising 4-5 days a week      I appreciate the opportunity of cooperating in the care of this individual.    SHAMEKA Jones - CNS, CNS, 2/14/2022, 12:46 PM

## 2025-05-08 ENCOUNTER — HOSPITAL ENCOUNTER (OUTPATIENT)
Dept: RADIOLOGY | Facility: HOSPITAL | Age: 70
Discharge: HOME | End: 2025-05-08
Payer: MEDICARE

## 2025-05-08 DIAGNOSIS — M54.50 LUMBAR PAIN: ICD-10-CM

## 2025-05-08 DIAGNOSIS — M54.16 LUMBAR RADICULOPATHY: ICD-10-CM

## 2025-05-08 PROCEDURE — 72148 MRI LUMBAR SPINE W/O DYE: CPT

## 2025-05-09 ENCOUNTER — OFFICE VISIT (OUTPATIENT)
Dept: ORTHOPEDIC SURGERY | Facility: CLINIC | Age: 70
End: 2025-05-09
Payer: MEDICARE

## 2025-05-09 DIAGNOSIS — M54.9 BACK PAIN WITHOUT RADICULOPATHY: Primary | ICD-10-CM

## 2025-05-09 DIAGNOSIS — M54.16 LUMBAR RADICULOPATHY: ICD-10-CM

## 2025-05-09 PROCEDURE — 99212 OFFICE O/P EST SF 10 MIN: CPT | Performed by: ORTHOPAEDIC SURGERY

## 2025-05-09 NOTE — PROGRESS NOTES
Chief complaint: MRI follow-up    HPI: Patient I saw last time with right hip bursitis as well as back pain.  She said the injection we gave her in the right hip bursa took away almost all of her pain and it still feels really good.  She still having back pain.  She has a spondylolisthesis at L4-5 and L5-S1.  She had her MRI.    On exam she still has some tenderness in her back.  She walks normally.  She has pretty good strength.    MRI of the lumbar spine does not show any stenosis anywhere.  She may have slightly more spondylolisthesis when she stands up but in the MRI scanner I do not see any central or foraminal stenosis.  There is a spondylolisthesis at L4-5 and L5-S1.    Assessment/plan: Patient with mechanical back pain and hip bursitis on the right side.  The injection to her right hip gave her significant relief.  Her leg symptoms are not coming from her back.  I do not see any stenosis in her lumbar spine.  This is a nonsurgical problem.  We will give her a pain management prescription.  She will continue with the physical therapy that we just gave her prescription for.  She will follow-up with me on a as needed basis.  She follow-up with one of my hip partners for her right hip bursitis.

## 2025-06-02 ENCOUNTER — APPOINTMENT (OUTPATIENT)
Dept: PHYSICAL THERAPY | Facility: CLINIC | Age: 70
End: 2025-06-02
Payer: MEDICARE

## 2025-06-04 ENCOUNTER — CLINICAL SUPPORT (OUTPATIENT)
Dept: AUDIOLOGY | Facility: CLINIC | Age: 70
End: 2025-06-04
Payer: MEDICARE

## 2025-06-04 ENCOUNTER — OFFICE VISIT (OUTPATIENT)
Dept: OTOLARYNGOLOGY | Facility: CLINIC | Age: 70
End: 2025-06-04
Payer: MEDICARE

## 2025-06-04 VITALS
BODY MASS INDEX: 40.12 KG/M2 | DIASTOLIC BLOOD PRESSURE: 74 MMHG | HEART RATE: 61 BPM | WEIGHT: 218 LBS | TEMPERATURE: 98.2 F | SYSTOLIC BLOOD PRESSURE: 121 MMHG | HEIGHT: 62 IN

## 2025-06-04 DIAGNOSIS — H93.13 TINNITUS OF BOTH EARS: ICD-10-CM

## 2025-06-04 DIAGNOSIS — H90.3 SENSORINEURAL HEARING LOSS (SNHL) OF BOTH EARS: Primary | ICD-10-CM

## 2025-06-04 DIAGNOSIS — H91.90 HOH (HARD OF HEARING): ICD-10-CM

## 2025-06-04 PROCEDURE — 92550 TYMPANOMETRY & REFLEX THRESH: CPT | Mod: 52 | Performed by: AUDIOLOGIST

## 2025-06-04 PROCEDURE — 3008F BODY MASS INDEX DOCD: CPT | Performed by: NURSE PRACTITIONER

## 2025-06-04 PROCEDURE — 3078F DIAST BP <80 MM HG: CPT | Performed by: NURSE PRACTITIONER

## 2025-06-04 PROCEDURE — 99213 OFFICE O/P EST LOW 20 MIN: CPT | Performed by: NURSE PRACTITIONER

## 2025-06-04 PROCEDURE — 99203 OFFICE O/P NEW LOW 30 MIN: CPT | Performed by: NURSE PRACTITIONER

## 2025-06-04 PROCEDURE — 1159F MED LIST DOCD IN RCRD: CPT | Performed by: NURSE PRACTITIONER

## 2025-06-04 PROCEDURE — 3074F SYST BP LT 130 MM HG: CPT | Performed by: NURSE PRACTITIONER

## 2025-06-04 PROCEDURE — 92557 COMPREHENSIVE HEARING TEST: CPT | Performed by: AUDIOLOGIST

## 2025-06-04 PROCEDURE — 1036F TOBACCO NON-USER: CPT | Performed by: NURSE PRACTITIONER

## 2025-06-04 PROCEDURE — 1126F AMNT PAIN NOTED NONE PRSNT: CPT | Performed by: NURSE PRACTITIONER

## 2025-06-04 RX ORDER — DICLOFENAC SODIUM 100 MG/1
TABLET, EXTENDED RELEASE ORAL
COMMUNITY
Start: 2025-06-02

## 2025-06-04 SDOH — ECONOMIC STABILITY: FOOD INSECURITY: WITHIN THE PAST 12 MONTHS, THE FOOD YOU BOUGHT JUST DIDN'T LAST AND YOU DIDN'T HAVE MONEY TO GET MORE.: NEVER TRUE

## 2025-06-04 SDOH — ECONOMIC STABILITY: FOOD INSECURITY: WITHIN THE PAST 12 MONTHS, YOU WORRIED THAT YOUR FOOD WOULD RUN OUT BEFORE YOU GOT MONEY TO BUY MORE.: NEVER TRUE

## 2025-06-04 ASSESSMENT — ENCOUNTER SYMPTOMS
OCCASIONAL FEELINGS OF UNSTEADINESS: 0
DEPRESSION: 0
LOSS OF SENSATION IN FEET: 0

## 2025-06-04 ASSESSMENT — LIFESTYLE VARIABLES
HOW OFTEN DO YOU HAVE SIX OR MORE DRINKS ON ONE OCCASION: NEVER
HOW OFTEN DO YOU HAVE A DRINK CONTAINING ALCOHOL: MONTHLY OR LESS
HOW MANY STANDARD DRINKS CONTAINING ALCOHOL DO YOU HAVE ON A TYPICAL DAY: 1 OR 2
AUDIT-C TOTAL SCORE: 1
SKIP TO QUESTIONS 9-10: 1

## 2025-06-04 ASSESSMENT — PAIN SCALES - GENERAL: PAINLEVEL_OUTOF10: 0-NO PAIN

## 2025-06-04 NOTE — PROGRESS NOTES
"Subjective   Patient ID: Billie HUFF [facility] \"Tim" is a 69 y.o. female who presents for Hearing Loss.    HPI  Patient here for hearing loss gradually over the last couple of years. Struggles with background noise and low voices. Denies ear pain and drainage. She gets bilateral tinnitus, constant, but not very bothersome.   Occasional vertigo.Sensitive to motion.   Denies previous ear surgery and loud noise exposure. She does have a family history of hearing loss.     I reviewed patient's past medical and surgical history.  Problem List[1]  Surgical History[2]    Review of Systems    All other systems have been reviewed and are negative for complaints except for those mentioned in history of present illness, past medical history and problem list.    Objective   Physical Exam    Constitutional: No fever, chills, weight loss or weight gain  General appearance: Appears well, well-nourished, well groomed. No acute distress.    Communication: Normal communication    Psychiatric: Oriented to person, place and time. Normal mood and affect.    Neurologic: Cranial nerves II-XII grossly intact and symmetric bilaterally.    Head and Face:  Head: Atraumatic with no masses, lesions or scarring.  Face: Normal symmetry. No scars or deformities.  TMJ: Normal, no trismus.    Eyes: Conjunctiva not edematous or erythematous.     Right Ear: External inspection of ear with no deformity, scars, or masses. EAC is clear.  TM is intact with no sign of infection, effusion, or retraction.  No perforation seen.     Left Ear: External inspection of ear with no deformity, scars, or masses. EAC is clear.  TM is intact with no sign of infection, effusion, or retraction.  No perforation seen.     Nose: External inspection of nose: No nasal lesions, lacerations or scars. Anterior rhinoscopy with limited visualization past the inferior turbinates. No tenderness on frontal or maxillary sinus palpation.    Oral Cavity/Mouth: Oral cavity and oropharynx " mucosa moist and pink. No lesions or masses. Tonsils appear normal. Uvula is midline. Tongue with no masses or lesions. Tongue with good mobility. The oropharynx is clear.    Neck: Normal appearing, symmetric, trachea midline.     Cardiovascular: Examination of peripheral vascular system shows no clubbing or cyanosis.    Respiratory: No respiratory distress increased work of breathing. Inspection of the chest with symmetric chest expansion and normal respiratory effort.    Skin: No head and neck rashes.    Lymph nodes: No adenopathy.    Diagnostic Results   I personally interpreted audiogram from today:      Assessment/Plan   Diagnoses and all orders for this visit:  Sensorineural hearing loss (SNHL) of both ears  Tinnitus of both ears  Reviewed audiogram in detail. Repeat annually to monitor hearing.   Patient not interested in hearing aids at this time.    The likely etiology of tinnitus was reviewed. The various masking and distraction techniques were discussed including the following:  -Masking the sounds with TV, fan, radio or sound machine.  -Hearing aids will help bring sound in which should decrease tinnitus.  -Lipoflavonoid and other over-the-counter supplements to help with tinnitus.  We did discuss that there is not great evidence or data on these.  -Acupuncture, massage, chiropractic management  - The Lenire. Lenire can help soothe tinnitus. Two 30-minute sessions per day. A dual mode combination of sound and tongue pulses to help your brain to stop focusing on tinnitus. You can learn more about this through [facility] with audiologist Eda Gutierrez. Contact number is [phone].  - Tinnitus retraining therapy: [employer]. The [facility] also has a tinnitus management clinic and can be reached at [phone].    All questions answered to patient satisfaction.        JAYCEE Wilks-CNP 06/04/25 11:26 AM        [1]   Patient Active Problem List  Diagnosis     Arrhythmia    Arthritis of knee, left    Arthritis of knee, right    Back pain    Cervical radiculopathy, chronic    Cervical stricture or stenosis    Chronic pain of toe of left foot    CRP elevated    CTS (carpal tunnel syndrome)    DOS (diffuse esophageal spasm)    Dysphagia    Effusion of left knee    Fatigue    GERD (gastroesophageal reflux disease)    Left upper quadrant abdominal pain    Joint pain    Hypokalemia    Hyperlipemia    Primary hypertension    Headache    Pain in both hands    Osteopenia    Osteoarthritis    TIFFANY (obstructive sleep apnea)    Myofascial pain syndrome    Motion sickness    Metabolic syndrome    Sinusitis    Pain of thoracic facet joint    Palpitations    Paronychia of toe of left foot    Premature menopause    Primary osteoarthritis of both knees    Shoulder pain    Vitamin D deficiency    Tenosynovitis, de Quervain    Snoring    Sleep disorder breathing    Strain of intrinsic muscle of thumb    Hand pain    Depression    Morbid (severe) obesity due to excess calories (Multi)    Left knee pain    Status post total left knee replacement    Tendinitis of left rotator cuff    Acute right flank pain    Poor posture    Stress incontinence of urine    Unilateral primary osteoarthritis, right knee    Preop general physical exam    Abnormal weight gain    Obesity with body mass index 30 or greater    Digestive system disorder    Disorders of glucose transport, unspecified (Multi)    Herniation of rectum into vagina    Numbness and tingling sensation of skin    Overactive bladder    Thumb injury    BMI 40.0-44.9, adult (Multi)    Medicare annual wellness visit, subsequent    Total knee replacement status, right    Senile osteoporosis    CHRISTINA (stress urinary incontinence, female)    Post-op pain    [place] (hard of hearing)   [2]   Past Surgical History:  Procedure Laterality Date    BREAST BIOPSY      CARDIAC CATHETERIZATION      CHOLECYSTECTOMY      EYE SURGERY Right     2023    HYSTERECTOMY       KNEE ARTHROPLASTY      OTHER SURGICAL HISTORY  09/16/2019    Hysterectomy    OTHER SURGICAL HISTORY  09/16/2019    Cardiac catheterization    OTHER SURGICAL HISTORY  09/16/2019    Breast biopsy    OTHER SURGICAL HISTORY  09/16/2019    Cholecystectomy    TUBAL LIGATION      WISDOM TOOTH EXTRACTION

## 2025-06-04 NOTE — PROGRESS NOTES
"AUDIOLOGY ADULT AUDIOMETRIC EVALUATION      Name:  Billie Walsh \"Freya\"  :  1955  Age:  69 y.o.  Date of Evaluation:  2025    HISTORY  Reason for visit:   hearing loss  Ms. Walsh is seen 2025 at the request of  Mario Alberto Fountain MD for an evaluation of hearing.  (Seeing JAYCEE De Oliveira, FNP-C today.)     Chief complaint:    Hearing loss, for a couple years at least; has not noticed any asymmetry     Hearing loss:  Hearing loss, for a couple years at least; has not noticed any asymmetry   Tinnitus:   bilateral; sometimes bothersome; fluctuates  Otitis Media: denies  Otologic surgical history:  denies  Dizziness/imbalance:  occasional dizziness/vertigo with nausea; improves after lying down for a while; sensitive to motion  Otalgia:  denies  Ear pressure/fullness:  denies  History of excessive noise exposure:  denies  Other: none    Hearing aid history: none          EVALUATION  Please find audiogram in \"Media\" tab (Document Type:  Audiology Report) or included at the bottom of this note.    RESULTS   Otoscopic Evaluation: clear canals bilaterally      Immittance Measures (226 Hz probe tone):   Tympanometry is consistent with normal middle ear pressure and normal tympanic membrane mobility bilaterally.       Ipsilateral acoustic reflexes (500-4000 Hz) are present for the right ear for 500-4000 Hz and present for the left ear for 4741-5489 Hz (absent 500 and 4000 Hz).     Test technique:  standard behavioral technique via [facility] earphones.  Reliability is good.    Pure Tone Audiometry:    Right ear:  Hearing sensitivity is in the normal hearing to severe hearing loss range  Left ear: Hearing sensitivity is in the moderately-severe to mild hearing loss range.       Speech Audiometry:        Right Ear:  Speech Reception Threshold (SRT) was obtained at 30 dBHL                 Speech discrimination score was 96% in quiet when words were presented at 80 dBHL      Left Ear:  Speech Reception Threshold " (SRT) was obtained at 25 dBHL                 Speech discrimination score was 100% in quiet when words were presented at 75 dBHL    IMPRESSIONS:  Patient is expected to have communication difficulty in adverse listening environments.    Patient is expected to benefit from devices that provide amplification (e.g., hearing aids) and improve the desired sound signal over that of background noise as well as from effective communication strategies.      RECOMMENDATIONS  Continue with medical follow-up with JAYCEE De Oliveira FNP-C.  Hearing Aid Evaluation with an audiologist to discuss hearing technology (such as hearing aids) and services.  Reassess hearing in 1 year (or sooner if medically indicated or if there is a concern for a change in hearing).    Continue with medical follow-up as indicated.      PATIENT EDUCATION  Discussed results and recommendations with patient.  Questions were addressed and the patient was encouraged to contact our department should concerns arise.       KONRAD Kruse, CCC-A  Licensed Audiologist

## 2025-06-11 ENCOUNTER — APPOINTMENT (OUTPATIENT)
Dept: PHYSICAL THERAPY | Facility: CLINIC | Age: 70
End: 2025-06-11
Payer: MEDICARE

## 2025-06-18 ENCOUNTER — APPOINTMENT (OUTPATIENT)
Dept: PHYSICAL THERAPY | Facility: CLINIC | Age: 70
End: 2025-06-18
Payer: MEDICARE

## 2025-06-25 ENCOUNTER — APPOINTMENT (OUTPATIENT)
Dept: PHYSICAL THERAPY | Facility: CLINIC | Age: 70
End: 2025-06-25
Payer: MEDICARE

## 2025-09-15 ENCOUNTER — APPOINTMENT (OUTPATIENT)
Dept: OBSTETRICS AND GYNECOLOGY | Facility: CLINIC | Age: 70
End: 2025-09-15
Payer: MEDICARE

## 2025-09-16 ENCOUNTER — APPOINTMENT (OUTPATIENT)
Dept: OBSTETRICS AND GYNECOLOGY | Facility: CLINIC | Age: 70
End: 2025-09-16
Payer: MEDICARE

## 2025-09-19 ENCOUNTER — APPOINTMENT (OUTPATIENT)
Dept: PRIMARY CARE | Facility: CLINIC | Age: 70
End: 2025-09-19
Payer: MEDICARE

## 2025-10-13 ENCOUNTER — APPOINTMENT (OUTPATIENT)
Dept: PRIMARY CARE | Facility: CLINIC | Age: 70
End: 2025-10-13
Payer: MEDICARE

## 2026-02-05 ENCOUNTER — APPOINTMENT (OUTPATIENT)
Facility: CLINIC | Age: 71
End: 2026-02-05
Payer: MEDICARE

## (undated) DEVICE — IRRIGATION SET, CYSTOSCOPY, REGULATING CLAMP, STRAIGHT, 81 IN

## (undated) DEVICE — GLOVE, SURGICAL, PROTEXIS PI , 8.0, PF, LF

## (undated) DEVICE — Device

## (undated) DEVICE — STRAP, STIRRUP, W/ SLIP RING

## (undated) DEVICE — SLEEVE, VASO PRESS, CALF GARMENT, MEDIUM, GREEN

## (undated) DEVICE — BLADE, STRYKER, OSC, 19 X 90 X 1.27G

## (undated) DEVICE — SUTURE, VICRYL, 1, 27 IN, CT-1, UNDYED

## (undated) DEVICE — ADHESIVE, SKIN, LIQUIBAND EXCEED

## (undated) DEVICE — SUTURE, STRATAFIX, 1 SYMMETRIC, PDS PLUS, 60CM, CTX, VIOLET

## (undated) DEVICE — DRAPE, TIBURON, LITHOTOMY, W/FLUID CONTROL POUCH

## (undated) DEVICE — SYRINGE, 10 CC, LUER LOCK

## (undated) DEVICE — TISSUE ADHESIVE, PREMIERPRO EXOFIN, PRECISION PEN HV, 1.0ML

## (undated) DEVICE — PAD, KNEE PATIENT, DEMAYO, DISP, STERILE

## (undated) DEVICE — CATHETER TRAY, SURESTEP, 16FR, URINE METER W/STATLOCK

## (undated) DEVICE — SUTURE, MONOCRYL PLUS, 3-0, PS-2 UD 18IN

## (undated) DEVICE — APPLICATOR, CHLORAPREP, W/ORANGE TINT, 26ML

## (undated) DEVICE — SUTURE, VICRYL, 2-0, 18 IN, CT-1, UNDYED

## (undated) DEVICE — NEEDLE, HYPODERMIC, SAFETYGLIDE, SHIELDING, REGULAR WALL, REGULAR BEVEL, 22 G X 1.5 IN, BLACK HUB

## (undated) DEVICE — TUBING, CLEAR N-COND, 5MM X 10, LF

## (undated) DEVICE — STRIP, SKIN CLOSURE, STERI STRIP, REINFORCED, 0.5 X 4 IN

## (undated) DEVICE — INTERPULSE HANDPIECE SET W/ 10FT SUCTION TUBING

## (undated) DEVICE — MIXER, CEMENT, MIXEVAC III HIGH VACUUM KIT, STERILE

## (undated) DEVICE — WOUND SYSTEM, DEBRIDEMENT & CLEANING, O.R DUOPAK

## (undated) DEVICE — HIGH FLOW TIP FOR INTERPULSE HANDPIECE SET

## (undated) DEVICE — BAG, DECANTER

## (undated) DEVICE — DRAPE, UNDERBUTTOCKS